# Patient Record
Sex: MALE | Race: WHITE | Employment: OTHER | URBAN - METROPOLITAN AREA
[De-identification: names, ages, dates, MRNs, and addresses within clinical notes are randomized per-mention and may not be internally consistent; named-entity substitution may affect disease eponyms.]

---

## 2017-01-13 ENCOUNTER — TELEPHONE (OUTPATIENT)
Dept: PRIMARY CARE CLINIC | Age: 67
End: 2017-01-13

## 2017-01-13 RX ORDER — BENZONATATE 200 MG/1
200 CAPSULE ORAL 3 TIMES DAILY PRN
Qty: 30 CAPSULE | Refills: 1 | Status: SHIPPED | OUTPATIENT
Start: 2017-01-13 | End: 2017-09-14

## 2017-01-14 DIAGNOSIS — M25.562 LEFT KNEE PAIN, UNSPECIFIED CHRONICITY: ICD-10-CM

## 2017-01-15 RX ORDER — IBUPROFEN 800 MG/1
TABLET ORAL
Qty: 180 TABLET | Refills: 1 | Status: SHIPPED | OUTPATIENT
Start: 2017-01-15 | End: 2017-11-07 | Stop reason: SDUPTHER

## 2017-01-17 ENCOUNTER — OFFICE VISIT (OUTPATIENT)
Dept: PRIMARY CARE CLINIC | Age: 67
End: 2017-01-17

## 2017-01-17 VITALS
TEMPERATURE: 97.9 F | RESPIRATION RATE: 18 BRPM | BODY MASS INDEX: 42.66 KG/M2 | HEART RATE: 74 BPM | DIASTOLIC BLOOD PRESSURE: 86 MMHG | SYSTOLIC BLOOD PRESSURE: 152 MMHG | OXYGEN SATURATION: 93 % | HEIGHT: 72 IN | WEIGHT: 315 LBS

## 2017-01-17 DIAGNOSIS — I10 ESSENTIAL HYPERTENSION: ICD-10-CM

## 2017-01-17 DIAGNOSIS — J20.9 ACUTE BRONCHITIS, UNSPECIFIED ORGANISM: Primary | ICD-10-CM

## 2017-01-17 PROCEDURE — 4040F PNEUMOC VAC/ADMIN/RCVD: CPT | Performed by: FAMILY MEDICINE

## 2017-01-17 PROCEDURE — G8427 DOCREV CUR MEDS BY ELIG CLIN: HCPCS | Performed by: FAMILY MEDICINE

## 2017-01-17 PROCEDURE — 4004F PT TOBACCO SCREEN RCVD TLK: CPT | Performed by: FAMILY MEDICINE

## 2017-01-17 PROCEDURE — G8484 FLU IMMUNIZE NO ADMIN: HCPCS | Performed by: FAMILY MEDICINE

## 2017-01-17 PROCEDURE — 3017F COLORECTAL CA SCREEN DOC REV: CPT | Performed by: FAMILY MEDICINE

## 2017-01-17 PROCEDURE — 96372 THER/PROPH/DIAG INJ SC/IM: CPT | Performed by: FAMILY MEDICINE

## 2017-01-17 PROCEDURE — 1123F ACP DISCUSS/DSCN MKR DOCD: CPT | Performed by: FAMILY MEDICINE

## 2017-01-17 PROCEDURE — G8419 CALC BMI OUT NRM PARAM NOF/U: HCPCS | Performed by: FAMILY MEDICINE

## 2017-01-17 PROCEDURE — 99213 OFFICE O/P EST LOW 20 MIN: CPT | Performed by: FAMILY MEDICINE

## 2017-01-17 RX ORDER — PROMETHAZINE HYDROCHLORIDE AND CODEINE PHOSPHATE 6.25; 1 MG/5ML; MG/5ML
5 SYRUP ORAL EVERY 4 HOURS PRN
Qty: 240 ML | Refills: 0 | Status: SHIPPED | OUTPATIENT
Start: 2017-01-17 | End: 2017-01-24

## 2017-01-17 RX ORDER — TRAMADOL HYDROCHLORIDE 50 MG/1
TABLET ORAL
Refills: 0 | COMMUNITY
Start: 2017-01-05 | End: 2018-05-21

## 2017-01-17 RX ORDER — AZITHROMYCIN 250 MG/1
TABLET, FILM COATED ORAL
Qty: 1 PACKET | Refills: 0 | Status: SHIPPED | OUTPATIENT
Start: 2017-01-17 | End: 2017-01-27

## 2017-01-17 RX ORDER — CEFTRIAXONE 1 G/1
1 INJECTION, POWDER, FOR SOLUTION INTRAMUSCULAR; INTRAVENOUS ONCE
Status: COMPLETED | OUTPATIENT
Start: 2017-01-17 | End: 2017-01-17

## 2017-01-17 RX ADMIN — CEFTRIAXONE 1 G: 1 INJECTION, POWDER, FOR SOLUTION INTRAMUSCULAR; INTRAVENOUS at 11:09

## 2017-01-17 ASSESSMENT — ENCOUNTER SYMPTOMS
SHORTNESS OF BREATH: 0
COUGH: 1

## 2017-02-04 ASSESSMENT — ENCOUNTER SYMPTOMS
HEMOPTYSIS: 0
APNEA: 0
EYE DISCHARGE: 0
ORTHOPNEA: 0
BLURRED VISION: 0
ABDOMINAL PAIN: 0
ABDOMINAL DISTENTION: 0
HEARTBURN: 0

## 2017-09-14 ENCOUNTER — OFFICE VISIT (OUTPATIENT)
Dept: PRIMARY CARE CLINIC | Age: 67
End: 2017-09-14

## 2017-09-14 VITALS
OXYGEN SATURATION: 96 % | WEIGHT: 315 LBS | SYSTOLIC BLOOD PRESSURE: 138 MMHG | TEMPERATURE: 97.8 F | RESPIRATION RATE: 16 BRPM | HEIGHT: 72 IN | HEART RATE: 58 BPM | BODY MASS INDEX: 42.66 KG/M2 | DIASTOLIC BLOOD PRESSURE: 80 MMHG

## 2017-09-14 DIAGNOSIS — G89.29 CHRONIC PAIN OF RIGHT KNEE: Primary | ICD-10-CM

## 2017-09-14 DIAGNOSIS — I15.9 SECONDARY HYPERTENSION: ICD-10-CM

## 2017-09-14 DIAGNOSIS — M25.561 CHRONIC PAIN OF RIGHT KNEE: Primary | ICD-10-CM

## 2017-09-14 DIAGNOSIS — Z01.818 PRE-OP EXAM: ICD-10-CM

## 2017-09-14 PROCEDURE — 99213 OFFICE O/P EST LOW 20 MIN: CPT | Performed by: INTERNAL MEDICINE

## 2017-09-14 ASSESSMENT — ENCOUNTER SYMPTOMS
VOMITING: 0
NAUSEA: 0
SHORTNESS OF BREATH: 0
VOICE CHANGE: 0
PHOTOPHOBIA: 0
TROUBLE SWALLOWING: 0
CHOKING: 0

## 2017-09-14 ASSESSMENT — PATIENT HEALTH QUESTIONNAIRE - PHQ9
1. LITTLE INTEREST OR PLEASURE IN DOING THINGS: 0
2. FEELING DOWN, DEPRESSED OR HOPELESS: 0
SUM OF ALL RESPONSES TO PHQ QUESTIONS 1-9: 0
SUM OF ALL RESPONSES TO PHQ9 QUESTIONS 1 & 2: 0

## 2017-11-07 DIAGNOSIS — I15.9 SECONDARY HYPERTENSION: ICD-10-CM

## 2017-11-07 DIAGNOSIS — M25.562 LEFT KNEE PAIN, UNSPECIFIED CHRONICITY: ICD-10-CM

## 2017-11-07 DIAGNOSIS — K21.9 GASTROESOPHAGEAL REFLUX DISEASE WITHOUT ESOPHAGITIS: ICD-10-CM

## 2017-11-07 DIAGNOSIS — I10 ESSENTIAL HYPERTENSION: ICD-10-CM

## 2017-11-07 RX ORDER — LISINOPRIL AND HYDROCHLOROTHIAZIDE 20; 12.5 MG/1; MG/1
TABLET ORAL
Qty: 90 TABLET | Refills: 2 | Status: SHIPPED | OUTPATIENT
Start: 2017-11-07 | End: 2018-05-21 | Stop reason: SDUPTHER

## 2017-11-07 RX ORDER — LISINOPRIL 40 MG/1
40 TABLET ORAL DAILY
Qty: 90 TABLET | Refills: 2 | Status: SHIPPED | OUTPATIENT
Start: 2017-11-07 | End: 2018-05-21 | Stop reason: SDUPTHER

## 2017-11-07 RX ORDER — ATENOLOL 50 MG/1
50 TABLET ORAL 2 TIMES DAILY
Qty: 180 TABLET | Refills: 2 | Status: SHIPPED | OUTPATIENT
Start: 2017-11-07 | End: 2018-05-21 | Stop reason: SDUPTHER

## 2017-11-07 RX ORDER — IBUPROFEN 800 MG/1
TABLET ORAL
Qty: 180 TABLET | Refills: 1 | Status: SHIPPED | OUTPATIENT
Start: 2017-11-07 | End: 2018-05-21 | Stop reason: SDUPTHER

## 2017-11-07 RX ORDER — OMEPRAZOLE 20 MG/1
20 CAPSULE, DELAYED RELEASE ORAL DAILY
Qty: 90 CAPSULE | Refills: 1 | Status: SHIPPED | OUTPATIENT
Start: 2017-11-07 | End: 2018-05-21 | Stop reason: SDUPTHER

## 2018-05-21 ENCOUNTER — OFFICE VISIT (OUTPATIENT)
Dept: PRIMARY CARE CLINIC | Age: 68
End: 2018-05-21
Payer: COMMERCIAL

## 2018-05-21 VITALS
DIASTOLIC BLOOD PRESSURE: 88 MMHG | OXYGEN SATURATION: 95 % | SYSTOLIC BLOOD PRESSURE: 156 MMHG | TEMPERATURE: 97.6 F | HEIGHT: 72 IN | HEART RATE: 59 BPM | RESPIRATION RATE: 16 BRPM | BODY MASS INDEX: 42.66 KG/M2 | WEIGHT: 315 LBS

## 2018-05-21 DIAGNOSIS — L30.9 DERMATITIS: ICD-10-CM

## 2018-05-21 DIAGNOSIS — Z00.00 PREVENTATIVE HEALTH CARE: ICD-10-CM

## 2018-05-21 DIAGNOSIS — Z11.4 ENCOUNTER FOR SCREENING FOR HIV: ICD-10-CM

## 2018-05-21 DIAGNOSIS — L30.8 OTHER ECZEMA: ICD-10-CM

## 2018-05-21 DIAGNOSIS — E03.9 HYPOTHYROIDISM, UNSPECIFIED TYPE: ICD-10-CM

## 2018-05-21 DIAGNOSIS — Z12.5 PROSTATE CANCER SCREENING: ICD-10-CM

## 2018-05-21 DIAGNOSIS — I10 ESSENTIAL HYPERTENSION: ICD-10-CM

## 2018-05-21 DIAGNOSIS — M25.562 LEFT KNEE PAIN, UNSPECIFIED CHRONICITY: Primary | ICD-10-CM

## 2018-05-21 DIAGNOSIS — E78.00 PURE HYPERCHOLESTEROLEMIA: ICD-10-CM

## 2018-05-21 DIAGNOSIS — K21.9 GASTROESOPHAGEAL REFLUX DISEASE WITHOUT ESOPHAGITIS: ICD-10-CM

## 2018-05-21 PROCEDURE — 99213 OFFICE O/P EST LOW 20 MIN: CPT | Performed by: INTERNAL MEDICINE

## 2018-05-21 RX ORDER — ATENOLOL 50 MG/1
50 TABLET ORAL 2 TIMES DAILY
Qty: 180 TABLET | Refills: 3 | Status: SHIPPED | OUTPATIENT
Start: 2018-05-21 | End: 2019-06-24 | Stop reason: SDUPTHER

## 2018-05-21 RX ORDER — LISINOPRIL 40 MG/1
40 TABLET ORAL DAILY
Qty: 90 TABLET | Refills: 3 | Status: SHIPPED | OUTPATIENT
Start: 2018-05-21 | End: 2019-12-30 | Stop reason: SDUPTHER

## 2018-05-21 RX ORDER — LISINOPRIL AND HYDROCHLOROTHIAZIDE 20; 12.5 MG/1; MG/1
TABLET ORAL
Qty: 90 TABLET | Refills: 3 | Status: SHIPPED | OUTPATIENT
Start: 2018-05-21 | End: 2019-07-23 | Stop reason: SDUPTHER

## 2018-05-21 RX ORDER — OMEPRAZOLE 20 MG/1
20 CAPSULE, DELAYED RELEASE ORAL DAILY
Qty: 90 CAPSULE | Refills: 3 | Status: SHIPPED | OUTPATIENT
Start: 2018-05-21 | End: 2019-04-15 | Stop reason: SDUPTHER

## 2018-05-21 RX ORDER — IBUPROFEN 800 MG/1
TABLET ORAL
Qty: 180 TABLET | Refills: 2 | Status: SHIPPED | OUTPATIENT
Start: 2018-05-21 | End: 2019-12-03 | Stop reason: SDUPTHER

## 2018-05-24 ASSESSMENT — ENCOUNTER SYMPTOMS
VOMITING: 0
SHORTNESS OF BREATH: 0
CHOKING: 0
PHOTOPHOBIA: 0
HEARTBURN: 1
TROUBLE SWALLOWING: 0
VOICE CHANGE: 0
NAUSEA: 0

## 2018-05-30 DIAGNOSIS — Z12.5 PROSTATE CANCER SCREENING: ICD-10-CM

## 2018-05-30 DIAGNOSIS — I10 ESSENTIAL HYPERTENSION: ICD-10-CM

## 2018-05-30 DIAGNOSIS — E03.9 HYPOTHYROIDISM, UNSPECIFIED TYPE: ICD-10-CM

## 2018-05-30 DIAGNOSIS — Z00.00 PREVENTATIVE HEALTH CARE: ICD-10-CM

## 2018-05-30 LAB
ALBUMIN SERPL-MCNC: 4.4 G/DL (ref 3.9–4.9)
ALP BLD-CCNC: 49 U/L (ref 35–104)
ALT SERPL-CCNC: 13 U/L (ref 0–41)
ANION GAP SERPL CALCULATED.3IONS-SCNC: 16 MEQ/L (ref 7–13)
AST SERPL-CCNC: 15 U/L (ref 0–40)
BASOPHILS ABSOLUTE: 0.1 K/UL (ref 0–0.2)
BASOPHILS RELATIVE PERCENT: 0.8 %
BILIRUB SERPL-MCNC: 0.6 MG/DL (ref 0–1.2)
BUN BLDV-MCNC: 18 MG/DL (ref 8–23)
CALCIUM SERPL-MCNC: 9.1 MG/DL (ref 8.6–10.2)
CHLORIDE BLD-SCNC: 99 MEQ/L (ref 98–107)
CHOLESTEROL, TOTAL: 208 MG/DL (ref 0–199)
CO2: 23 MEQ/L (ref 22–29)
CREAT SERPL-MCNC: 1.04 MG/DL (ref 0.7–1.2)
EOSINOPHILS ABSOLUTE: 0.4 K/UL (ref 0–0.7)
EOSINOPHILS RELATIVE PERCENT: 4.8 %
GFR AFRICAN AMERICAN: >60
GFR NON-AFRICAN AMERICAN: >60
GLOBULIN: 2.4 G/DL (ref 2.3–3.5)
GLUCOSE BLD-MCNC: 102 MG/DL (ref 74–109)
HCT VFR BLD CALC: 47.1 % (ref 42–52)
HDLC SERPL-MCNC: 36 MG/DL (ref 40–59)
HEMOGLOBIN: 15.2 G/DL (ref 14–18)
LDL CHOLESTEROL CALCULATED: 124 MG/DL (ref 0–129)
LYMPHOCYTES ABSOLUTE: 1.4 K/UL (ref 1–4.8)
LYMPHOCYTES RELATIVE PERCENT: 17.6 %
MCH RBC QN AUTO: 28.1 PG (ref 27–31.3)
MCHC RBC AUTO-ENTMCNC: 32.3 % (ref 33–37)
MCV RBC AUTO: 87 FL (ref 80–100)
MONOCYTES ABSOLUTE: 0.8 K/UL (ref 0.2–0.8)
MONOCYTES RELATIVE PERCENT: 9.3 %
NEUTROPHILS ABSOLUTE: 5.5 K/UL (ref 1.4–6.5)
NEUTROPHILS RELATIVE PERCENT: 67.5 %
PDW BLD-RTO: 13.9 % (ref 11.5–14.5)
PLATELET # BLD: 213 K/UL (ref 130–400)
POTASSIUM SERPL-SCNC: 4.1 MEQ/L (ref 3.5–5.1)
PROSTATE SPECIFIC ANTIGEN: 0.68 NG/ML (ref 0–5.4)
RBC # BLD: 5.41 M/UL (ref 4.7–6.1)
SODIUM BLD-SCNC: 138 MEQ/L (ref 132–144)
TOTAL PROTEIN: 6.8 G/DL (ref 6.4–8.1)
TRIGL SERPL-MCNC: 239 MG/DL (ref 0–200)
TSH SERPL DL<=0.05 MIU/L-ACNC: 2.42 UIU/ML (ref 0.27–4.2)
WBC # BLD: 8.1 K/UL (ref 4.8–10.8)

## 2018-06-02 LAB — HIV-1 AND HIV-2 ANTIBODIES: NEGATIVE

## 2018-06-25 ENCOUNTER — TELEPHONE (OUTPATIENT)
Dept: PRIMARY CARE CLINIC | Age: 68
End: 2018-06-25

## 2018-09-20 DIAGNOSIS — I10 ESSENTIAL HYPERTENSION: ICD-10-CM

## 2018-09-20 RX ORDER — ATENOLOL 50 MG/1
50 TABLET ORAL 2 TIMES DAILY
Qty: 180 TABLET | Refills: 3 | OUTPATIENT
Start: 2018-09-20

## 2019-04-15 DIAGNOSIS — I10 ESSENTIAL HYPERTENSION: ICD-10-CM

## 2019-04-15 DIAGNOSIS — K21.9 GASTROESOPHAGEAL REFLUX DISEASE WITHOUT ESOPHAGITIS: ICD-10-CM

## 2019-04-15 RX ORDER — OMEPRAZOLE 20 MG/1
20 CAPSULE, DELAYED RELEASE ORAL DAILY
Qty: 90 CAPSULE | Refills: 3 | Status: SHIPPED | OUTPATIENT
Start: 2019-04-15 | End: 2020-02-06 | Stop reason: SDUPTHER

## 2019-06-24 DIAGNOSIS — I10 ESSENTIAL HYPERTENSION: ICD-10-CM

## 2019-06-24 RX ORDER — ATENOLOL 50 MG/1
TABLET ORAL
Qty: 180 TABLET | Refills: 3 | Status: SHIPPED | OUTPATIENT
Start: 2019-06-24 | End: 2020-02-06 | Stop reason: SDUPTHER

## 2019-07-23 DIAGNOSIS — I10 ESSENTIAL HYPERTENSION: ICD-10-CM

## 2019-07-23 RX ORDER — LISINOPRIL AND HYDROCHLOROTHIAZIDE 20; 12.5 MG/1; MG/1
TABLET ORAL
Qty: 90 TABLET | Refills: 3 | Status: SHIPPED | OUTPATIENT
Start: 2019-07-23 | End: 2020-02-06 | Stop reason: SDUPTHER

## 2019-12-03 ENCOUNTER — TELEPHONE (OUTPATIENT)
Dept: FAMILY MEDICINE CLINIC | Age: 69
End: 2019-12-03

## 2019-12-03 DIAGNOSIS — M25.562 LEFT KNEE PAIN, UNSPECIFIED CHRONICITY: ICD-10-CM

## 2019-12-03 RX ORDER — IBUPROFEN 800 MG/1
TABLET ORAL
Qty: 180 TABLET | Refills: 1 | Status: SHIPPED | OUTPATIENT
Start: 2019-12-03 | End: 2020-02-06 | Stop reason: SDUPTHER

## 2019-12-30 RX ORDER — LISINOPRIL 40 MG/1
40 TABLET ORAL DAILY
Qty: 90 TABLET | Refills: 0 | Status: SHIPPED | OUTPATIENT
Start: 2019-12-30 | End: 2020-02-06 | Stop reason: SDUPTHER

## 2020-02-06 RX ORDER — OMEPRAZOLE 20 MG/1
20 CAPSULE, DELAYED RELEASE ORAL DAILY
Qty: 90 CAPSULE | Refills: 3 | Status: SHIPPED | OUTPATIENT
Start: 2020-02-06 | End: 2020-12-07 | Stop reason: SDUPTHER

## 2020-02-06 RX ORDER — LISINOPRIL 40 MG/1
40 TABLET ORAL DAILY
Qty: 90 TABLET | Refills: 0 | Status: SHIPPED | OUTPATIENT
Start: 2020-02-06 | End: 2020-06-25

## 2020-02-06 RX ORDER — LISINOPRIL AND HYDROCHLOROTHIAZIDE 20; 12.5 MG/1; MG/1
TABLET ORAL
Qty: 90 TABLET | Refills: 3 | Status: SHIPPED | OUTPATIENT
Start: 2020-02-06 | End: 2020-06-25 | Stop reason: SDUPTHER

## 2020-02-06 RX ORDER — IBUPROFEN 800 MG/1
TABLET ORAL
Qty: 180 TABLET | Refills: 1 | Status: SHIPPED | OUTPATIENT
Start: 2020-02-06 | End: 2020-12-07 | Stop reason: SDUPTHER

## 2020-02-06 RX ORDER — ATENOLOL 50 MG/1
TABLET ORAL
Qty: 180 TABLET | Refills: 3 | Status: SHIPPED | OUTPATIENT
Start: 2020-02-06 | End: 2020-12-07 | Stop reason: SDUPTHER

## 2020-03-30 ENCOUNTER — TELEPHONE (OUTPATIENT)
Dept: PRIMARY CARE CLINIC | Age: 70
End: 2020-03-30

## 2020-05-14 ENCOUNTER — OFFICE VISIT (OUTPATIENT)
Dept: PRIMARY CARE CLINIC | Age: 70
End: 2020-05-14
Payer: MEDICARE

## 2020-05-14 VITALS
HEART RATE: 83 BPM | OXYGEN SATURATION: 97 % | WEIGHT: 315 LBS | TEMPERATURE: 97.6 F | HEIGHT: 72 IN | RESPIRATION RATE: 14 BRPM | DIASTOLIC BLOOD PRESSURE: 110 MMHG | BODY MASS INDEX: 42.66 KG/M2 | SYSTOLIC BLOOD PRESSURE: 168 MMHG

## 2020-05-14 PROCEDURE — 99213 OFFICE O/P EST LOW 20 MIN: CPT | Performed by: INTERNAL MEDICINE

## 2020-05-14 RX ORDER — ZOLPIDEM TARTRATE 10 MG/1
10 TABLET ORAL NIGHTLY PRN
Qty: 30 TABLET | Refills: 2 | Status: SHIPPED | OUTPATIENT
Start: 2020-05-14 | End: 2020-08-12

## 2020-05-14 RX ORDER — AMLODIPINE BESYLATE 5 MG/1
5 TABLET ORAL DAILY
Qty: 90 TABLET | Refills: 3 | Status: SHIPPED | OUTPATIENT
Start: 2020-05-14 | End: 2020-12-07 | Stop reason: SDUPTHER

## 2020-05-14 SDOH — ECONOMIC STABILITY: FOOD INSECURITY: WITHIN THE PAST 12 MONTHS, THE FOOD YOU BOUGHT JUST DIDN'T LAST AND YOU DIDN'T HAVE MONEY TO GET MORE.: NEVER TRUE

## 2020-05-14 SDOH — ECONOMIC STABILITY: FOOD INSECURITY: WITHIN THE PAST 12 MONTHS, YOU WORRIED THAT YOUR FOOD WOULD RUN OUT BEFORE YOU GOT MONEY TO BUY MORE.: NEVER TRUE

## 2020-05-14 SDOH — ECONOMIC STABILITY: INCOME INSECURITY: HOW HARD IS IT FOR YOU TO PAY FOR THE VERY BASICS LIKE FOOD, HOUSING, MEDICAL CARE, AND HEATING?: NOT HARD AT ALL

## 2020-05-14 ASSESSMENT — ENCOUNTER SYMPTOMS
SHORTNESS OF BREATH: 0
VOMITING: 0
CHOKING: 0
BACK PAIN: 1
VOICE CHANGE: 0
HEARTBURN: 1
PHOTOPHOBIA: 0
NAUSEA: 0
TROUBLE SWALLOWING: 0

## 2020-05-14 ASSESSMENT — PATIENT HEALTH QUESTIONNAIRE - PHQ9
SUM OF ALL RESPONSES TO PHQ9 QUESTIONS 1 & 2: 0
1. LITTLE INTEREST OR PLEASURE IN DOING THINGS: 0
2. FEELING DOWN, DEPRESSED OR HOPELESS: 0
SUM OF ALL RESPONSES TO PHQ QUESTIONS 1-9: 0
SUM OF ALL RESPONSES TO PHQ QUESTIONS 1-9: 0

## 2020-05-14 NOTE — PROGRESS NOTES
Worry: Never true     Inability: Never true    Transportation needs     Medical: None     Non-medical: None   Tobacco Use    Smoking status: Current Some Day Smoker     Types: Cigarettes    Smokeless tobacco: Never Used    Tobacco comment: cigars, weekly   Substance and Sexual Activity    Alcohol use: Yes     Alcohol/week: 0.0 standard drinks     Comment: rare    Drug use: No    Sexual activity: Not Currently   Lifestyle    Physical activity     Days per week: None     Minutes per session: None    Stress: None   Relationships    Social connections     Talks on phone: None     Gets together: None     Attends Jew service: None     Active member of club or organization: None     Attends meetings of clubs or organizations: None     Relationship status: None    Intimate partner violence     Fear of current or ex partner: None     Emotionally abused: None     Physically abused: None     Forced sexual activity: None   Other Topics Concern    None   Social History Narrative    None     No Known Allergies    Review of Systems   Constitutional: Negative for fatigue and fever. HENT: Negative for trouble swallowing and voice change. Eyes: Negative for photophobia and visual disturbance. Respiratory: Negative for choking and shortness of breath. Cardiovascular: Negative for chest pain and palpitations. Gastrointestinal: Positive for heartburn. Negative for nausea and vomiting. Genitourinary: Negative for decreased urine volume, testicular pain and urgency. Musculoskeletal: Positive for arthralgias and back pain. Skin: Negative for rash. Neurological: Negative for tremors, syncope and headaches. Hematological: Does not bruise/bleed easily. Psychiatric/Behavioral: Positive for agitation and sleep disturbance. Negative for suicidal ideas. The patient has insomnia.             Vitals:    05/14/20 1426   BP: (!) 168/110   Pulse: 83   Resp: 14   Temp: 97.6 °F (36.4 °C)   SpO2: 97% Weight: (!) 338 lb (153.3 kg)   Height: 5' 11.75\" (1.822 m)       Physical Exam  Constitutional:       Appearance: He is well-developed. HENT:      Head: Normocephalic and atraumatic. Mouth/Throat:      Mouth: Mucous membranes are moist.   Eyes:      Conjunctiva/sclera: Conjunctivae normal.      Pupils: Pupils are equal, round, and reactive to light. Neck:      Musculoskeletal: Normal range of motion. Cardiovascular:      Rate and Rhythm: Normal rate and regular rhythm. Pulmonary:      Effort: Pulmonary effort is normal. No respiratory distress. Breath sounds: No wheezing or rales. Abdominal:      General: Bowel sounds are normal. There is no distension. Palpations: Abdomen is soft. Musculoskeletal: Normal range of motion. Skin:     General: Skin is dry. Neurological:      Mental Status: He is alert. Psychiatric:         Mood and Affect: Mood normal.     Assessment/Plan  Anastasia Navas was seen today for insomnia and hypertension. Diagnoses and all orders for this visit:    Insomnia, unspecified type  -     zolpidem (AMBIEN) 10 MG tablet; Take 1 tablet by mouth nightly as needed for Sleep for up to 90 days. Preventative health care  -     Comprehensive Metabolic Panel; Future  -     CBC With Auto Differential; Future  -     PSA Screening; Future  -     Lipid Panel; Future  -     Hemoglobin A1C; Future    Prostate cancer screening  -     PSA Screening; Future    Hyperglycemia  -     Comprehensive Metabolic Panel; Future  -     Hemoglobin A1C; Future    Hyperlipidemia, unspecified hyperlipidemia type  -     Comprehensive Metabolic Panel; Future  -     Lipid Panel; Future    Other orders  -     amLODIPine (NORVASC) 5 MG tablet; Take 1 tablet by mouth daily        Return in about 1 year (around 5/14/2021), or if symptoms worsen or fail to improve.     Maia Porter MD

## 2020-06-19 ENCOUNTER — VIRTUAL VISIT (OUTPATIENT)
Dept: PRIMARY CARE CLINIC | Age: 70
End: 2020-06-19
Payer: MEDICARE

## 2020-06-19 VITALS — BODY MASS INDEX: 41.99 KG/M2 | HEIGHT: 72 IN | WEIGHT: 310 LBS

## 2020-06-19 PROCEDURE — G0438 PPPS, INITIAL VISIT: HCPCS | Performed by: NURSE PRACTITIONER

## 2020-06-19 ASSESSMENT — LIFESTYLE VARIABLES
HOW OFTEN DURING THE LAST YEAR HAVE YOU NEEDED AN ALCOHOLIC DRINK FIRST THING IN THE MORNING TO GET YOURSELF GOING AFTER A NIGHT OF HEAVY DRINKING: 0
AUDIT TOTAL SCORE: 1
HOW OFTEN DURING THE LAST YEAR HAVE YOU HAD A FEELING OF GUILT OR REMORSE AFTER DRINKING: 0
HOW MANY STANDARD DRINKS CONTAINING ALCOHOL DO YOU HAVE ON A TYPICAL DAY: 0
HOW OFTEN DO YOU HAVE A DRINK CONTAINING ALCOHOL: 1
HOW OFTEN DURING THE LAST YEAR HAVE YOU FOUND THAT YOU WERE NOT ABLE TO STOP DRINKING ONCE YOU HAD STARTED: 0
HAVE YOU OR SOMEONE ELSE BEEN INJURED AS A RESULT OF YOUR DRINKING: 0
HOW OFTEN DO YOU HAVE SIX OR MORE DRINKS ON ONE OCCASION: 0
HAS A RELATIVE, FRIEND, DOCTOR, OR ANOTHER HEALTH PROFESSIONAL EXPRESSED CONCERN ABOUT YOUR DRINKING OR SUGGESTED YOU CUT DOWN: 0
HOW OFTEN DURING THE LAST YEAR HAVE YOU BEEN UNABLE TO REMEMBER WHAT HAPPENED THE NIGHT BEFORE BECAUSE YOU HAD BEEN DRINKING: 0
AUDIT-C TOTAL SCORE: 1
HOW OFTEN DURING THE LAST YEAR HAVE YOU FAILED TO DO WHAT WAS NORMALLY EXPECTED FROM YOU BECAUSE OF DRINKING: 0

## 2020-06-19 ASSESSMENT — PATIENT HEALTH QUESTIONNAIRE - PHQ9
SUM OF ALL RESPONSES TO PHQ QUESTIONS 1-9: 0
SUM OF ALL RESPONSES TO PHQ QUESTIONS 1-9: 0

## 2020-06-19 NOTE — PROGRESS NOTES
Medicare Annual Wellness Visit  Are Name: Donal Dean Date: 2020   MRN: [id number] Sex: Male   Age: 78 y.o. Ethnicity: Non-/Non    : 1950 Race: Izaiah Richey is here for Medicare AWV    Screenings for behavioral, psychosocial and functional/safety risks, and cognitive dysfunction are all negative except as indicated below. These results, as well as other patient data from the [address] form, are documented in Flowsheets linked to this Encounter. No Known Allergies      Prior to Visit Medications    Medication Sig Taking? Authorizing Provider   zolpidem (AMBIEN) 10 MG tablet Take 1 tablet by mouth nightly as needed for Sleep for up to 90 days.   Scarlette Leyden, MD   amLODIPine (NORVASC) 5 MG tablet Take 1 tablet by mouth daily  Scarlette Leyden, MD   lisinopril (PRINIVIL;ZESTRIL) 40 MG tablet Take 1 tablet by mouth daily  Scarlette Leyden, MD   atenolol (TENORMIN) 50 MG tablet TAKE 1 TABLET TWICE A DAY  Scarlette Leyden, MD   ibuprofen (ADVIL;MOTRIN) 800 MG tablet TAKE 1 TABLET TWICE A DAY  AS NEEDED FOR PAIN  Scarlette Leyden, MD   omeprazole (PRILOSEC) 20 MG delayed release capsule Take 1 capsule by mouth Daily  Scarlette Leyden, MD   lisinopril-hydrochlorothiazide (PRINZIDE;ZESTORETIC) 20-12.5 MG per tablet 1 pill qd po  Scarlette Leyden, MD         Past Medical History:   Diagnosis Date    GERD (gastroesophageal reflux disease)     Hyperlipidemia     Hypertension        Past Surgical History:   Procedure Laterality Date    CARPAL TUNNEL RELEASE      COLONOSCOPY      VASECTOMY           Family History   Problem Relation Age of Onset    Cancer Mother         uterine, breast, skin    Stroke Brother        CareTeam (Including outside providers/suppliers regularly involved in providing care):   Patient Care Team:  Scarlette Leyden, MD as PCP - General (Internal Medicine)  Scarlette Leyden, MD as PCP - [facility]argas Provider    Wt Readings from Last 3 Encounters:   20 (!)

## 2020-06-19 NOTE — PATIENT INSTRUCTIONS
diet is one that limits sodium , certain types of fat , and cholesterol . This type of diet is recommended for:   People with any form of cardiovascular disease (eg, coronary heart disease , peripheral vascular disease , previous heart attack , previous stroke )   People with risk factors for cardiovascular disease, such as high blood pressure , high cholesterol , or diabetes   Anyone who wants to lower their risk of developing cardiovascular disease   Sodium    Sodium is a mineral found in many foods. In general, most people consume much more sodium than they need. Diets high in sodium can increase blood pressure and lead to edema (water retention). On a heart-healthy diet, you should consume no more than 2,300 mg (milligrams) of sodium per dayabout the amount in one teaspoon of table salt. The foods highest in sodium include table salt (about 50% sodium), processed foods, convenience foods, and preserved foods. Cholesterol    Cholesterol is a fat-like, waxy substance in your blood. Our bodies make some cholesterol. It is also found in animal products, with the highest amounts in fatty meat, egg yolks, whole milk, cheese, shellfish, and organ meats. On a heart-healthy diet, you should limit your cholesterol intake to less than 200 mg per day. It is normal and important to have some cholesterol in your bloodstream. But too much cholesterol can cause plaque to build up within your arteries, which can eventually lead to a heart attack or stroke. The two types of cholesterol that are most commonly referred to are:   Low-density lipoprotein (LDL) cholesterol  Also known as bad cholesterol, this is the cholesterol that tends to build up along your arteries. Bad cholesterol levels are increased by eating fats that are saturated or hydrogenated. Optimal level of this cholesterol is less than 100. Over 130 starts to get risky for heart disease.    High-density lipoprotein (HDL) cholesterol  Also known as good cholesterol, this type of cholesterol actually carries cholesterol away from your arteries and may, therefore, help lower your risk of having a heart attack. You want this level to be high (ideally greater than 60). It is a risk to have a level less than 40. You can raise this good cholesterol by eating olive oil, canola oil, avocados, or nuts. Exercise raises this level, too. Fat    Fat is calorie dense and packs a lot of calories into a small amount of food. Even though fats should be limited due to their high calorie content, not all fats are bad. In fact, some fats are quite healthful. Fat can be broken down into four main types. The good-for-you fats are:   Monounsaturated fat  found in oils such as olive and canola, avocados, and nuts and natural nut butters; can decrease cholesterol levels, while keeping levels of HDL cholesterol high   Polyunsaturated fat  found in oils such as safflower, sunflower, soybean, corn, and sesame; can decrease total cholesterol and LDL cholesterol   Omega-3 fatty acids  particularly those found in fatty fish (such as salmon, trout, tuna, mackerel, herring, and sardines); can decrease risk of arrhythmias, decrease triglyceride levels, and slightly lower blood pressure   The fats that you want to limit are:   Saturated fat  found in animal products, many fast foods, and a few vegetables; increases total blood cholesterol, including LDL levels   Animal fats that are saturated include: butter, lard, whole-milk dairy products, meat fat, and poultry skin   Vegetable fats that are saturated include: hydrogenated shortening, palm oil, coconut oil, cocoa butter   Hydrogenated or trans fat  found in margarine and vegetable shortening, most shelf stable snack foods, and fried foods; increases LDL and decreases HDL     It is generally recommended that you limit your total fat for the day to less than 30% of your total calories.  If you follow an 1800-calorie heart healthy diet, for example, this would mean 60 grams of fat or less per day. Saturated fat and trans fat in your diet raises your blood cholesterol the most, much more than dietary cholesterol does. For this reason, on a heart-healthy diet, less than 7% of your calories should come from saturated fat and ideally 0% from trans fat. On an 1800-calorie diet, this translates into less than 14 grams of saturated fat per day, leaving 46 grams of fat to come from mono- and polyunsaturated fats.    Food Choices on a Heart Healthy Diet   Food Category   Foods Recommended   Foods to Avoid   Grains   Breads and rolls without salted tops Most dry and cooked cereals Unsalted crackers and breadsticks Low-sodium or homemade breadcrumbs or stuffing All rice and pastas   Breads, rolls, and crackers with salted tops High-fat baked goods (eg, muffins, donuts, pastries) Quick breads, self-rising flour, and biscuit mixes Regular bread crumbs Instant hot cereals Commercially prepared rice, pasta, or stuffing mixes   Vegetables   Most fresh, frozen, and low-sodium canned vegetables Low-sodium and salt-free vegetable juices Canned vegetables if unsalted or rinsed   Regular canned vegetables and juices, including sauerkraut and pickled vegetables Frozen vegetables with sauces Commercially prepared potato and vegetable mixes   Fruits   Most fresh, frozen, and canned fruits All fruit juices   Fruits processed with salt or sodium   Milk   Nonfat or low-fat (1%) milk Nonfat or low-fat yogurt Cottage cheese, low-fat ricotta, cheeses labeled as low-fat and low-sodium   Whole milk Reduced-fat (2%) milk Malted and chocolate milk Full fat yogurt Most cheeses (unless low-fat and low salt) Buttermilk (no more than 1 cup per week)   Meats and Beans   Lean cuts of fresh or frozen beef, veal, lamb, or pork (look for the word loin) Fresh or frozen poultry without the skin Fresh or frozen fish and some shellfish Egg whites and egg substitutes (Limit whole eggs to three per week) Tofu Nuts or seeds (unsalted, dry-roasted), low-sodium peanut butter Dried peas, beans, and lentils   Any smoked, cured, salted, or canned meat, fish, or poultry (including mccallum, chipped beef, cold cuts, hot dogs, sausages, sardines, and anchovies) Poultry skins Breaded and/or fried fish or meats Canned peas, beans, and lentils Salted nuts   Fats and Oils   Olive oil and canola oil Low-sodium, low-fat salad dressings and mayonnaise   Butter, margarine, coconut and palm oils, mccallum fat   Snacks, Sweets, and Condiments   Low-sodium or unsalted versions of broths, soups, soy sauce, and condiments Pepper, herbs, and spices; vinegar, lemon, or lime juice Low-fat frozen desserts (yogurt, sherbet, fruit bars) Sugar, cocoa powder, honey, syrup, jam, and preserves Low-fat, trans-fat free cookies, cakes, and pies Thompson and animal crackers, fig bars, walker snaps   High-fat desserts Broth, soups, gravies, and sauces, made from instant mixes or other high-sodium ingredients Salted snack foods Canned olives Meat tenderizers, seasoning salt, and most flavored vinegars   Beverages   Low-sodium carbonated beverages Tea and coffee in moderation Soy milk   Commercially softened water   Suggestions   Make whole grains, fruits, and vegetables the base of your diet. Choose heart-healthy fats such as canola, olive, and flaxseed oil, and foods high in heart-healthy fats, such as nuts, seeds, soybeans, tofu, and fish. Eat fish at least twice per week; the fish highest in omega-3 fatty acids and lowest in mercury include salmon, herring, mackerel, sardines, and canned chunk light tuna. If you eat fish less than twice per week or have high triglycerides, talk to your doctor about taking fish oil supplements. Read food labels.    For products low in fat and cholesterol, look for fat free, low-fat, cholesterol free, saturated fat free, and trans fat freeAlso scan the Nutrition Facts Label, which lists saturated fat, trans fat, and cholesterol amounts. For products low in sodium, look for sodium free, very low sodium, low sodium, no added salt, and unsalted   Skip the salt when cooking or at the table; if food needs more flavor, get creative and try out different herbs and spices. Garlic and onion also add substantial flavor to foods. Trim any visible fat off meat and poultry before cooking, and drain the fat off after sierra. Use cooking methods that require little or no added fat, such as grilling, boiling, baking, poaching, broiling, roasting, steaming, stir-frying, and sauting. Avoid fast food and convenience food. They tend to be high in saturated and trans fat and have a lot of added salt. Talk to a registered dietitian for individualized diet advice. Last Reviewed: March 2011 Maria C Choe MS, MPH, RD   Updated: 3/29/2011   ·     Heart-Healthy Diet   Sodium, Fat, and Cholesterol Controlled Diet       What Is a Heart Healthy Diet? A heart-healthy diet is one that limits sodium , certain types of fat , and cholesterol . This type of diet is recommended for:   People with any form of cardiovascular disease (eg, coronary heart disease , peripheral vascular disease , previous heart attack , previous stroke )   People with risk factors for cardiovascular disease, such as high blood pressure , high cholesterol , or diabetes   Anyone who wants to lower their risk of developing cardiovascular disease   Sodium    Sodium is a mineral found in many foods. In general, most people consume much more sodium than they need. Diets high in sodium can increase blood pressure and lead to edema (water retention). On a heart-healthy diet, you should consume no more than 2,300 mg (milligrams) of sodium per dayabout the amount in one teaspoon of table salt. The foods highest in sodium include table salt (about 50% sodium), processed foods, convenience foods, and preserved foods.    Cholesterol    Cholesterol is a fat-like, waxy Vegetables   Most fresh, frozen, and low-sodium canned vegetables Low-sodium and salt-free vegetable juices Canned vegetables if unsalted or rinsed   Regular canned vegetables and juices, including sauerkraut and pickled vegetables Frozen vegetables with sauces Commercially prepared potato and vegetable mixes   Fruits   Most fresh, frozen, and canned fruits All fruit juices   Fruits processed with salt or sodium   Milk   Nonfat or low-fat (1%) milk Nonfat or low-fat yogurt Cottage cheese, low-fat ricotta, cheeses labeled as low-fat and low-sodium   Whole milk Reduced-fat (2%) milk Malted and chocolate milk Full fat yogurt Most cheeses (unless low-fat and low salt) Buttermilk (no more than 1 cup per week)   Meats and Beans   Lean cuts of fresh or frozen beef, veal, lamb, or pork (look for the word loin) Fresh or frozen poultry without the skin Fresh or frozen fish and some shellfish Egg whites and egg substitutes (Limit whole eggs to three per week) Tofu Nuts or seeds (unsalted, dry-roasted), low-sodium peanut butter Dried peas, beans, and lentils   Any smoked, cured, salted, or canned meat, fish, or poultry (including mccallum, chipped beef, cold cuts, hot dogs, sausages, sardines, and anchovies) Poultry skins Breaded and/or fried fish or meats Canned peas, beans, and lentils Salted nuts   Fats and Oils   Olive oil and canola oil Low-sodium, low-fat salad dressings and mayonnaise   Butter, margarine, coconut and palm oils, mccallum fat   Snacks, Sweets, and Condiments   Low-sodium or unsalted versions of broths, soups, soy sauce, and condiments Pepper, herbs, and spices; vinegar, lemon, or lime juice Low-fat frozen desserts (yogurt, sherbet, fruit bars) Sugar, cocoa powder, honey, syrup, jam, and preserves Low-fat, trans-fat free cookies, cakes, and pies Tohmpson and animal crackers, fig bars, walker snaps   High-fat desserts Broth, soups, gravies, and sauces, made from instant mixes or other high-sodium ingredients Salted snack foods Canned olives Meat tenderizers, seasoning salt, and most flavored vinegars   Beverages   Low-sodium carbonated beverages Tea and coffee in moderation Soy milk   Commercially softened water   Suggestions   Make whole grains, fruits, and vegetables the base of your diet. Choose heart-healthy fats such as canola, olive, and flaxseed oil, and foods high in heart-healthy fats, such as nuts, seeds, soybeans, tofu, and fish. Eat fish at least twice per week; the fish highest in omega-3 fatty acids and lowest in mercury include salmon, herring, mackerel, sardines, and canned chunk light tuna. If you eat fish less than twice per week or have high triglycerides, talk to your doctor about taking fish oil supplements. Read food labels. For products low in fat and cholesterol, look for fat free, low-fat, cholesterol free, saturated fat free, and trans fat freeAlso scan the Nutrition Facts Label, which lists saturated fat, trans fat, and cholesterol amounts. For products low in sodium, look for sodium free, very low sodium, low sodium, no added salt, and unsalted   Skip the salt when cooking or at the table; if food needs more flavor, get creative and try out different herbs and spices. Garlic and onion also add substantial flavor to foods. Trim any visible fat off meat and poultry before cooking, and drain the fat off after sierra. Use cooking methods that require little or no added fat, such as grilling, boiling, baking, poaching, broiling, roasting, steaming, stir-frying, and sauting. Avoid fast food and convenience food. They tend to be high in saturated and trans fat and have a lot of added salt. Talk to a registered dietitian for individualized diet advice. Last Reviewed: March 2011 Maria C Choe MS, MPH, RD   Updated: 3/29/2011   ·     Heart-Healthy Diet   Sodium, Fat, and Cholesterol Controlled Diet       What Is a Heart Healthy Diet?    A heart-healthy diet is one that limits sodium , certain types of fat , and cholesterol . This type of diet is recommended for:   People with any form of cardiovascular disease (eg, coronary heart disease , peripheral vascular disease , previous heart attack , previous stroke )   People with risk factors for cardiovascular disease, such as high blood pressure , high cholesterol , or diabetes   Anyone who wants to lower their risk of developing cardiovascular disease   Sodium    Sodium is a mineral found in many foods. In general, most people consume much more sodium than they need. Diets high in sodium can increase blood pressure and lead to edema (water retention). On a heart-healthy diet, you should consume no more than 2,300 mg (milligrams) of sodium per dayabout the amount in one teaspoon of table salt. The foods highest in sodium include table salt (about 50% sodium), processed foods, convenience foods, and preserved foods. Cholesterol    Cholesterol is a fat-like, waxy substance in your blood. Our bodies make some cholesterol. It is also found in animal products, with the highest amounts in fatty meat, egg yolks, whole milk, cheese, shellfish, and organ meats. On a heart-healthy diet, you should limit your cholesterol intake to less than 200 mg per day. It is normal and important to have some cholesterol in your bloodstream. But too much cholesterol can cause plaque to build up within your arteries, which can eventually lead to a heart attack or stroke. The two types of cholesterol that are most commonly referred to are:   Low-density lipoprotein (LDL) cholesterol  Also known as bad cholesterol, this is the cholesterol that tends to build up along your arteries. Bad cholesterol levels are increased by eating fats that are saturated or hydrogenated. Optimal level of this cholesterol is less than 100. Over 130 starts to get risky for heart disease.    High-density lipoprotein (HDL) cholesterol  Also known as good cholesterol, this type of cholesterol actually carries cholesterol away from your arteries and may, therefore, help lower your risk of having a heart attack. You want this level to be high (ideally greater than 60). It is a risk to have a level less than 40. You can raise this good cholesterol by eating olive oil, canola oil, avocados, or nuts. Exercise raises this level, too. Fat    Fat is calorie dense and packs a lot of calories into a small amount of food. Even though fats should be limited due to their high calorie content, not all fats are bad. In fact, some fats are quite healthful. Fat can be broken down into four main types. The good-for-you fats are:   Monounsaturated fat  found in oils such as olive and canola, avocados, and nuts and natural nut butters; can decrease cholesterol levels, while keeping levels of HDL cholesterol high   Polyunsaturated fat  found in oils such as safflower, sunflower, soybean, corn, and sesame; can decrease total cholesterol and LDL cholesterol   Omega-3 fatty acids  particularly those found in fatty fish (such as salmon, trout, tuna, mackerel, herring, and sardines); can decrease risk of arrhythmias, decrease triglyceride levels, and slightly lower blood pressure   The fats that you want to limit are:   Saturated fat  found in animal products, many fast foods, and a few vegetables; increases total blood cholesterol, including LDL levels   Animal fats that are saturated include: butter, lard, whole-milk dairy products, meat fat, and poultry skin   Vegetable fats that are saturated include: hydrogenated shortening, palm oil, coconut oil, cocoa butter   Hydrogenated or trans fat  found in margarine and vegetable shortening, most shelf stable snack foods, and fried foods; increases LDL and decreases HDL     It is generally recommended that you limit your total fat for the day to less than 30% of your total calories.  If you follow an 1800-calorie heart healthy diet, for example, this would mean 60 than 1 alcohol drink a day if you are a woman. Drink no more than 2 alcohol drinks a day if you are a man. Do not smoke. Smoking can make bones thin faster. If you need help quitting, talk to your doctor about stop-smoking programs and medicines. These can increase your chances of quitting for good. When should you call for help? Watch closely for changes in your health, and be sure to contact your doctor if:  You need help with a healthy eating plan. You need help with an exercise plan    © 2006-2012 [employer]. Care instructions adapted under license by [facility]. This care instruction is for use with your licensed healthcare professional. If you have questions about a medical condition or this instruction, always ask your healthcare professional. [place] [address] any warranty or liability for your use of this information. Content Version: 9.4.70991; Last Revised: June 20, 2011              ·     Alcohol Abuse and Alcoholism   (Alcohol Dependence; Alcohol Use Disorder)       Definition   Alcohol abuse is excessive or problematic alcohol consumption. It can progress to alcoholism. Alcoholism is chronic alcohol abuse that results in a physical dependence on alcohol (withdrawal symptoms) and an inability to stop or limit drinking. Causes   Several factors can contribute to alcohol abuse and alcoholism, including:   Genes   Brain chemicals that may be different than normal   Social pressure   Emotional stress   Pain   Depression and other mental health problems   Problem drinking behaviors learned from family or friends   Risk Factors   These factors increase your chance of developing alcoholism.  Tell your doctor if you have any of these risk factors:   Sex: male   Family members who abuse alcohol (especially men whose fathers or brothers are alcoholic)   Starting to use alcohol at an early age (younger than 15)   Using illicit drugs or non-medical use of and certain medicines. But, there are steps you can take to sharpen your mind and help preserve your memory. Challenge Your Brain   Regularly challenging your mind may help keeps it in top shape. Good mental exercises include:   Crossword puzzlesUse a dictionary if you need it; you will learn more that way. Brainteasers Try some! Crafts, such as wood working and sewing   Hobbies, such as gardening and building model airplanes   SocializingVisit old friends or join groups to meet new ones. Reading   Learning a new language   Taking a class, whether it be art history or jose miguel chi   TravelingExperience the food, history, and culture of your destination   Learning to use a computer   Going to museums, the theater, or thought-provoking movies   Changing things in your daily life, such as reversing your pattern in the grocery store or brushing your teeth using your nondominant hand   Use Memory Aids   There is no need to remember every detail on your own. These memory aids can help:   Calendars and day planners   Electronic organizers to store all sorts of helpful informationThese devices can \"beep\" to remind you of appointments. A book of days to record birthdays, anniversaries, and other occasions that occur on the same date every year   Detailed \"to-do\" lists and strategically placed sticky notes   Quick \"study\" sessionsBefore a gathering, review who will be there so their names will be fresh in your mind. Establish routinesFor example, keep your keys, wallet, and umbrella in the same place all the time or take medicine with your 8:00 AM glass of juice   Live a Healthy Life   Many actions that will keep your body strong will do the same for your mind. For example:   Talk to Your Doctor About Herbs and Supplements    Malnutrition and vitamin deficiencies can impair your mental function. For example, vitamin B12 deficiency can cause a range of symptoms, including confusion.  But, what if your nutritional needs strength, slowed reflexes)   Higher incidence of chronic health problems (eg, arthritis, diabetes) that may limit mobility, agility or sensory awareness   Side effects of medicine (eg, dizziness, blurred vision)especially medicines like prescription pain medicines and drugs used to treat mental health conditions   Depending on the brittleness of your bones, the consequences of a fall can be serious and long lasting. Home Life   Research by the Association of Aging [facility]) shows that some home accidents among older adults can be prevented by making simple lifestyle changes and basic modifications and repairs to the home environment. Here are some lifestyle changes that experts recommend:   Have your hearing and vision checked regularly. Be sure to wear prescription glasses that are right for you. Speak to your doctor or pharmacist about the possible side effects of your medicines. A number of medicines can cause dizziness. If you have problems with sleep, talk to your doctor. Limit your intake of alcohol. If necessary, use a cane or walker to help maintain your balance. Wear supportive, rubber-soled shoes, even at home. If you live in a region that gets wintry weather, you may want to put special cleats on your shoes to prevent you from slipping on the snow and ice. Exercise regularly to help maintain muscle tone, agility, and balance. Always hold the banister when going up or down stairs. Also, use  bars when getting in or out of the bath or shower, or using the toilet. To avoid dizziness, get up slowly from a lying down position. Sit up first, dangling your legs for a minute or two before rising to a standing position. Overall Home Safety Check   According to the Consumer Product Safety Commision's \"Older Consumer Home Safety Checklist,\" it is important to check for potential hazards in each room. And remember, proper lighting is an essential factor in home safety.  If you cannot see clearly, the sink, range, or working areas. Look for coffee pots, kettles and toaster ovens with automatic shut-offs. Keep a mop handy in the kitchen so you can wipe up spills instantly. You should also have a small fire extinguisher. Arrange your kitchen with frequently used items on lower shelves to avoid the need to stand on a stepstool to reach them. Make sure countertops are well-lit to avoid injuries while cutting and preparing food. In the Bathroom    Use a non-slip mat or decals in the tub and shower, since wet, soapy tile or porcelain surfaces are extremely slippery. Make sure bathroom rugs are non-skid or tape them firmly to the floor. Bathtubs should have at least one, preferably two, grab bars, firmly attached to structural supports in the wall. (Do not use built-in soap holders or glass shower doors as grab bars.)    Tub seats fitted with non-slip material on the legs allow you to wash sitting down. For people with limited mobility, bathtub transfer benches allow you to slide safely into the tub. Raised toilet seats and toilet safety rails are helpful for those with knee or hip problems. In the [facility]    Make sure you use a nightlight and that the area around your bed is clear of potential obstacles. Be careful with electric blankets and never go to sleep with a heating pad, which can cause serious burns even if on a low setting. Use fire-resistant mattress covers and pillows, and NEVER smoke in bed. Keep a phone next to the bed that is programmed to dial 911 at the push of a button. If you have a chronic condition, you may want to sign on with an automatic call-in service. Typically the system includes a small pendant that connects directly to an emergency medical voice-response system. You should also make arrangements to stay in contact with someonefriend, neighbor, family memberon a regular schedule.    Fire Prevention   According to the [facility]. [employer] can make it easier for your loved ones. Making plans while you are still able may also ease your mind and make your final days less stressful and more meaningful. Follow-up care is a key part of your treatment and safety. Be sure to make and go to all appointments, and call your doctor if you are having problems. It's also a good idea to know your test results and keep a list of the medicines you take. What can you do to plan for the end of life? You can bring these issues up with your doctor. You do not need to wait until your doctor starts the conversation. You might start with \"I would not be willing to live with . Harvis Pock Harvis Pock Harvis Pock \" When you complete this sentence it helps your doctor understand your wishes. Talk openly and honestly with your doctor. This is the best way to understand the decisions you will need to make as your health changes. Know that you can always change your mind. Ask your doctor about commonly used life-support measures. These include tube feedings, breathing machines, and fluids given through a vein (IV). Understanding these treatments will help you decide whether you want them. You may choose to have these life-supporting treatments for a limited time. This allows a trial period to see whether they will help you. You may also decide that you want your doctor to take only certain measures to keep you alive. It is important to spell out these conditions so that your doctor and family understand them. Talk to your doctor about how long you are likely to live. He or she may be able to give you an idea of what usually happens with your specific illness. Think about preparing papers that state your wishes. This way there will not be any confusion about what you want. You can change your instructions at any time. Which papers should you prepare? Advance directives are legal papers that tell doctors how you want to be cared for at the end of your life. You do not need a  to write these papers. will is a legal document. Each state has its own laws about living gay. And a living will may be called something else in your state. Here are some things to know about living gay. You don't need an  to complete a living will. But legal advice can be helpful if your state's laws are unclear. It can also help if your health history is complicated or your family can't agree on what should be in your living will. You can change your living will at any time. Some people find that their wishes about end-of-life care change as their health changes. If you make big changes to your living will, complete a new form. If you move to another state, make sure that your living will is legal in the state where you now live. In most cases, doctors will respect your wishes even if you have a form from a different state. You might use a universal form that has been approved by many states. This kind of form can sometimes be filled out and stored online. Your digital copy will then be available wherever you have a connection to the internet. The doctors and nurses who need to treat you can find it right away. Your state may offer an online registry. This is another place where you can store your living will online. It's a good idea to get your living will notarized. This means using a person called a  to watch two people sign, or witness, your living will. What should you know when you create a living will? Here are some questions to ask yourself as you make your living will:  Do you know enough about life support methods that might be used? If not, talk to your doctor so you know what might be done if you can't breathe on your own, your heart stops, or you can't swallow. What things would you still want to be able to do after you receive life-support methods? Would you want to be able to walk? To speak? To eat on your own? To live without the help of machines?   Do you want certain Islam practices performed if you become very ill? If you have a choice, where do you want to be cared for? In your home? At a hospital or nursing home? If you have a choice at the end of your life, where would you prefer to die? At home? In a hospital or nursing home? Somewhere else? Would you prefer to be buried or cremated? Do you want your organs to be donated after you die? What should you do with your living will? Make sure that your family members and your health care agent have copies of your living will (also called a declaration). Give your doctor a copy of your living will. Ask him or her to keep it as part of your medical record. If you have more than one doctor, make sure that each one has a copy. Put a copy of your living will where it can be easily found. For example, some people may put a copy on their refrigerator door. If you are using a digital copy, be sure your doctor, family members, and health care agent know how to find and access it. Where can you learn more? Go to https://Third Millennium Materialspepiceweb.[employer]. org and sign in to your S[employer]Call account. Enter I222 in the All My Data box to learn more about \"Learning About Living Sedrick Keren. \"     If you do not have an account, please click on the \"Sign Up Now\" link. Current as of: December 9, 2019               Content Version: 12.5  © 9263-6771 Healthwise, Incorporated. Care instructions adapted under license by [facility] ([facility]). If you have questions about a medical condition or this instruction, always ask your healthcare professional. [place] [address] any warranty or liability for your use of this information. ·        Learning About Medical Power of   What is a medical power of ? A medical power of , also called a durable power of  for health care, is one type of the legal forms called advance directives.  It lets you name the person you want to make treatment decisions for you if you can't speak or decide for yourself. The person you choose is called your health care agent. This person is also called a health care proxy or health care surrogate. A medical power of  may be called something else in your state. How do you choose a health care agent? Choose your health care agent carefully. This person may or may not be a family member. Talk to the person before you make your final decision. Make sure he or she is comfortable with this responsibility. It's a good idea to choose someone who:  Is at least 25years old. Knows you well and understands what makes life meaningful for you. Understands your Sabianism and moral values. Will do what you want, not what he or she wants. Will be able to make difficult choices at a stressful time. Will be able to refuse or stop treatment, if that is what you would want, even if you could die. Will be firm and confident with health professionals if needed. Will ask questions to get needed information. Lives near you or agrees to travel to you if needed. Your family may help you make medical decisions while you can still be part of that process. But it's important to choose one person to be your health care agent in case you aren't able to make decisions for yourself. If you don't fill out the legal form and name a health care agent, the decisions your family can make may be limited. A health care agent may be called something else in your state. Who will make decisions for you if you don't have a health care agent? If you don't have a health care agent or a living will, you may not get the care you want. Decisions may be made by family members who disagree about your medical care. Or decisions may be made by a medical professional who doesn't know you well. In some cases, a  makes the decisions. When you name a health care agent, it is very clear who has the power to make health decisions for you.   How do you name a health care for many problems, such as obesity, diabetes, heart disease, and high blood pressure. Get at least 30 minutes of exercise on most days of the week. Walking is a good choice. You also may want to do other activities, such as running, swimming, cycling, or playing tennis or team sports. Do not smoke. Smoking can make health problems worse. If you need help quitting, talk to your doctor about stop-smoking programs and medicines. These can increase your chances of quitting for good. Protect your skin from too much sun. When you're outdoors from 10 a.m. to 4 p.m., stay in the shade or cover up with clothing and a hat with a wide brim. Wear sunglasses that block UV rays. Even when it's cloudy, put broad-spectrum sunscreen (SPF 30 or higher) on any exposed skin. See a dentist one or two times a year for checkups and to have your teeth cleaned. Wear a seat belt in the car. Follow your doctor's advice about when to have certain tests. These tests can spot problems early. For men and women  Cholesterol. Your doctor will tell you how often to have this done based on your overall health and other things that can increase your risk for heart attack and stroke. Blood pressure. Have your blood pressure checked during a routine doctor visit. Your doctor will tell you how often to check your blood pressure based on your age, your blood pressure results, and other factors. Diabetes. Ask your doctor whether you should have tests for diabetes. Vision. Experts recommend that you have yearly exams for glaucoma and other age-related eye problems. Hearing. Tell your doctor if you notice any change in your hearing. You can have tests to find out how well you hear. Colon cancer tests. Keep having colon cancer tests as your doctor recommends. You can have one of several types of tests. Heart attack and stroke risk. At least every 4 to 6 years, you should have your risk for heart attack and stroke assessed.  Your doctor uses factors such as your age, blood pressure, cholesterol, and whether you smoke or have diabetes to show what your risk for a heart attack or stroke is over the next 10 years. Osteoporosis. Talk to your doctor about whether you should have a bone density test to find out whether you have thinning bones. Ask your doctor if you need to take a calcium plus vitamin D supplement. You may be able to get enough calcium and vitamin D through your diet. For women  Pap test and pelvic exam. You may no longer need a Pap test. Talk with your doctor about whether to stop or continue to have Pap tests. Breast exam and mammogram. Ask how often you should have a mammogram, which is an X-ray of your breasts. A mammogram can spot breast cancer before it can be felt and when it is easiest to treat. Thyroid disease. Talk to your doctor about whether to have your thyroid checked as part of a regular physical exam. Women have an increased chance of a thyroid problem. For men  Prostate exam. Talk to your doctor about whether you should have a blood test (called a PSA test) for prostate cancer. Experts recommend that you discuss the benefits and risks of the test with your doctor before you decide whether to have this test. Some experts say that men ages 79 and older no longer need testing. Abdominal aortic aneurysm. Ask your doctor whether you should have a test to check for an aneurysm. You may need a test if you ever smoked or if your parent, brother, sister, or child has had an aneurysm. When should you call for help? Watch closely for changes in your health, and be sure to contact your doctor if you have any problems or symptoms that concern you. Where can you learn more? Go to https://piyush.[employer]. org and sign in to your [employer] account. Enter A281 in the [facility] box to learn more about \"Well Visit, Over 65: Care Instructions. \"     If you do not have an account, please click on the \"Sign Up Now\" link. Current as of: August 22, 2019               Content Version: 12.5  © 8155-7748 Healthwise, Incorporated. Care instructions adapted under license by [facility] ([facility]). If you have questions about a medical condition or this instruction, always ask your healthcare professional. [address] any warranty or liability for your use of this information.

## 2020-06-20 DIAGNOSIS — R73.9 HYPERGLYCEMIA: ICD-10-CM

## 2020-06-20 DIAGNOSIS — Z12.5 PROSTATE CANCER SCREENING: ICD-10-CM

## 2020-06-20 DIAGNOSIS — E78.5 HYPERLIPIDEMIA, UNSPECIFIED HYPERLIPIDEMIA TYPE: ICD-10-CM

## 2020-06-20 DIAGNOSIS — Z00.00 PREVENTATIVE HEALTH CARE: ICD-10-CM

## 2020-06-20 LAB
ALBUMIN SERPL-MCNC: 3.9 G/DL (ref 3.5–4.6)
ALP BLD-CCNC: 44 U/L (ref 35–104)
ALT SERPL-CCNC: 13 U/L (ref 0–41)
ANION GAP SERPL CALCULATED.3IONS-SCNC: 11 MEQ/L (ref 9–15)
AST SERPL-CCNC: 14 U/L (ref 0–40)
BASOPHILS ABSOLUTE: 0.1 K/UL (ref 0–0.2)
BASOPHILS RELATIVE PERCENT: 0.8 %
BILIRUB SERPL-MCNC: 0.6 MG/DL (ref 0.2–0.7)
BUN BLDV-MCNC: 17 MG/DL (ref 8–23)
CALCIUM SERPL-MCNC: 9 MG/DL (ref 8.5–9.9)
CHLORIDE BLD-SCNC: 102 MEQ/L (ref 95–107)
CHOLESTEROL, TOTAL: 182 MG/DL (ref 0–199)
CO2: 25 MEQ/L (ref 20–31)
CREAT SERPL-MCNC: 0.98 MG/DL (ref 0.7–1.2)
EOSINOPHILS ABSOLUTE: 0.5 K/UL (ref 0–0.7)
EOSINOPHILS RELATIVE PERCENT: 7.2 %
GFR AFRICAN AMERICAN: >60
GFR NON-AFRICAN AMERICAN: >60
GLOBULIN: 2.6 G/DL (ref 2.3–3.5)
GLUCOSE BLD-MCNC: 100 MG/DL (ref 70–99)
HBA1C MFR BLD: 5.6 % (ref 4.8–5.9)
HCT VFR BLD CALC: 47.2 % (ref 42–52)
HDLC SERPL-MCNC: 35 MG/DL (ref 40–59)
HEMOGLOBIN: 15.5 G/DL (ref 14–18)
LDL CHOLESTEROL CALCULATED: 107 MG/DL (ref 0–129)
LYMPHOCYTES ABSOLUTE: 1 K/UL (ref 1–4.8)
LYMPHOCYTES RELATIVE PERCENT: 13.5 %
MCH RBC QN AUTO: 28.5 PG (ref 27–31.3)
MCHC RBC AUTO-ENTMCNC: 32.8 % (ref 33–37)
MCV RBC AUTO: 87.1 FL (ref 80–100)
MONOCYTES ABSOLUTE: 0.7 K/UL (ref 0.2–0.8)
MONOCYTES RELATIVE PERCENT: 9.1 %
NEUTROPHILS ABSOLUTE: 5.1 K/UL (ref 1.4–6.5)
NEUTROPHILS RELATIVE PERCENT: 69.4 %
PDW BLD-RTO: 13.3 % (ref 11.5–14.5)
PLATELET # BLD: 185 K/UL (ref 130–400)
POTASSIUM SERPL-SCNC: 3.6 MEQ/L (ref 3.4–4.9)
PROSTATE SPECIFIC ANTIGEN: 0.59 NG/ML (ref 0–5.4)
RBC # BLD: 5.42 M/UL (ref 4.7–6.1)
SODIUM BLD-SCNC: 138 MEQ/L (ref 135–144)
TOTAL PROTEIN: 6.5 G/DL (ref 6.3–8)
TRIGL SERPL-MCNC: 200 MG/DL (ref 0–150)
WBC # BLD: 7.4 K/UL (ref 4.8–10.8)

## 2020-06-25 RX ORDER — LISINOPRIL AND HYDROCHLOROTHIAZIDE 20; 12.5 MG/1; MG/1
TABLET ORAL
Qty: 90 TABLET | Refills: 3 | Status: SHIPPED | OUTPATIENT
Start: 2020-06-25 | End: 2020-11-20 | Stop reason: SDUPTHER

## 2020-06-25 RX ORDER — LISINOPRIL 40 MG/1
TABLET ORAL
Qty: 90 TABLET | Refills: 0 | Status: SHIPPED | OUTPATIENT
Start: 2020-06-25 | End: 2020-09-17

## 2020-06-25 NOTE — TELEPHONE ENCOUNTER
Pharmacy requesting medication refill. Please approve or deny this request.    Rx requested:  Requested Prescriptions     Pending Prescriptions Disp Refills    lisinopril (PRINIVIL;ZESTRIL) 40 MG tablet [Pharmacy Med Name: LISINOPRIL TAB 40MG] 90 tablet 0     Sig: TAKE 1 TABLET DAILY         Last Office Visit:   5/14/2020      Next Visit Date:  No future appointments.

## 2020-08-30 ENCOUNTER — TELEPHONE (OUTPATIENT)
Dept: FAMILY MEDICINE CLINIC | Age: 70
End: 2020-08-30

## 2020-09-17 RX ORDER — LISINOPRIL 40 MG/1
TABLET ORAL
Qty: 90 TABLET | Refills: 0 | Status: SHIPPED | OUTPATIENT
Start: 2020-09-17 | End: 2020-11-27 | Stop reason: SDUPTHER

## 2020-10-05 ENCOUNTER — TELEPHONE (OUTPATIENT)
Dept: PRIMARY CARE CLINIC | Age: 70
End: 2020-10-05

## 2020-10-05 NOTE — TELEPHONE ENCOUNTER
Ekta Jefferson was contacted to set up a video visit with Jesse Soto MD.     Spoke with: Patient    Patient encouraged to sign up for MyChart in order to complete Virtual Visits, if MyChart status was not already active. Patient not agreeableto sign up for a Virtual Visit with PCP within the next week. Reason for declining VV with PCP in the next week:  Other - Not at this time       Jayna Villeda

## 2020-11-20 ENCOUNTER — TELEPHONE (OUTPATIENT)
Dept: PRIMARY CARE CLINIC | Age: 70
End: 2020-11-20

## 2020-11-20 RX ORDER — LISINOPRIL AND HYDROCHLOROTHIAZIDE 20; 12.5 MG/1; MG/1
TABLET ORAL
Qty: 90 TABLET | Refills: 2 | Status: SHIPPED | OUTPATIENT
Start: 2020-11-20 | End: 2020-11-27 | Stop reason: SDUPTHER

## 2020-11-20 NOTE — TELEPHONE ENCOUNTER
Rx request   Requested Prescriptions     Pending Prescriptions Disp Refills    lisinopril-hydroCHLOROthiazide (PRINZIDE;ZESTORETIC) 20-12.5 MG per tablet 90 tablet 3     Si pill qd po     LOV Visit date not found  Next Visit Date:  No future appointments.

## 2020-11-20 NOTE — TELEPHONE ENCOUNTER
Mazin needs to verify which script pt is supposed to be taking: lisinopril 40mg or lisinopril-hctz 20-12.5    Call back # [phone] ref# [id number]

## 2020-11-27 ENCOUNTER — TELEPHONE (OUTPATIENT)
Dept: PRIMARY CARE CLINIC | Age: 70
End: 2020-11-27

## 2020-11-27 RX ORDER — LISINOPRIL AND HYDROCHLOROTHIAZIDE 20; 12.5 MG/1; MG/1
TABLET ORAL
Qty: 90 TABLET | Refills: 2 | Status: SHIPPED | OUTPATIENT
Start: 2020-11-27 | End: 2020-12-07 | Stop reason: SDUPTHER

## 2020-11-27 RX ORDER — LISINOPRIL 40 MG/1
40 TABLET ORAL DAILY
Qty: 90 TABLET | Refills: 1 | Status: SHIPPED | OUTPATIENT
Start: 2020-11-27 | End: 2020-12-07 | Stop reason: SDUPTHER

## 2020-11-27 RX ORDER — LISINOPRIL AND HYDROCHLOROTHIAZIDE 20; 12.5 MG/1; MG/1
TABLET ORAL
Qty: 90 TABLET | Refills: 2 | Status: CANCELLED | OUTPATIENT
Start: 2020-11-27

## 2020-11-27 NOTE — TELEPHONE ENCOUNTER
Pt called stating he needs lisinopril 20-12.5 called in. Told him pharmacy called about this to verify dose and was told 40mg. Pt states he takes both lisinopril 40mg and 20-12.5. Is this correct?   If so he needs 20-12.5 sent into [facility]

## 2020-12-07 ENCOUNTER — OFFICE VISIT (OUTPATIENT)
Dept: PRIMARY CARE CLINIC | Age: 70
End: 2020-12-07
Payer: MEDICARE

## 2020-12-07 VITALS
HEIGHT: 72 IN | WEIGHT: 315 LBS | TEMPERATURE: 97.4 F | OXYGEN SATURATION: 96 % | HEART RATE: 59 BPM | DIASTOLIC BLOOD PRESSURE: 82 MMHG | RESPIRATION RATE: 14 BRPM | BODY MASS INDEX: 42.66 KG/M2 | SYSTOLIC BLOOD PRESSURE: 138 MMHG

## 2020-12-07 PROCEDURE — G0009 ADMIN PNEUMOCOCCAL VACCINE: HCPCS | Performed by: INTERNAL MEDICINE

## 2020-12-07 PROCEDURE — 90732 PPSV23 VACC 2 YRS+ SUBQ/IM: CPT | Performed by: INTERNAL MEDICINE

## 2020-12-07 PROCEDURE — 99213 OFFICE O/P EST LOW 20 MIN: CPT | Performed by: INTERNAL MEDICINE

## 2020-12-07 RX ORDER — IBUPROFEN 800 MG/1
TABLET ORAL
Qty: 180 TABLET | Refills: 3 | Status: SHIPPED | OUTPATIENT
Start: 2020-12-07 | End: 2021-06-15 | Stop reason: SDUPTHER

## 2020-12-07 RX ORDER — ATENOLOL 50 MG/1
TABLET ORAL
Qty: 180 TABLET | Refills: 3 | Status: SHIPPED | OUTPATIENT
Start: 2020-12-07 | End: 2021-06-15 | Stop reason: SDUPTHER

## 2020-12-07 RX ORDER — OMEPRAZOLE 20 MG/1
20 CAPSULE, DELAYED RELEASE ORAL DAILY
Qty: 90 CAPSULE | Refills: 3 | Status: SHIPPED | OUTPATIENT
Start: 2020-12-07 | End: 2021-06-15 | Stop reason: SDUPTHER

## 2020-12-07 RX ORDER — LISINOPRIL AND HYDROCHLOROTHIAZIDE 20; 12.5 MG/1; MG/1
TABLET ORAL
Qty: 90 TABLET | Refills: 3 | Status: SHIPPED | OUTPATIENT
Start: 2020-12-07 | End: 2021-06-15 | Stop reason: SDUPTHER

## 2020-12-07 RX ORDER — AMLODIPINE BESYLATE 5 MG/1
5 TABLET ORAL DAILY
Qty: 90 TABLET | Refills: 3 | Status: SHIPPED | OUTPATIENT
Start: 2020-12-07 | End: 2021-06-15 | Stop reason: SDUPTHER

## 2020-12-07 RX ORDER — LISINOPRIL 40 MG/1
40 TABLET ORAL DAILY
Qty: 90 TABLET | Refills: 3 | Status: SHIPPED | OUTPATIENT
Start: 2020-12-07 | End: 2021-06-15 | Stop reason: CLARIF

## 2020-12-07 NOTE — PROGRESS NOTES
Becky King 78 y.o. male presents today with   Chief Complaint   Patient presents with    Hypertension    Gastroesophageal Reflux       Hypertension  This is a chronic problem. The current episode started more than 1 year ago. The problem is unchanged. The problem is controlled. Associated symptoms include anxiety. Pertinent negatives include no chest pain, palpitations or shortness of breath. Gastroesophageal Reflux  He complains of heartburn. He reports no chest pain, no choking or no nausea. This is a recurrent problem. The current episode started more than 1 year ago. The problem occurs frequently. The problem has been waxing and waning. Pertinent negatives include no fatigue. Knee Pain   Incident onset: years. The incident occurred at home. There was no injury mechanism. The pain is present in the left knee. The quality of the pain is described as aching. The pain is at a severity of 4/10. The pain is moderate. The pain has been worsening since onset.        Past Medical History:   Diagnosis Date    GERD (gastroesophageal reflux disease)     Hyperlipidemia     Hypertension      Patient Active Problem List    Diagnosis Date Noted    Acute bronchitis 01/17/2017     Past Surgical History:   Procedure Laterality Date    CARPAL TUNNEL RELEASE      COLONOSCOPY      VASECTOMY       Family History   Problem Relation Age of Onset    Cancer Mother         uterine, breast, skin    Stroke Brother      Social History     Socioeconomic History    Marital status:      Spouse name: None    Number of children: None    Years of education: None    Highest education level: None   Occupational History    None   Social Needs    Financial resource strain: Not hard at all   Annita-Elizabeth insecurity     Worry: Never true     Inability: Never true   [address] needs     Medical: None     Non-medical: None   Tobacco Use    Smoking status: Current Some Day Smoker     Types: Cigarettes    Smokeless tobacco: Never Used    Tobacco comment: cigars, weekly   Substance and Sexual Activity    Alcohol use: Yes     Alcohol/week: 0.0 standard drinks     Comment: rare    Drug use: No    Sexual activity: Not Currently   Lifestyle    Physical activity     Days per week: None     Minutes per session: None    Stress: None   Relationships    Social connections     Talks on phone: None     Gets together: None     Attends Anabaptism service: None     Active member of club or organization: None     Attends meetings of clubs or organizations: None     Relationship status: None    Intimate partner violence     Fear of current or ex partner: None     Emotionally abused: None     Physically abused: None     Forced sexual activity: None   Other Topics Concern    None   Social History Narrative    None     No Known Allergies    Review of Systems   Constitutional: Negative for fatigue and fever. HENT: Negative for trouble swallowing and voice change. Eyes: Negative for photophobia and visual disturbance. Respiratory: Negative for choking and shortness of breath. Cardiovascular: Negative for chest pain and palpitations. Gastrointestinal: Positive for heartburn. Negative for nausea and vomiting. Genitourinary: Negative for decreased urine volume, testicular pain and urgency. Musculoskeletal: Positive for arthralgias and back pain. Skin: Negative for rash. Neurological: Negative for tremors and syncope. Hematological: Does not bruise/bleed easily. Psychiatric/Behavioral: Negative for suicidal ideas. Vitals:    12/07/20 1516   BP: 138/82   Pulse: 59   Resp: 14   Temp: 97.4 °F (36.3 °C)   SpO2: 96%   Weight: (!) 349 lb (158.3 kg)   Height: 6' (1.829 m)       Physical Exam  Constitutional:       Appearance: He is well-developed. HENT:      Head: Normocephalic and atraumatic. Eyes:      Conjunctiva/sclera: Conjunctivae normal.      Pupils: Pupils are equal, round, and reactive to light.    Neck: Musculoskeletal: Normal range of motion. Cardiovascular:      Rate and Rhythm: Normal rate and regular rhythm. Pulmonary:      Effort: Pulmonary effort is normal. No respiratory distress. Abdominal:      General: There is no distension. Musculoskeletal: Normal range of motion. Skin:     General: Skin is dry. Coloration: Skin is not jaundiced. Neurological:      Mental Status: He is alert. Psychiatric:         Mood and Affect: Mood normal.        Assessment/Plan  Celsa Brown was seen today for hypertension and gastroesophageal reflux. Diagnoses and all orders for this visit:    Essential hypertension  -     lisinopril (PRINIVIL;ZESTRIL) 40 MG tablet; Take 1 tablet by mouth daily  -     lisinopril-hydroCHLOROthiazide (PRINZIDE;ZESTORETIC) 20-12.5 MG per tablet; 1 pill qd po  -     amLODIPine (NORVASC) 5 MG tablet; Take 1 tablet by mouth daily  -     atenolol (TENORMIN) 50 MG tablet; TAKE 1 TABLET TWICE A DAY    Left knee pain, unspecified chronicity  -     ibuprofen (ADVIL;MOTRIN) 800 MG tablet; TAKE 1 TABLET TWICE A DAY  AS NEEDED FOR PAIN    Gastroesophageal reflux disease without esophagitis  -     omeprazole (PRILOSEC) 20 MG delayed release capsule; Take 1 capsule by mouth Daily    Other orders  -     Pneumococcal polysaccharide vaccine 23-valent greater than or equal to 3yo subcutaneous/IM        No follow-ups on file.     Vidhya Contreras MD

## 2020-12-16 ASSESSMENT — ENCOUNTER SYMPTOMS
VOICE CHANGE: 0
TROUBLE SWALLOWING: 0
HEARTBURN: 1
BACK PAIN: 1
CHOKING: 0
VOMITING: 0
PHOTOPHOBIA: 0
NAUSEA: 0
SHORTNESS OF BREATH: 0

## 2021-06-15 ENCOUNTER — OFFICE VISIT (OUTPATIENT)
Dept: PRIMARY CARE CLINIC | Age: 71
End: 2021-06-15
Payer: MEDICARE

## 2021-06-15 VITALS
HEART RATE: 62 BPM | WEIGHT: 315 LBS | OXYGEN SATURATION: 98 % | SYSTOLIC BLOOD PRESSURE: 162 MMHG | DIASTOLIC BLOOD PRESSURE: 94 MMHG | HEIGHT: 72 IN | BODY MASS INDEX: 42.66 KG/M2 | TEMPERATURE: 97.9 F

## 2021-06-15 DIAGNOSIS — R73.9 HYPERGLYCEMIA: ICD-10-CM

## 2021-06-15 DIAGNOSIS — Z12.5 PROSTATE CANCER SCREENING: ICD-10-CM

## 2021-06-15 DIAGNOSIS — Z00.00 PREVENTATIVE HEALTH CARE: ICD-10-CM

## 2021-06-15 DIAGNOSIS — K21.9 GASTROESOPHAGEAL REFLUX DISEASE WITHOUT ESOPHAGITIS: ICD-10-CM

## 2021-06-15 DIAGNOSIS — I10 ESSENTIAL HYPERTENSION: Primary | ICD-10-CM

## 2021-06-15 DIAGNOSIS — M25.562 LEFT KNEE PAIN, UNSPECIFIED CHRONICITY: ICD-10-CM

## 2021-06-15 DIAGNOSIS — E78.5 HYPERLIPIDEMIA, UNSPECIFIED HYPERLIPIDEMIA TYPE: ICD-10-CM

## 2021-06-15 PROBLEM — M17.12 UNILATERAL PRIMARY OSTEOARTHRITIS, LEFT KNEE: Status: ACTIVE | Noted: 2019-03-11

## 2021-06-15 PROCEDURE — 99213 OFFICE O/P EST LOW 20 MIN: CPT | Performed by: INTERNAL MEDICINE

## 2021-06-15 RX ORDER — LISINOPRIL 40 MG/1
40 TABLET ORAL DAILY
Qty: 90 TABLET | Refills: 3 | Status: SHIPPED | OUTPATIENT
Start: 2021-06-15 | End: 2021-07-19 | Stop reason: ALTCHOICE

## 2021-06-15 RX ORDER — AMLODIPINE BESYLATE 5 MG/1
5 TABLET ORAL DAILY
Qty: 90 TABLET | Refills: 3 | Status: SHIPPED | OUTPATIENT
Start: 2021-06-15 | End: 2022-06-27 | Stop reason: SDUPTHER

## 2021-06-15 RX ORDER — LISINOPRIL 40 MG/1
40 TABLET ORAL DAILY
COMMUNITY
End: 2022-06-27 | Stop reason: SDUPTHER

## 2021-06-15 RX ORDER — IBUPROFEN 800 MG/1
TABLET ORAL
Qty: 180 TABLET | Refills: 3 | Status: SHIPPED | OUTPATIENT
Start: 2021-06-15 | End: 2022-06-27 | Stop reason: SDUPTHER

## 2021-06-15 RX ORDER — OMEPRAZOLE 20 MG/1
20 CAPSULE, DELAYED RELEASE ORAL DAILY
Qty: 90 CAPSULE | Refills: 3 | Status: SHIPPED | OUTPATIENT
Start: 2021-06-15 | End: 2022-06-27 | Stop reason: SDUPTHER

## 2021-06-15 RX ORDER — LISINOPRIL AND HYDROCHLOROTHIAZIDE 20; 12.5 MG/1; MG/1
TABLET ORAL
Qty: 90 TABLET | Refills: 3 | Status: SHIPPED | OUTPATIENT
Start: 2021-06-15 | End: 2022-05-18

## 2021-06-15 RX ORDER — ATENOLOL 50 MG/1
TABLET ORAL
Qty: 180 TABLET | Refills: 3 | Status: SHIPPED | OUTPATIENT
Start: 2021-06-15 | End: 2022-05-18 | Stop reason: SINTOL

## 2021-06-15 SDOH — ECONOMIC STABILITY: TRANSPORTATION INSECURITY
IN THE PAST 12 MONTHS, HAS THE LACK OF TRANSPORTATION KEPT YOU FROM MEDICAL APPOINTMENTS OR FROM GETTING MEDICATIONS?: NO

## 2021-06-15 SDOH — ECONOMIC STABILITY: FOOD INSECURITY: WITHIN THE PAST 12 MONTHS, THE FOOD YOU BOUGHT JUST DIDN'T LAST AND YOU DIDN'T HAVE MONEY TO GET MORE.: NEVER TRUE

## 2021-06-15 SDOH — ECONOMIC STABILITY: TRANSPORTATION INSECURITY
IN THE PAST 12 MONTHS, HAS LACK OF TRANSPORTATION KEPT YOU FROM MEETINGS, WORK, OR FROM GETTING THINGS NEEDED FOR DAILY LIVING?: NO

## 2021-06-15 SDOH — ECONOMIC STABILITY: FOOD INSECURITY: WITHIN THE PAST 12 MONTHS, YOU WORRIED THAT YOUR FOOD WOULD RUN OUT BEFORE YOU GOT MONEY TO BUY MORE.: NEVER TRUE

## 2021-06-15 ASSESSMENT — PATIENT HEALTH QUESTIONNAIRE - PHQ9
2. FEELING DOWN, DEPRESSED OR HOPELESS: 0
SUM OF ALL RESPONSES TO PHQ9 QUESTIONS 1 & 2: 0
SUM OF ALL RESPONSES TO PHQ QUESTIONS 1-9: 0
SUM OF ALL RESPONSES TO PHQ QUESTIONS 1-9: 0
1. LITTLE INTEREST OR PLEASURE IN DOING THINGS: 0
SUM OF ALL RESPONSES TO PHQ QUESTIONS 1-9: 0

## 2021-06-15 ASSESSMENT — SOCIAL DETERMINANTS OF HEALTH (SDOH): HOW HARD IS IT FOR YOU TO PAY FOR THE VERY BASICS LIKE FOOD, HOUSING, MEDICAL CARE, AND HEATING?: NOT HARD AT ALL

## 2021-06-15 NOTE — PROGRESS NOTES
Mara Dies 73 y.o. male presents today with   Chief Complaint   Patient presents with    6 Month Follow-Up    Hypertension    Health Maintenance     Pt declines colonoscopy       Hypertension  This is a chronic problem. The current episode started more than 1 year ago. The problem is unchanged. The problem is controlled. Associated symptoms include anxiety. Pertinent negatives include no chest pain. Gastroesophageal Reflux  He complains of heartburn. He reports no chest pain. This is a recurrent problem. The current episode started more than 1 year ago. The problem has been waxing and waning. The heartburn duration is several minutes. Past Medical History:   Diagnosis Date    GERD (gastroesophageal reflux disease)     Hyperlipidemia     Hypertension      Patient Active Problem List    Diagnosis Date Noted    Pain in left knee 03/11/2019    Unilateral primary osteoarthritis, left knee 03/11/2019    Unilateral primary osteoarthritis, left knee 03/11/2019    Acute bronchitis 01/17/2017     Past Surgical History:   Procedure Laterality Date    CARPAL TUNNEL RELEASE      COLONOSCOPY      VASECTOMY       Family History   Problem Relation Age of Onset    Cancer Mother         uterine, breast, skin    Stroke Brother      Social History     Socioeconomic History    Marital status:      Spouse name: None    Number of children: None    Years of education: None    Highest education level: None   Occupational History    None   Tobacco Use    Smoking status: Never Smoker    Smokeless tobacco: Never Used    Tobacco comment: cigars, weekly   Vaping Use    Vaping Use: Never used   Substance and Sexual Activity    Alcohol use:  Yes     Alcohol/week: 0.0 standard drinks     Comment: rare    Drug use: No    Sexual activity: Not Currently   Other Topics Concern    None   Social History Narrative    None     Social Determinants of Health     Financial Resource Strain: Low Risk     Difficulty of Paying Living Expenses: Not hard at all   Food Insecurity: No Food Insecurity    Worried About Running Out of Food in the Last Year: Never true    Ran Out of Food in the Last Year: Never true   Transportation Needs: No Transportation Needs    Lack of Transportation (Medical): No    Lack of Transportation (Non-Medical): No   Physical Activity:     Days of Exercise per Week:     Minutes of Exercise per Session:    Stress:     Feeling of Stress :    Social Connections:     Frequency of Communication with Friends and Family:     Frequency of Social Gatherings with Friends and Family:     Attends Yazidi Services:     Active Member of Clubs or Organizations:     Attends Club or Organization Meetings:     Marital Status:    Intimate Partner Violence:     Fear of Current or Ex-Partner:     Emotionally Abused:     Physically Abused:     Sexually Abused:      No Known Allergies    Review of Systems   Cardiovascular: Negative for chest pain. Gastrointestinal: Positive for heartburn. Vitals:    06/15/21 1505 06/15/21 1519   BP: (!) 166/92 (!) 162/94   Pulse: 62    Temp: 97.9 °F (36.6 °C)    SpO2: 98%    Weight: (!) 347 lb (157.4 kg)    Height: 6' (1.829 m)        Physical Exam   Assessment/Plan  Mauricio Perez was seen today for 6 month follow-up, hypertension and health maintenance. Diagnoses and all orders for this visit:    Essential hypertension  -     lisinopril-hydroCHLOROthiazide (PRINZIDE;ZESTORETIC) 20-12.5 MG per tablet; 1 pill qd po  -     atenolol (TENORMIN) 50 MG tablet; TAKE 1 TABLET TWICE A DAY  -     amLODIPine (NORVASC) 5 MG tablet; Take 1 tablet by mouth daily  -     lisinopril (PRINIVIL;ZESTRIL) 40 MG tablet; Take 1 tablet by mouth daily  -     Comprehensive Metabolic Panel; Future  -     CBC With Auto Differential; Future    Gastroesophageal reflux disease without esophagitis  -     omeprazole (PRILOSEC) 20 MG delayed release capsule;  Take 1 capsule by mouth

## 2021-06-22 ENCOUNTER — HOSPITAL ENCOUNTER (OUTPATIENT)
Dept: LAB | Age: 71
Discharge: HOME | End: 2021-06-22
Payer: MEDICARE

## 2021-06-22 LAB
ALBUMIN SERPL-MCNC: 4.1 G/DL (ref 3.5–4.6)
ALP BLD-CCNC: 53 U/L (ref 35–104)
ALT SERPL-CCNC: 20 U/L (ref 0–41)
ANION GAP SERPL CALCULATED.3IONS-SCNC: 16 MEQ/L (ref 9–15)
AST SERPL-CCNC: 20 U/L (ref 0–40)
BASOPHILS ABSOLUTE: 0.1 K/UL (ref 0–0.2)
BASOPHILS RELATIVE PERCENT: 0.9 %
BILIRUB SERPL-MCNC: 0.3 MG/DL (ref 0.2–0.7)
BUN BLDV-MCNC: 21 MG/DL (ref 8–23)
CALCIUM SERPL-MCNC: 9.4 MG/DL (ref 8.5–9.9)
CHLORIDE BLD-SCNC: 103 MEQ/L (ref 95–107)
CHOLESTEROL, TOTAL: 192 MG/DL (ref 0–199)
CO2: 24 MEQ/L (ref 20–31)
CREAT SERPL-MCNC: 1.06 MG/DL (ref 0.7–1.2)
EOSINOPHILS ABSOLUTE: 0.4 K/UL (ref 0–0.7)
EOSINOPHILS RELATIVE PERCENT: 5.5 %
GFR AFRICAN AMERICAN: >60
GFR NON-AFRICAN AMERICAN: >60
GLOBULIN: 3 G/DL (ref 2.3–3.5)
GLUCOSE BLD-MCNC: 94 MG/DL (ref 70–99)
HBA1C MFR BLD: 5.6 % (ref 4.8–5.9)
HCT VFR BLD CALC: 46.9 % (ref 42–52)
HDLC SERPL-MCNC: 35 MG/DL (ref 40–59)
HEMOGLOBIN: 15.5 G/DL (ref 14–18)
LDL CHOLESTEROL CALCULATED: 107 MG/DL (ref 0–129)
LYMPHOCYTES ABSOLUTE: 1.2 K/UL (ref 1–4.8)
LYMPHOCYTES RELATIVE PERCENT: 17.1 %
MCH RBC QN AUTO: 28.5 PG (ref 27–31.3)
MCHC RBC AUTO-ENTMCNC: 33 % (ref 33–37)
MCV RBC AUTO: 86.4 FL (ref 80–100)
MONOCYTES ABSOLUTE: 0.6 K/UL (ref 0.2–0.8)
MONOCYTES RELATIVE PERCENT: 8.8 %
NEUTROPHILS ABSOLUTE: 4.8 K/UL (ref 1.4–6.5)
NEUTROPHILS RELATIVE PERCENT: 67.7 %
PDW BLD-RTO: 13.1 % (ref 11.5–14.5)
PLATELET # BLD: 209 K/UL (ref 130–400)
POTASSIUM SERPL-SCNC: 3.4 MEQ/L (ref 3.4–4.9)
PROSTATE SPECIFIC ANTIGEN: 0.62 NG/ML (ref 0–6.22)
RBC # BLD: 5.43 M/UL (ref 4.7–6.1)
SODIUM BLD-SCNC: 143 MEQ/L (ref 135–144)
TOTAL PROTEIN: 7.1 G/DL (ref 6.3–8)
TRIGL SERPL-MCNC: 248 MG/DL (ref 0–150)
WBC # BLD: 7.1 K/UL (ref 4.8–10.8)

## 2021-06-22 PROCEDURE — 80061 LIPID PANEL: CPT

## 2021-06-22 PROCEDURE — G0103 PSA SCREENING: HCPCS

## 2021-06-22 PROCEDURE — 83036 HEMOGLOBIN GLYCOSYLATED A1C: CPT

## 2021-06-22 PROCEDURE — 85025 COMPLETE CBC W/AUTO DIFF WBC: CPT

## 2021-06-22 PROCEDURE — 84153 ASSAY OF PSA TOTAL: CPT

## 2021-06-22 PROCEDURE — 80053 COMPREHEN METABOLIC PANEL: CPT

## 2021-06-28 ASSESSMENT — ENCOUNTER SYMPTOMS: HEARTBURN: 1

## 2021-07-14 ENCOUNTER — TELEPHONE (OUTPATIENT)
Dept: PRIMARY CARE CLINIC | Age: 71
End: 2021-07-14

## 2021-07-19 ENCOUNTER — OFFICE VISIT (OUTPATIENT)
Dept: PRIMARY CARE CLINIC | Age: 71
End: 2021-07-19
Payer: MEDICARE

## 2021-07-19 VITALS
WEIGHT: 315 LBS | HEIGHT: 73 IN | SYSTOLIC BLOOD PRESSURE: 152 MMHG | BODY MASS INDEX: 41.75 KG/M2 | HEART RATE: 74 BPM | DIASTOLIC BLOOD PRESSURE: 92 MMHG | OXYGEN SATURATION: 91 %

## 2021-07-19 DIAGNOSIS — M25.559 HIP PAIN: Primary | ICD-10-CM

## 2021-07-19 PROCEDURE — 99213 OFFICE O/P EST LOW 20 MIN: CPT | Performed by: INTERNAL MEDICINE

## 2021-07-19 RX ORDER — HYDROCODONE BITARTRATE AND ACETAMINOPHEN 5; 325 MG/1; MG/1
1 TABLET ORAL EVERY 6 HOURS PRN
Qty: 28 TABLET | Refills: 0 | Status: SHIPPED | OUTPATIENT
Start: 2021-07-19 | End: 2021-07-26

## 2021-07-19 ASSESSMENT — ENCOUNTER SYMPTOMS
VOMITING: 0
CHOKING: 0
SHORTNESS OF BREATH: 0
BACK PAIN: 0
TROUBLE SWALLOWING: 0
VOICE CHANGE: 0
NAUSEA: 0
PHOTOPHOBIA: 0

## 2021-07-19 NOTE — PROGRESS NOTES
Ishan Quesada 73 y.o. male presents today with   Chief Complaint   Patient presents with    Hip Pain     right hip pain, onset 3 weeks constant pain, was seen at urgent care 4 days ago. scale 1-10 pain he is at an 8        Hip Pain   The incident occurred more than 1 week ago. The incident occurred at home. There was no injury mechanism. The pain is present in the right hip. The pain is at a severity of 4/10. The pain is moderate. The pain has been fluctuating since onset. Past Medical History:   Diagnosis Date    GERD (gastroesophageal reflux disease)     Hyperlipidemia     Hypertension      Patient Active Problem List    Diagnosis Date Noted    Pain in left knee 03/11/2019    Unilateral primary osteoarthritis, left knee 03/11/2019    Unilateral primary osteoarthritis, left knee 03/11/2019    Acute bronchitis 01/17/2017     Past Surgical History:   Procedure Laterality Date    CARPAL TUNNEL RELEASE      COLONOSCOPY      VASECTOMY       Family History   Problem Relation Age of Onset    Cancer Mother         uterine, breast, skin    Stroke Brother      Social History     Socioeconomic History    Marital status:      Spouse name: None    Number of children: None    Years of education: None    Highest education level: None   Occupational History    None   Tobacco Use    Smoking status: Never Smoker    Smokeless tobacco: Never Used    Tobacco comment: cigars, weekly   Vaping Use    Vaping Use: Never used   Substance and Sexual Activity    Alcohol use:  Yes     Alcohol/week: 0.0 standard drinks     Comment: rare    Drug use: No    Sexual activity: Not Currently   Other Topics Concern    None   Social History Narrative    None     Social Determinants of Health     Financial Resource Strain: Low Risk     Difficulty of Paying Living Expenses: Not hard at all   Food Insecurity: No Food Insecurity    Worried About [address] [employer] in the Last Year: Never true    Zaynab of [employer] in the Last Year: Never true   Transportation Needs: No Transportation Needs    Lack of Transportation (Medical): No    Lack of Transportation (Non-Medical): No   Physical Activity:     Days of Exercise per Week:     Minutes of Exercise per Session:    Stress:     Feeling of Stress :    Social Connections:     Frequency of Communication with Friends and Family:     Frequency of Social Gatherings with Friends and Family:     Attends Zoroastrian Services:     Active Member of Clubs or Organizations:     Attends Club or Organization Meetings:     Marital Status:    Intimate Partner Violence:     Fear of Current or Ex-Partner:     Emotionally Abused:     Physically Abused:     Sexually Abused:      No Known Allergies    Review of Systems   Constitutional: Negative for fatigue and fever. HENT: Negative for trouble swallowing and voice change. Eyes: Negative for photophobia and visual disturbance. Respiratory: Negative for choking and shortness of breath. Cardiovascular: Negative for chest pain and palpitations. Gastrointestinal: Negative for nausea and vomiting. Genitourinary: Negative for decreased urine volume and urgency. Musculoskeletal: Positive for arthralgias. Negative for back pain. Skin: Negative for rash. Neurological: Negative for tremors and syncope. Hematological: Does not bruise/bleed easily. Psychiatric/Behavioral: Negative for suicidal ideas. Vitals:    07/19/21 1509 07/19/21 1512   BP: (!) 146/74 (!) 152/92   Pulse: 74    SpO2: 91%    Weight: (!) 351 lb 3.2 oz (159.3 kg)    Height: 6' 1\" (1.854 m)        Physical Exam  Constitutional:       Appearance: He is well-developed. HENT:      Head: Normocephalic and atraumatic. Eyes:      Conjunctiva/sclera: Conjunctivae normal.      Pupils: Pupils are equal, round, and reactive to light. Cardiovascular:      Rate and Rhythm: Normal rate and regular rhythm.    Pulmonary:      Effort: Pulmonary effort is normal. No respiratory distress. Breath sounds: No wheezing. Abdominal:      General: Bowel sounds are normal.      Palpations: Abdomen is soft. Musculoskeletal:         General: Normal range of motion. Cervical back: Normal range of motion. Skin:     General: Skin is dry. Neurological:      Mental Status: He is alert. to consider ct, mri right hip. Assessment/Plan  Randall Joseph was seen today for hip pain. Diagnoses and all orders for this visit:    Hip pain  -     XR HIP RIGHT (2-3 VIEWS); Future  -     HYDROcodone-acetaminophen (NORCO) 5-325 MG per tablet; Take 1 tablet by mouth every 6 hours as needed for Pain for up to 7 days. Intended supply: 7 days. Take lowest dose possible to manage pain  -     External Referral - Wilson Bell MD - Arthroscopic Shoulder Surgery, Sports Med      He will see dr Belén Colvin For left knee. No follow-ups on file.     Linda Thomas MD

## 2021-07-20 ENCOUNTER — TELEPHONE (OUTPATIENT)
Dept: PRIMARY CARE CLINIC | Age: 71
End: 2021-07-20

## 2021-08-23 ENCOUNTER — OFFICE VISIT (OUTPATIENT)
Dept: PRIMARY CARE CLINIC | Age: 71
End: 2021-08-23
Payer: MEDICARE

## 2021-08-23 VITALS
DIASTOLIC BLOOD PRESSURE: 80 MMHG | WEIGHT: 315 LBS | BODY MASS INDEX: 41.75 KG/M2 | HEIGHT: 73 IN | OXYGEN SATURATION: 98 % | TEMPERATURE: 98.2 F | HEART RATE: 66 BPM | SYSTOLIC BLOOD PRESSURE: 154 MMHG

## 2021-08-23 VITALS
OXYGEN SATURATION: 98 % | DIASTOLIC BLOOD PRESSURE: 80 MMHG | SYSTOLIC BLOOD PRESSURE: 152 MMHG | WEIGHT: 315 LBS | HEIGHT: 73 IN | BODY MASS INDEX: 41.75 KG/M2 | HEART RATE: 66 BPM | TEMPERATURE: 98.2 F

## 2021-08-23 DIAGNOSIS — M25.562 CHRONIC PAIN OF LEFT KNEE: Primary | ICD-10-CM

## 2021-08-23 DIAGNOSIS — G89.29 CHRONIC PAIN OF LEFT KNEE: Primary | ICD-10-CM

## 2021-08-23 DIAGNOSIS — Z01.818 PRE-OP EXAM: ICD-10-CM

## 2021-08-23 DIAGNOSIS — Z00.00 ROUTINE GENERAL MEDICAL EXAMINATION AT A HEALTH CARE FACILITY: Primary | ICD-10-CM

## 2021-08-23 PROCEDURE — G0438 PPPS, INITIAL VISIT: HCPCS | Performed by: NURSE PRACTITIONER

## 2021-08-23 PROCEDURE — 99213 OFFICE O/P EST LOW 20 MIN: CPT | Performed by: INTERNAL MEDICINE

## 2021-08-23 ASSESSMENT — LIFESTYLE VARIABLES
HAS A RELATIVE, FRIEND, DOCTOR, OR ANOTHER HEALTH PROFESSIONAL EXPRESSED CONCERN ABOUT YOUR DRINKING OR SUGGESTED YOU CUT DOWN: 0
HAVE YOU OR SOMEONE ELSE BEEN INJURED AS A RESULT OF YOUR DRINKING: 0
HOW OFTEN DURING THE LAST YEAR HAVE YOU FAILED TO DO WHAT WAS NORMALLY EXPECTED FROM YOU BECAUSE OF DRINKING: 0
AUDIT TOTAL SCORE: 2
HOW MANY STANDARD DRINKS CONTAINING ALCOHOL DO YOU HAVE ON A TYPICAL DAY: 0
HOW OFTEN DURING THE LAST YEAR HAVE YOU HAD A FEELING OF GUILT OR REMORSE AFTER DRINKING: 0
HOW OFTEN DO YOU HAVE SIX OR MORE DRINKS ON ONE OCCASION: 0
HOW OFTEN DURING THE LAST YEAR HAVE YOU NEEDED AN ALCOHOLIC DRINK FIRST THING IN THE MORNING TO GET YOURSELF GOING AFTER A NIGHT OF HEAVY DRINKING: 0
AUDIT-C TOTAL SCORE: 2
HOW OFTEN DO YOU HAVE A DRINK CONTAINING ALCOHOL: 2
HOW OFTEN DURING THE LAST YEAR HAVE YOU BEEN UNABLE TO REMEMBER WHAT HAPPENED THE NIGHT BEFORE BECAUSE YOU HAD BEEN DRINKING: 0
HOW OFTEN DURING THE LAST YEAR HAVE YOU FOUND THAT YOU WERE NOT ABLE TO STOP DRINKING ONCE YOU HAD STARTED: 0

## 2021-08-23 ASSESSMENT — PATIENT HEALTH QUESTIONNAIRE - PHQ9
1. LITTLE INTEREST OR PLEASURE IN DOING THINGS: 0
SUM OF ALL RESPONSES TO PHQ QUESTIONS 1-9: 0
SUM OF ALL RESPONSES TO PHQ9 QUESTIONS 1 & 2: 0
SUM OF ALL RESPONSES TO PHQ QUESTIONS 1-9: 0
SUM OF ALL RESPONSES TO PHQ QUESTIONS 1-9: 0
2. FEELING DOWN, DEPRESSED OR HOPELESS: 0

## 2021-08-23 ASSESSMENT — ENCOUNTER SYMPTOMS
VOICE CHANGE: 0
NAUSEA: 0
CHOKING: 0
PHOTOPHOBIA: 0
TROUBLE SWALLOWING: 0
VOMITING: 0
SHORTNESS OF BREATH: 0

## 2021-08-23 NOTE — PROGRESS NOTES
kg)     Vitals:    08/23/21 1417 08/23/21 1424   BP: (!) 160/82 (!) 154/80   Site: Left Upper Arm    Cuff Size: Large Adult    Pulse: 66    Temp: 98.2 °F (36.8 °C)    SpO2: 98%    Weight: (!) 350 lb (158.8 kg)    Height: 6' 1\" (1.854 m)      Body mass index is 46.18 kg/m². Based upon direct observation of the patient, evaluation of cognition reveals recent and remote memory intact. Eyes: pupils equal, round, and reactive to light, extraocular eye movements intact, conjunctivae normal  ENT: tympanic membrane, external ear and ear canal normal bilaterally, nose without deformity, nasal mucosa and turbinates normal without polyps  Neck: supple and non-tender without mass, no thyromegaly or thyroid nodules, no cervical lymphadenopathy  Cardiovascular: normal rate, regular rhythm, normal S1 and S2, no murmurs, rubs, clicks, or gallops, distal pulses intact, no carotid bruits  Abdomen: soft, non-tender, non-distended, normal bowel sounds, no masses or organomegaly  Genitourinary/Rectal: genitals normal without hernia or inguinal adenopathy, rectal normal without masses, prostate normal in size without masses or nodules  Extremities: no cyanosis, clubbing or edema  Musculoskeletal: normal range of motion, no joint swelling, deformity or tenderness    Patient's complete Health Risk Assessment and screening values have been reviewed and are found in Flowsheets. The following problems were reviewed today and where indicated follow up appointments were made and/or referrals ordered. Positive Risk Factor Screenings with Interventions:            General Health and ACP:  General  In general, how would you say your health is?: Good  In the past 7 days, have you experienced any of the following?  New or Increased Pain, New or Increased Fatigue, Loneliness, Social Isolation, Stress or Anger?: (!) New or Increased Pain (hip and knee, rt hip)  Do you get the social and emotional support that you need?: Yes  Do you have a Living Will?: (!) No  Advance Directives     Power of  Living Will ACP-Advance Directive ACP-Power of     Not on File Not on File Not on File Not on File      General Health Risk Interventions:  · Pain issues: home exercises provided, patient declines any further evaluation/treatment for this issue, pt here with knee and hip pain but pt getting cleared for his knee surgery  · No Living Will: Advance Care Planning addressed with patient today and Patient declines ACP discussion/assistance    Health Habits/Nutrition:  Health Habits/Nutrition  Do you exercise for at least 20 minutes 2-3 times per week?: (!) No  Have you lost any weight without trying in the past 3 months?: No  Do you eat only one meal per day?: No  Have you seen the dentist within the past year?: (!) No  Body mass index: (!) 46.17  Health Habits/Nutrition Interventions:  · Inadequate physical activity:  educational materials provided to promote increased physical activity, patient is not ready to increase his/her physical activity level at this time  · Dental exam overdue:  patient encouraged to make appointment with his/her dentist     Safety:  Safety  Do you have working smoke detectors?: Yes  Have all throw rugs been removed or fastened?: Yes  Do you have non-slip mats or surfaces in all bathtubs/showers?: (!) No  Do all of your stairways have a railing or banister?: Yes  Are your doorways, halls and stairs free of clutter?: Yes  Do you always fasten your seatbelt when you are in a car?: Yes  Safety Interventions:  · Home safety tips provided  · Patient declines any further evaluation/treatment for this issue     Personalized Preventive Plan   Current Health Maintenance Status  Immunization History   Administered Date(s) Administered    COVID-19, Moderna, PF, 100mcg/0.5mL 02/12/2021, 03/12/2021    Influenza, High-dose, Quadv, 65 yrs +, IM (Fluzone) 10/16/2020    Pneumococcal Polysaccharide ([id number]) 12/07/2020        Health Maintenance   Topic Date Due    Hepatitis C screen  Never done    DTaP/Tdap/Td vaccine (1 - Tdap) Never done    Shingles Vaccine (1 of 2) Never done    Colon cancer screen colonoscopy  01/01/2013    Annual Wellness Visit (AWV)  06/20/2021    Flu vaccine (1) 09/01/2021    Potassium monitoring  08/17/2022    Creatinine monitoring  08/17/2022    Lipid screen  06/22/2026    Pneumococcal 65+ years Vaccine  Completed    COVID-19 Vaccine  Completed    Hepatitis A vaccine  Aged Out    Hepatitis B vaccine  Aged Out    Hib vaccine  Aged Out    Meningococcal (ACWY) vaccine  Aged Out     Recommendations for [place] Due: see orders and patient instructions/AVS.  . Recommended screening schedule for the next 5-10 years is provided to the patient in written form: see Patient Bj Jamison was seen today for medicare awv.     Diagnoses and all orders for this visit:    Routine general medical examination at a health care facility

## 2021-08-23 NOTE — PROGRESS NOTES
Theo Mccray 73 y.o. male presents today with   Chief Complaint   Patient presents with   Karime Schlichter Exam     Dr Divina Dexter TKR left 8/31       Knee Pain   Incident onset: years. The incident occurred at home. There was no injury mechanism. The pain is present in the right knee. The pain is at a severity of 6/10. The pain is severe. The pain has been worsening since onset. Past Medical History:   Diagnosis Date    GERD (gastroesophageal reflux disease)     Hyperlipidemia     Hypertension      Patient Active Problem List    Diagnosis Date Noted    Pain in left knee 03/11/2019    Unilateral primary osteoarthritis, left knee 03/11/2019    Unilateral primary osteoarthritis, left knee 03/11/2019    Acute bronchitis 01/17/2017     Past Surgical History:   Procedure Laterality Date    CARPAL TUNNEL RELEASE      COLONOSCOPY      VASECTOMY       Family History   Problem Relation Age of Onset    Cancer Mother         uterine, breast, skin    Stroke Brother      Social History     Socioeconomic History    Marital status:      Spouse name: None    Number of children: None    Years of education: None    Highest education level: None   Occupational History    None   Tobacco Use    Smoking status: Never Smoker    Smokeless tobacco: Never Used    Tobacco comment: cigars, weekly   Vaping Use    Vaping Use: Never used   Substance and Sexual Activity    Alcohol use:  Yes     Alcohol/week: 0.0 standard drinks     Comment: rare    Drug use: No    Sexual activity: Not Currently   Other Topics Concern    None   Social History Narrative    None     Social Determinants of Health     Financial Resource Strain: Low Risk     Difficulty of Paying Living Expenses: Not hard at all   Food Insecurity: No Food Insecurity    Worried About Running Out of Food in the Last Year: Never true    Zaynab of [employer] in the Last Year: Never true   Transportation Needs: No Transportation Needs    Lack of Transportation (Medical): No    Lack of Transportation (Non-Medical): No   Physical Activity:     Days of Exercise per Week:     Minutes of Exercise per Session:    Stress:     Feeling of Stress :    Social Connections:     Frequency of Communication with Friends and Family:     Frequency of Social Gatherings with Friends and Family:     Attends Jainism Services:     Active Member of Clubs or Organizations:     Attends Club or Organization Meetings:     Marital Status:    Intimate Partner Violence:     Fear of Current or Ex-Partner:     Emotionally Abused:     Physically Abused:     Sexually Abused:      No Known Allergies    Review of Systems   Constitutional: Negative for fatigue and fever. HENT: Negative for trouble swallowing and voice change. Eyes: Negative for photophobia and visual disturbance. Respiratory: Negative for choking and shortness of breath. Cardiovascular: Negative for chest pain and palpitations. Gastrointestinal: Negative for nausea and vomiting. Genitourinary: Negative for decreased urine volume, testicular pain and urgency. Musculoskeletal: Positive for arthralgias. Skin: Negative for rash. Neurological: Negative for tremors and syncope. Hematological: Does not bruise/bleed easily. Psychiatric/Behavioral: Negative for suicidal ideas. Vitals:    08/23/21 1417 08/23/21 1425   BP: (!) 160/92 (!) 152/80   Site: Left Upper Arm Left Upper Arm   Cuff Size: Large Adult Large Adult   Pulse: 66    Temp: 98.2 °F (36.8 °C)    SpO2: 98%    Weight: (!) 350 lb (158.8 kg)    Height: 6' 1\" (1.854 m)        Physical Exam  Constitutional:       Appearance: He is well-developed. HENT:      Head: Normocephalic and atraumatic. Eyes:      Conjunctiva/sclera: Conjunctivae normal.      Pupils: Pupils are equal, round, and reactive to light. Cardiovascular:      Rate and Rhythm: Normal rate and regular rhythm.    Pulmonary:      Effort: Pulmonary effort is normal. No respiratory distress. Breath sounds: No wheezing or rales. Abdominal:      General: Bowel sounds are normal. There is no distension. Musculoskeletal:         General: Normal range of motion. Cervical back: Normal range of motion. Skin:     General: Skin is dry. Coloration: Skin is not jaundiced. Neurological:      Mental Status: He is alert. Cranial Nerves: No cranial nerve deficit. Psychiatric:         Mood and Affect: Mood normal.       Hold NSAIDs  Assessment/Plan  Jayshree Powers was seen today for pre-op exam.    Diagnoses and all orders for this visit:    Chronic pain of left knee    Pre-op exam    medical cleared for surgery    No follow-ups on file.     Gwen Bess MD

## 2021-08-23 NOTE — PATIENT INSTRUCTIONS
Personalized Preventive Plan for Deepti Willingham - 8/23/2021  Medicare offers a range of preventive health benefits. Some of the tests and screenings are paid in full while other may be subject to a deductible, co-insurance, and/or copay. Some of these benefits include a comprehensive review of your medical history including lifestyle, illnesses that may run in your family, and various assessments and screenings as appropriate. After reviewing your medical record and screening and assessments performed today your provider may have ordered immunizations, labs, imaging, and/or referrals for you. A list of these orders (if applicable) as well as your Preventive Care list are included within your After Visit Summary for your review. Other Preventive Recommendations:    · A preventive eye exam performed by an eye specialist is recommended every 1-2 years to screen for glaucoma; cataracts, macular degeneration, and other eye disorders. · A preventive dental visit is recommended every 6 months. · Try to get at least 150 minutes of exercise per week or 10,000 steps per day on a pedometer . · Order or download the FREE \"Exercise & Physical Activity: Your Everyday Guide\" from [employer]. Call 5-761.109.4241 or search [employer] online. · You need 7849-9555 mg of calcium and 9398-0231 IU of vitamin D per day. It is possible to meet your calcium requirement with diet alone, but a vitamin D supplement is usually necessary to meet this goal.  · When exposed to the sun, use a sunscreen that protects against both UVA and UVB radiation with an SPF of 30 or greater. Reapply every 2 to 3 hours or after sweating, drying off with a towel, or swimming. · Always wear a seat belt when traveling in a car. Always wear a helmet when riding a bicycle or motorcycle. Patient Education        Well Visit, Over 72: Care Instructions  Overview     Well visits can help you stay healthy.  Your doctor has checked your overall health and may have suggested ways to take good care of yourself. Your doctor also may have recommended tests. At home, you can help prevent illness with healthy eating, regular exercise, and other steps. Follow-up care is a key part of your treatment and safety. Be sure to make and go to all appointments, and call your doctor if you are having problems. It's also a good idea to know your test results and keep a list of the medicines you take. How can you care for yourself at home? Get screening tests that you and your doctor decide on. Screening helps find diseases before any symptoms appear. Eat healthy foods. Choose fruits, vegetables, whole grains, protein, and low-fat dairy foods. Limit fat, especially saturated fat. Reduce salt in your diet. Limit alcohol. If you are a man, have no more than 2 drinks a day or 14 drinks a week. If you are a woman, have no more than 1 drink a day or 7 drinks a week. Since alcohol affects older adults differently, you may want to limit alcohol even more. Or you may not want to drink at all. Get at least 30 minutes of exercise on most days of the week. Walking is a good choice. You also may want to do other activities, such as running, swimming, cycling, or playing tennis or team sports. Reach and stay at a healthy weight. This will lower your risk for many problems, such as obesity, diabetes, heart disease, and high blood pressure. Do not smoke. Smoking can make health problems worse. If you need help quitting, talk to your doctor about stop-smoking programs and medicines. These can increase your chances of quitting for good. Care for your mental health. It is easy to get weighed down by worry and stress. Learn strategies to manage stress, like deep breathing and mindfulness, and stay connected with your family and community. If you find you often feel sad or hopeless, talk with your doctor. Treatment can help.   Talk to your doctor about whether you have any risk factors for sexually transmitted infections (STIs). You can help prevent STIs if you wait to have sex with a new partner (or partners) until you've each been tested for STIs. It also helps if you use condoms (male or female condoms) and if you limit your sex partners to one person who only has sex with you. Vaccines are available for some STIs. If you think you may have a problem with alcohol or drug use, talk to your doctor. This includes prescription medicines (such as amphetamines and opioids) and illegal drugs (such as cocaine and methamphetamine). Your doctor can help you figure out what type of treatment is best for you. Protect your skin from too much sun. When you're outdoors from 10 a.m. to 4 p.m., stay in the shade or cover up with clothing and a hat with a wide brim. Wear sunglasses that block UV rays. Even when it's cloudy, put broad-spectrum sunscreen (SPF 30 or higher) on any exposed skin. See a dentist one or two times a year for checkups and to have your teeth cleaned. Wear a seat belt in the car. When should you call for help? Watch closely for changes in your health, and be sure to contact your doctor if you have any problems or symptoms that concern you. Where can you learn more? Go to https://[email or web address] org and sign in to your [employer] account. Enter O135 in the [facility] box to learn more about \"Well Visit, Over 65: Care Instructions. \"     If you do not have an account, please click on the \"Sign Up Now\" link. Current as of: May 27, 2020               Content Version: 12.9  © 0307-6099 [employer]. Care instructions adapted under license by [facility] ([facility]). If you have questions about a medical condition or this instruction, always ask your healthcare professional. [address] any warranty or liability for your use of this information.

## 2021-10-05 ENCOUNTER — TELEPHONE (OUTPATIENT)
Dept: PRIMARY CARE CLINIC | Age: 71
End: 2021-10-05

## 2021-10-08 ENCOUNTER — IMMUNIZATION (OUTPATIENT)
Dept: PRIMARY CARE CLINIC | Age: 71
End: 2021-10-08
Payer: MEDICARE

## 2021-10-08 DIAGNOSIS — Z23 NEED FOR INFLUENZA VACCINATION: Primary | ICD-10-CM

## 2021-10-08 PROCEDURE — G0008 ADMIN INFLUENZA VIRUS VAC: HCPCS | Performed by: INTERNAL MEDICINE

## 2021-10-08 PROCEDURE — 90694 VACC AIIV4 NO PRSRV 0.5ML IM: CPT | Performed by: INTERNAL MEDICINE

## 2022-04-04 ENCOUNTER — OFFICE VISIT (OUTPATIENT)
Dept: PRIMARY CARE CLINIC | Age: 72
End: 2022-04-04
Payer: MEDICARE

## 2022-04-04 VITALS
SYSTOLIC BLOOD PRESSURE: 122 MMHG | OXYGEN SATURATION: 99 % | DIASTOLIC BLOOD PRESSURE: 74 MMHG | BODY MASS INDEX: 42.66 KG/M2 | TEMPERATURE: 97.7 F | HEIGHT: 72 IN | WEIGHT: 315 LBS | HEART RATE: 54 BPM

## 2022-04-04 DIAGNOSIS — R00.1 BRADYCARDIA: ICD-10-CM

## 2022-04-04 DIAGNOSIS — I10 ESSENTIAL HYPERTENSION: ICD-10-CM

## 2022-04-04 DIAGNOSIS — E03.9 HYPOTHYROIDISM, UNSPECIFIED TYPE: ICD-10-CM

## 2022-04-04 DIAGNOSIS — R06.02 SOB (SHORTNESS OF BREATH): ICD-10-CM

## 2022-04-04 DIAGNOSIS — R73.9 HYPERGLYCEMIA: ICD-10-CM

## 2022-04-04 DIAGNOSIS — R00.1 BRADYCARDIA: Primary | ICD-10-CM

## 2022-04-04 LAB
ALBUMIN SERPL-MCNC: 4.1 G/DL (ref 3.5–4.6)
ALP BLD-CCNC: 52 U/L (ref 35–104)
ALT SERPL-CCNC: 22 U/L (ref 0–41)
ANION GAP SERPL CALCULATED.3IONS-SCNC: 14 MEQ/L (ref 9–15)
AST SERPL-CCNC: 16 U/L (ref 0–40)
BASOPHILS ABSOLUTE: 0.3 K/UL (ref 0–0.2)
BASOPHILS RELATIVE PERCENT: 3 %
BILIRUB SERPL-MCNC: 0.5 MG/DL (ref 0.2–0.7)
BUN BLDV-MCNC: 18 MG/DL (ref 8–23)
CALCIUM SERPL-MCNC: 9.2 MG/DL (ref 8.5–9.9)
CHLORIDE BLD-SCNC: 101 MEQ/L (ref 95–107)
CO2: 25 MEQ/L (ref 20–31)
CREAT SERPL-MCNC: 1.21 MG/DL (ref 0.7–1.2)
EOSINOPHILS ABSOLUTE: 0.5 K/UL (ref 0–0.7)
EOSINOPHILS RELATIVE PERCENT: 5 %
GFR AFRICAN AMERICAN: >60
GFR NON-AFRICAN AMERICAN: 59.2
GLOBULIN: 3.1 G/DL (ref 2.3–3.5)
GLUCOSE BLD-MCNC: 96 MG/DL (ref 70–99)
HBA1C MFR BLD: 5.7 % (ref 4.8–5.9)
HCT VFR BLD CALC: 45.4 % (ref 42–52)
HEMOGLOBIN: 14.8 G/DL (ref 14–18)
LYMPHOCYTES ABSOLUTE: 1.7 K/UL (ref 1–4.8)
LYMPHOCYTES RELATIVE PERCENT: 16 %
MCH RBC QN AUTO: 27.8 PG (ref 27–31.3)
MCHC RBC AUTO-ENTMCNC: 32.7 % (ref 33–37)
MCV RBC AUTO: 85.2 FL (ref 80–100)
MONOCYTES ABSOLUTE: 0.5 K/UL (ref 0.2–0.8)
MONOCYTES RELATIVE PERCENT: 4.7 %
NEUTROPHILS ABSOLUTE: 7.5 K/UL (ref 1.4–6.5)
NEUTROPHILS RELATIVE PERCENT: 72 %
PDW BLD-RTO: 14.6 % (ref 11.5–14.5)
PLATELET # BLD: 216 K/UL (ref 130–400)
PLATELET SLIDE REVIEW: ADEQUATE
POTASSIUM SERPL-SCNC: 4 MEQ/L (ref 3.4–4.9)
RBC # BLD: 5.33 M/UL (ref 4.7–6.1)
RBC # BLD: NORMAL 10*6/UL
SMUDGE CELLS: 5.7
SODIUM BLD-SCNC: 140 MEQ/L (ref 135–144)
TOTAL PROTEIN: 7.2 G/DL (ref 6.3–8)
TSH REFLEX: 2.5 UIU/ML (ref 0.44–3.86)
WBC # BLD: 10.4 K/UL (ref 4.8–10.8)

## 2022-04-04 PROCEDURE — 99214 OFFICE O/P EST MOD 30 MIN: CPT | Performed by: INTERNAL MEDICINE

## 2022-04-04 PROCEDURE — 93000 ELECTROCARDIOGRAM COMPLETE: CPT | Performed by: INTERNAL MEDICINE

## 2022-04-04 RX ORDER — CYCLOBENZAPRINE HCL 10 MG
TABLET ORAL
COMMUNITY
Start: 2021-09-13 | End: 2022-05-18 | Stop reason: SDUPTHER

## 2022-04-04 ASSESSMENT — ENCOUNTER SYMPTOMS
NAUSEA: 0
VOICE CHANGE: 0
BACK PAIN: 1
CHOKING: 0
ABDOMINAL PAIN: 0
SHORTNESS OF BREATH: 1
TROUBLE SWALLOWING: 0
PHOTOPHOBIA: 0
VOMITING: 0

## 2022-04-04 NOTE — PROGRESS NOTES
Blank Gurrola 81 y.o. male presents today with   Chief Complaint   Patient presents with    6 Month Follow-Up    Shortness of Breath     onset x 2 week, no chest pain. Standing and walking, the SOB doesn't last long. He will catch his breath within 30 seconds.  Dizziness     onset 2 weeks ago, when getting up walking around. Doesn't feel like he is going to pass out when he stands up. Drinking fluids,     Other     Bp was 120/70, good but has High BP in the past        Shortness of Breath  This is a recurrent (with light headed) problem. Episode onset: 2 weeks ago. The problem has been waxing and waning. Pertinent negatives include no abdominal pain, chest pain, fever, rash or vomiting. The symptoms are aggravated by any activity. Dizziness  Associated symptoms include arthralgias. Pertinent negatives include no abdominal pain, chest pain, fatigue, fever, nausea, rash or vomiting. Other  Associated symptoms include arthralgias. Pertinent negatives include no abdominal pain, chest pain, fatigue, fever, nausea, rash or vomiting.        Past Medical History:   Diagnosis Date    GERD (gastroesophageal reflux disease)     Hyperlipidemia     Hypertension      Patient Active Problem List    Diagnosis Date Noted    Pain in left knee 03/11/2019    Unilateral primary osteoarthritis, left knee 03/11/2019    Unilateral primary osteoarthritis, left knee 03/11/2019    Acute bronchitis 01/17/2017     Past Surgical History:   Procedure Laterality Date    CARPAL TUNNEL RELEASE      COLONOSCOPY      VASECTOMY       Family History   Problem Relation Age of Onset    Cancer Mother         uterine, breast, skin    Stroke Brother      Social History     Socioeconomic History    Marital status:      Spouse name: Not on file    Number of children: Not on file    Years of education: Not on file    Highest education level: Not on file   Occupational History    Not on file   Tobacco Use    Smoking status: Never Smoker    Smokeless tobacco: Never Used    Tobacco comment: cigars, weekly   Vaping Use    Vaping Use: Never used   Substance and Sexual Activity    Alcohol use: Yes     Alcohol/week: 0.0 standard drinks     Comment: rare    Drug use: No    Sexual activity: Not Currently   Other Topics Concern    Not on file   Social History Narrative    Not on file     Social Determinants of Health     Financial Resource Strain: Low Risk     Difficulty of Paying Living Expenses: Not hard at all   Food Insecurity: No Food Insecurity    Worried About Running Out of Food in the Last Year: Never true    Zaynab of Food in the Last Year: Never true   Transportation Needs: No Transportation Needs    Lack of Transportation (Medical): No    Lack of Transportation (Non-Medical): No   Physical Activity:     Days of Exercise per Week: Not on file    Minutes of Exercise per Session: Not on file   Stress:     Feeling of Stress : Not on file   Social Connections:     Frequency of Communication with Friends and Family: Not on file    Frequency of Social Gatherings with Friends and Family: Not on file    Attends Druze Services: Not on file    Active Member of [address] [place] [address] or Organizations: Not on file    Attends Club or Organization Meetings: Not on file    Marital Status: Not on file   Intimate Partner Violence:     Fear of Current or Ex-Partner: Not on file    Emotionally Abused: Not on file    Physically Abused: Not on file    Sexually Abused: Not on file   Housing Stability:     Unable to Pay for Housing in the Last Year: Not on file    Number of [place] in the Last Year: Not on file    Unstable Housing in the Last Year: Not on file     No Known Allergies    Review of Systems   Constitutional: Negative for fatigue and fever. HENT: Negative for trouble swallowing and voice change. Eyes: Negative for photophobia and visual disturbance. Respiratory: Positive for shortness of breath.  Negative for choking. Cardiovascular: Negative for chest pain and palpitations. Gastrointestinal: Negative for abdominal pain, nausea and vomiting. Genitourinary: Negative for decreased urine volume and urgency. Musculoskeletal: Positive for arthralgias and back pain. Skin: Negative for rash. Neurological: Positive for dizziness and light-headedness. Negative for tremors and syncope. Hematological: Does not bruise/bleed easily. Psychiatric/Behavioral: Positive for agitation. Negative for suicidal ideas. Vitals:    04/04/22 1315   BP: 122/74   Site: Right Upper Arm   Position: Sitting   Cuff Size: Large Adult   Pulse: 54   Temp: 97.7 °F (36.5 °C)   SpO2: 99%   Weight: (!) 352 lb (159.7 kg)   Height: 6' (1.829 m)       Physical Exam  Constitutional:       Appearance: He is well-developed. HENT:      Head: Normocephalic and atraumatic. Eyes:      Conjunctiva/sclera: Conjunctivae normal.   Cardiovascular:      Rate and Rhythm: Normal rate and regular rhythm. Pulmonary:      Effort: Pulmonary effort is normal. No respiratory distress. Abdominal:      General: Bowel sounds are normal. There is no distension. Musculoskeletal:         General: Normal range of motion. Cervical back: Normal range of motion. Skin:     General: Skin is dry. Coloration: Skin is not jaundiced. Neurological:      Mental Status: He is alert. Cranial Nerves: No cranial nerve deficit. Psychiatric:         Mood and Affect: Mood normal.       Assessment/Plan  Jessica Celis was seen today for 6 month follow-up, shortness of breath, dizziness and other. Diagnoses and all orders for this visit:    Bradycardia  -     Comprehensive Metabolic Panel; Future  -     CBC with Auto Differential; Future  -     TSH with Reflex; Future   to decrease from 100mg a day to 50 mg  Essential hypertension  -     Comprehensive Metabolic Panel;  Future  -     CBC with Auto Differential; Future    Hyperglycemia  -     Comprehensive Metabolic Panel; Future  -     Hemoglobin A1C; Future    Hypothyroidism, unspecified type  -     TSH with Reflex; Future    Body mass index (BMI) 45.0-49.9, adult (HCC)    2 lb gain      Return in about 3 months (around 7/4/2022), or if symptoms worsen or fail to improve.     Jesse Soto MD

## 2022-04-18 ENCOUNTER — TELEPHONE (OUTPATIENT)
Dept: PRIMARY CARE CLINIC | Age: 72
End: 2022-04-18

## 2022-04-18 NOTE — TELEPHONE ENCOUNTER
----- Message from Lashay Espionsa sent at 4/18/2022 11:44 AM EDT -----  Subject: Message to Provider    QUESTIONS  Information for Provider? Pt blood pressure was low 122/80 when he saw Dr. Manuel Young III on 4/4/2022), Dr told pt to cut RX atenolol (TENORMIN) 50 MG   tablet) in half (taking 1 pill instead of 2 pill), Had a EKG on same day. As of Saturday blood pressure is 162/112 thru [address]  ---------------------------------------------------------------------------  --------------  [address]  What is the best way for the office to contact you? OK to leave message on   voicemail  Preferred Call Back Phone Number? [phone]  ---------------------------------------------------------------------------  --------------  SCRIPT ANSWERS  Relationship to Patient?  Self

## 2022-05-05 LAB
LEFT VENTRICULAR EJECTION FRACTION MODE: NORMAL
LV EF: 65 %

## 2022-05-18 ENCOUNTER — OFFICE VISIT (OUTPATIENT)
Dept: PRIMARY CARE CLINIC | Age: 72
End: 2022-05-18
Payer: MEDICARE

## 2022-05-18 VITALS
RESPIRATION RATE: 18 BRPM | DIASTOLIC BLOOD PRESSURE: 72 MMHG | HEIGHT: 72 IN | SYSTOLIC BLOOD PRESSURE: 136 MMHG | WEIGHT: 315 LBS | HEART RATE: 71 BPM | BODY MASS INDEX: 42.66 KG/M2 | TEMPERATURE: 96.5 F | OXYGEN SATURATION: 95 %

## 2022-05-18 DIAGNOSIS — M25.559 HIP PAIN: ICD-10-CM

## 2022-05-18 DIAGNOSIS — R00.1 BRADYCARDIA: Primary | ICD-10-CM

## 2022-05-18 DIAGNOSIS — Z09 HOSPITAL DISCHARGE FOLLOW-UP: ICD-10-CM

## 2022-05-18 PROCEDURE — 1111F DSCHRG MED/CURRENT MED MERGE: CPT | Performed by: INTERNAL MEDICINE

## 2022-05-18 PROCEDURE — 99215 OFFICE O/P EST HI 40 MIN: CPT | Performed by: INTERNAL MEDICINE

## 2022-05-18 RX ORDER — CYCLOBENZAPRINE HCL 10 MG
10 TABLET ORAL 2 TIMES DAILY PRN
Qty: 20 TABLET | Refills: 0 | Status: SHIPPED | OUTPATIENT
Start: 2022-05-18

## 2022-05-18 RX ORDER — HYDROCHLOROTHIAZIDE 25 MG/1
TABLET ORAL
COMMUNITY
Start: 2022-05-05 | End: 2022-06-27 | Stop reason: SDUPTHER

## 2022-05-18 NOTE — PROGRESS NOTES
Post-Discharge Transitional Care Management Progress Note  Sung Fall   YOB: 1950    Date of Office Visit:  5/18/2022  Date of Hospital Admission:   Date of Hospital Discharge:   Care management risk score Rising risk (score 2-5) and Complex Care (Scores >=6): 1   Non face to face  following discharge, date last encounter closed (first attempt may have been earlier): *No documented post hospital discharge outreach found in the last 14 days *No documented post hospital discharge outreach found in the last 14 days  Call initiated 2 business days of discharge: *No response recorded in the last 14 days    ASSESSMENT/PLAN:   Bradycardia  Comments:  resolved. Will follow with cardio. Atenolol [facility] discharge follow-up  -     SC DISCHARGE MEDS RECONCILED W/ CURRENT OUTPATIENT MED LIST  Hip pain  Comments:  wants flexeril refill   Orders:  -     cyclobenzaprine (FLEXERIL) 10 MG tablet; Take 1 tablet by mouth 2 times daily as needed for Muscle spasms, Disp-20 tablet, R-0Normal      Medical Decision Making: moderate complexity  No follow-ups on file. On this date 5/18/2022 I have spent 20 minutes reviewing previous notes, test results and face to face with the patient discussing the diagnosis and importance of compliance with the treatment plan as well as documenting on the day of the visit. Subjective:   HPI:  Follow up of Hospital problems/diagnosis(es):     Pt presents for follow up 20 [facility] admission regarding symptomatic bradycardia. He had been on atenolol. But with gradual lightheadedness and HR dropping to 30s and 40s, work up showed NSR with 1st degree AV block with occasional PVC at 67beats per minute. 2D echo;he left ventricle is normal in size. There is moderate left ventricular hypertrophy. Left ventricular systolic function is normal. EF = 65 ± 5%. Aortic, pulmonic, tricuspid valve not well visualized. No significant valve functional abnormalities.     Since atenolol Dc HR normalized, symptoms resolved. Vitals 5/18/2022 4/4/2022 8/23/2021 8/23/2021 8/23/2021 8/23/2021   Pulse 71 54   66 66     Planned for sleep study by cardio. Inpatient course: Discharge summary reviewed- see chart. Interval history/Current status: stable   Patient Active Problem List   Diagnosis    Acute bronchitis    Pain in left knee    Unilateral primary osteoarthritis, left knee    Unilateral primary osteoarthritis, left knee       Medications listed as ordered at the time of discharge from hospital     Medication List          Accurate as of May 18, 2022  4:11 PM. If you have any questions, ask your nurse or doctor.             CHANGE how you take these medications    cyclobenzaprine 10 MG tablet  Commonly known as: FLEXERIL  Take 1 tablet by mouth 2 times daily as needed for Muscle spasms  What changed:   · how much to take  · when to take this  · reasons to take this  Changed by: Deon Schrader MD        CONTINUE taking these medications    amLODIPine 5 MG tablet  Commonly known as: NORVASC  Take 1 tablet by mouth daily     hydroCHLOROthiazide 25 MG tablet  Commonly known as: HYDRODIURIL     ibuprofen 800 MG tablet  Commonly known as: ADVIL;MOTRIN  TAKE 1 TABLET TWICE A DAY  AS NEEDED FOR PAIN     lisinopril 40 MG tablet  Commonly known as: PRINIVIL;ZESTRIL     omeprazole 20 MG delayed release capsule  Commonly known as: PriLOSEC  Take 1 capsule by mouth Daily        STOP taking these medications    atenolol 50 MG tablet  Commonly known as: TENORMIN  Stopped by: Deon Schrader MD     lisinopril-hydroCHLOROthiazide 20-12.5 MG per tablet  Commonly known as: PRINZIDE;ZESTORETIC  Stopped by: Deon Scharder MD           Where to Get Your Medications      These medications were sent to [address] [place] [address], [address] [place] [address]    Phone: [phone]   · cyclobenzaprine 10 MG tablet      Medications marked \"taking\" at this time  Outpatient Medications Marked as Taking for the 5/18/22 encounter (Office Visit) with Jhoana León MD   Medication Sig Dispense Refill    hydroCHLOROthiazide (HYDRODIURIL) 25 MG tablet Take 1 tablet by mouth once daily.  cyclobenzaprine (FLEXERIL) 10 MG tablet Take 1 tablet by mouth 2 times daily as needed for Muscle spasms 20 tablet 0    lisinopril (PRINIVIL;ZESTRIL) 40 MG tablet Take 40 mg by mouth daily      omeprazole (PRILOSEC) 20 MG delayed release capsule Take 1 capsule by mouth Daily 90 capsule 3    ibuprofen (ADVIL;MOTRIN) 800 MG tablet TAKE 1 TABLET TWICE A DAY  AS NEEDED FOR PAIN 180 tablet 3    amLODIPine (NORVASC) 5 MG tablet Take 1 tablet by mouth daily 90 tablet 3   Medications patient taking as of now reconciled against medications ordered at time of hospital discharge: Yes    A comprehensive review of systems was negative except for what was noted in the HPI.     Objective:    /72   Pulse 71   Temp 96.5 °F (35.8 °C)   Resp 18   Ht 6' (1.829 m)   Wt (!) 351 lb 12.8 oz (159.6 kg)   SpO2 95%   BMI 47.71 kg/m²   General Appearance: alert and oriented to person, place and time, well developed and well- nourished, in no acute distress  Skin: warm and dry, no rash or erythema  Head: normocephalic and atraumatic  Eyes: pupils equal, round, and reactive to light, extraocular eye movements intact, conjunctivae normal  ENT: tympanic membrane, external ear and ear canal normal bilaterally, nose without deformity, nasal mucosa and turbinates normal without polyps  Neck: supple and non-tender without mass, no thyromegaly or thyroid nodules, no cervical lymphadenopathy  Pulmonary/Chest: clear to auscultation bilaterally- no wheezes, rales or rhonchi, normal air movement, no respiratory distress  Cardiovascular: normal rate, regular rhythm, normal S1 and S2, no murmurs, rubs, clicks, or gallops, distal pulses intact, no carotid bruits  Abdomen: soft, non-tender, non-distended, normal bowel sounds, no masses or organomegaly  Extremities: no cyanosis, clubbing or edema  Musculoskeletal: normal range of motion, no joint swelling, deformity or tenderness  Neurologic: reflexes normal and symmetric, no cranial nerve deficit, gait, coordination and speech normal    An electronic signature was used to authenticate this note.   --Kassidy Martin MD

## 2022-05-19 ENCOUNTER — TELEPHONE (OUTPATIENT)
Dept: GASTROENTEROLOGY | Age: 72
End: 2022-05-19

## 2022-06-13 ENCOUNTER — COMMUNITY OUTREACH (OUTPATIENT)
Dept: PRIMARY CARE CLINIC | Age: 72
End: 2022-06-13

## 2022-06-13 NOTE — PROGRESS NOTES
Patient's HM shows they are overdue for Colonoscopy. [facility] and  files searched without success. Pt declines colonoscopy at this time.

## 2022-06-27 ENCOUNTER — OFFICE VISIT (OUTPATIENT)
Dept: PRIMARY CARE CLINIC | Age: 72
End: 2022-06-27
Payer: MEDICARE

## 2022-06-27 VITALS
HEART RATE: 79 BPM | HEIGHT: 72 IN | OXYGEN SATURATION: 97 % | DIASTOLIC BLOOD PRESSURE: 88 MMHG | SYSTOLIC BLOOD PRESSURE: 130 MMHG | WEIGHT: 315 LBS | TEMPERATURE: 97.3 F | BODY MASS INDEX: 42.66 KG/M2

## 2022-06-27 DIAGNOSIS — G47.33 OBSTRUCTIVE SLEEP APNEA SYNDROME: Primary | ICD-10-CM

## 2022-06-27 DIAGNOSIS — I10 ESSENTIAL HYPERTENSION: ICD-10-CM

## 2022-06-27 DIAGNOSIS — K21.9 GASTROESOPHAGEAL REFLUX DISEASE WITHOUT ESOPHAGITIS: ICD-10-CM

## 2022-06-27 DIAGNOSIS — N18.30 STAGE 3 CHRONIC KIDNEY DISEASE, UNSPECIFIED WHETHER STAGE 3A OR 3B CKD (HCC): ICD-10-CM

## 2022-06-27 DIAGNOSIS — M25.562 LEFT KNEE PAIN, UNSPECIFIED CHRONICITY: ICD-10-CM

## 2022-06-27 PROCEDURE — 1123F ACP DISCUSS/DSCN MKR DOCD: CPT | Performed by: INTERNAL MEDICINE

## 2022-06-27 PROCEDURE — 99214 OFFICE O/P EST MOD 30 MIN: CPT | Performed by: INTERNAL MEDICINE

## 2022-06-27 RX ORDER — AMLODIPINE BESYLATE 5 MG/1
5 TABLET ORAL DAILY
Qty: 90 TABLET | Refills: 3 | Status: SHIPPED | OUTPATIENT
Start: 2022-06-27

## 2022-06-27 RX ORDER — HYDROCHLOROTHIAZIDE 25 MG/1
TABLET ORAL
Qty: 90 TABLET | Refills: 3 | Status: SHIPPED | OUTPATIENT
Start: 2022-06-27 | End: 2022-08-11

## 2022-06-27 RX ORDER — LISINOPRIL 40 MG/1
40 TABLET ORAL DAILY
Qty: 90 TABLET | Refills: 3 | Status: SHIPPED | OUTPATIENT
Start: 2022-06-27 | End: 2022-08-11

## 2022-06-27 RX ORDER — OMEPRAZOLE 20 MG/1
20 CAPSULE, DELAYED RELEASE ORAL DAILY
Qty: 90 CAPSULE | Refills: 3 | Status: SHIPPED | OUTPATIENT
Start: 2022-06-27

## 2022-06-27 RX ORDER — IBUPROFEN 800 MG/1
TABLET ORAL
Qty: 180 TABLET | Refills: 2 | Status: SHIPPED | OUTPATIENT
Start: 2022-06-27 | End: 2022-08-11

## 2022-06-27 SDOH — ECONOMIC STABILITY: FOOD INSECURITY: WITHIN THE PAST 12 MONTHS, THE FOOD YOU BOUGHT JUST DIDN'T LAST AND YOU DIDN'T HAVE MONEY TO GET MORE.: NEVER TRUE

## 2022-06-27 SDOH — ECONOMIC STABILITY: FOOD INSECURITY: WITHIN THE PAST 12 MONTHS, YOU WORRIED THAT YOUR FOOD WOULD RUN OUT BEFORE YOU GOT MONEY TO BUY MORE.: NEVER TRUE

## 2022-06-27 ASSESSMENT — PATIENT HEALTH QUESTIONNAIRE - PHQ9
1. LITTLE INTEREST OR PLEASURE IN DOING THINGS: 0
SUM OF ALL RESPONSES TO PHQ QUESTIONS 1-9: 0
SUM OF ALL RESPONSES TO PHQ9 QUESTIONS 1 & 2: 0
SUM OF ALL RESPONSES TO PHQ QUESTIONS 1-9: 0
2. FEELING DOWN, DEPRESSED OR HOPELESS: 0

## 2022-06-27 ASSESSMENT — ENCOUNTER SYMPTOMS
BACK PAIN: 1
NAUSEA: 0
HEARTBURN: 1
SHORTNESS OF BREATH: 0
VOICE CHANGE: 0
TROUBLE SWALLOWING: 0
PHOTOPHOBIA: 0
CHOKING: 0
VOMITING: 0

## 2022-06-27 ASSESSMENT — SOCIAL DETERMINANTS OF HEALTH (SDOH): HOW HARD IS IT FOR YOU TO PAY FOR THE VERY BASICS LIKE FOOD, HOUSING, MEDICAL CARE, AND HEATING?: NOT HARD AT ALL

## 2022-06-27 NOTE — PROGRESS NOTES
Cezar Amanda 81 y.o. male presents today with   Chief Complaint   Patient presents with    3 Month Follow-Up    Other     dicuss sleep study test results 6/21/22 at , refuse Colonscopy     Medication Refill       Other  Chronicity: sleep apnea. The problem occurs daily. Associated symptoms include arthralgias and myalgias. Pertinent negatives include no chest pain, fatigue, fever, nausea, rash or vomiting. Hypertension  This is a chronic problem. The current episode started more than 1 year ago. The problem is controlled. Associated symptoms include anxiety. Pertinent negatives include no chest pain, palpitations or shortness of breath. Knee Pain   Incident onset: years. The incident occurred at home. The pain is present in the left knee. The pain is at a severity of 5/10. The pain is moderate. The symptoms are aggravated by weight bearing and movement. He has tried NSAIDs for the symptoms. The treatment provided mild relief. Gastroesophageal Reflux  He complains of heartburn. He reports no chest pain, no choking or no nausea. This is a recurrent problem. The current episode started more than 1 year ago. The problem occurs frequently. The problem has been waxing and waning. Pertinent negatives include no fatigue. not able to see dr Marilee Wagner, seen at [facility].  Transfer to "[employer]  Sleep study a [facility]      Past Medical History:   Diagnosis Date    Degenerative arthritis of knee, bilateral     GERD (gastroesophageal reflux disease)     Hip arthritis     bilateral    Hyperlipidemia     Hypertension     Osteoarthritis of right thumb      Patient Active Problem List    Diagnosis Date Noted    Chronic renal disease, stage III ([facility] [address] [381729] 06/27/2022    Pain in left knee 03/11/2019    Unilateral primary osteoarthritis, left knee 03/11/2019    Unilateral primary osteoarthritis, left knee 03/11/2019    Acute bronchitis 01/17/2017     Past Surgical History:   Procedure Laterality Date    CARPAL TUNNEL RELEASE      COLONOSCOPY      KNEE ARTHROPLASTY Left     VASECTOMY       Family History   Problem Relation Age of Onset    Cancer Mother         uterine, breast, skin    Stroke Brother      Social History     Socioeconomic History    Marital status:      Spouse name: Not on file    Number of children: Not on file    Years of education: Not on file    Highest education level: Not on file   Occupational History    Not on file   Tobacco Use    Smoking status: Never Smoker    Smokeless tobacco: Never Used    Tobacco comment: cigars, weekly   Vaping Use    Vaping Use: Never used   Substance and Sexual Activity    Alcohol use: Yes     Alcohol/week: 0.0 standard drinks     Comment: rare    Drug use: No    Sexual activity: Not Currently   Other Topics Concern    Not on file   Social History Narrative    Not on file     Social Determinants of Health     Financial Resource Strain: Low Risk     Difficulty of Paying Living Expenses: Not hard at all   Food Insecurity: No Food Insecurity    Worried About Running Out of Food in the Last Year: Never true    [address] in the Last Year: Never true   Transportation Needs:     Lack of Transportation (Medical): Not on file    Lack of Transportation (Non-Medical):  Not on file   Physical Activity:     Days of Exercise per Week: Not on file    Minutes of Exercise per Session: Not on file   Stress:     Feeling of Stress : Not on file   Social Connections:     Frequency of Communication with Friends and Family: Not on file    Frequency of Social Gatherings with Friends and Family: Not on file    Attends Episcopal Services: Not on file    Active Member of Clubs or Organizations: Not on file    Attends Club or Organization Meetings: Not on file    Marital Status: Not on file   Intimate Partner Violence:     Fear of Current or Ex-Partner: Not on file    Emotionally Abused: Not on file    Physically Abused: Not on file    Sexually Abused: Not on file   Housing Stability:     Unable to Pay for Housing in the Last Year: Not on file    Number of Places Lived in the Last Year: Not on file    Unstable Housing in the Last Year: Not on file     No Known Allergies    Review of Systems   Constitutional: Negative for fatigue and fever. HENT: Negative for trouble swallowing and voice change. Eyes: Negative for photophobia and visual disturbance. Respiratory: Negative for choking and shortness of breath. Cardiovascular: Negative for chest pain and palpitations. Gastrointestinal: Positive for heartburn. Negative for nausea and vomiting. Genitourinary: Negative for decreased urine volume, testicular pain and urgency. Musculoskeletal: Positive for arthralgias, back pain and myalgias. Skin: Negative for rash. Neurological: Negative for tremors and syncope. Hematological: Does not bruise/bleed easily. Psychiatric/Behavioral: Negative for suicidal ideas. Vitals:    06/27/22 1307   BP: 130/88   Site: Left Upper Arm   Position: Sitting   Cuff Size: Large Adult   Pulse: 79   Temp: 97.3 °F (36.3 °C)   SpO2: 97%   Weight: (!) 347 lb (157.4 kg)   Height: 6' (1.829 m)       Physical Exam  Constitutional:       Appearance: He is well-developed. HENT:      Head: Normocephalic and atraumatic. Eyes:      Conjunctiva/sclera: Conjunctivae normal.      Pupils: Pupils are equal, round, and reactive to light. Cardiovascular:      Rate and Rhythm: Normal rate and regular rhythm. Pulmonary:      Effort: Pulmonary effort is normal. No respiratory distress. Abdominal:      General: There is no distension. Musculoskeletal:         General: Normal range of motion. Cervical back: Normal range of motion. Skin:     General: Skin is dry. Coloration: Skin is not jaundiced. Neurological:      Mental Status: He is alert. Cranial Nerves: No cranial nerve deficit.    Psychiatric:         Mood and Affect: Mood normal. Assessment/Plan  Rosalynd Gowers was seen today for 3 month follow-up, other and medication refill. Diagnoses and all orders for this visit:    Obstructive sleep apnea syndrome  -     Sleep Study with PAP Titration; Future    Essential hypertension  -     amLODIPine (NORVASC) 5 MG tablet; Take 1 tablet by mouth daily  -     hydroCHLOROthiazide (HYDRODIURIL) 25 MG tablet; Take 1 tablet by mouth once daily. -     lisinopril (PRINIVIL;ZESTRIL) 40 MG tablet; Take 1 tablet by mouth daily    Left knee pain, unspecified chronicity  -     ibuprofen (ADVIL;MOTRIN) 800 MG tablet; TAKE 1 TABLET TWICE A DAY  AS NEEDED FOR PAIN    Gastroesophageal reflux disease without esophagitis  -     omeprazole (PRILOSEC) 20 MG delayed release capsule; Take 1 capsule by mouth Daily    Stage 3 chronic kidney disease, unspecified whether stage 3a or 3b CKD ([address]     Also with normal value. Return in about 6 months (around 12/27/2022), or if symptoms worsen or fail to improve.     Bronson Yin MD

## 2022-08-08 ENCOUNTER — OFFICE VISIT (OUTPATIENT)
Dept: PRIMARY CARE CLINIC | Age: 72
End: 2022-08-08
Payer: MEDICARE

## 2022-08-08 ENCOUNTER — HOSPITAL ENCOUNTER (INPATIENT)
Age: 72
LOS: 3 days | Discharge: HOME | DRG: 287 | End: 2022-08-11
Attending: INTERNAL MEDICINE | Admitting: INTERNAL MEDICINE
Payer: MEDICARE

## 2022-08-08 ENCOUNTER — APPOINTMENT (OUTPATIENT)
Dept: GENERAL RADIOLOGY | Age: 72
DRG: 287 | End: 2022-08-08
Payer: MEDICARE

## 2022-08-08 ENCOUNTER — NURSE TRIAGE (OUTPATIENT)
Dept: OTHER | Facility: CLINIC | Age: 72
End: 2022-08-08

## 2022-08-08 VITALS
WEIGHT: 315 LBS | HEART RATE: 43 BPM | RESPIRATION RATE: 18 BRPM | HEIGHT: 72 IN | OXYGEN SATURATION: 94 % | DIASTOLIC BLOOD PRESSURE: 68 MMHG | TEMPERATURE: 97.7 F | BODY MASS INDEX: 42.66 KG/M2 | SYSTOLIC BLOOD PRESSURE: 134 MMHG

## 2022-08-08 DIAGNOSIS — R00.1 BRADYCARDIA: Primary | ICD-10-CM

## 2022-08-08 DIAGNOSIS — I49.8 VENTRICULAR BIGEMINY: Primary | ICD-10-CM

## 2022-08-08 DIAGNOSIS — I47.29 NSVT (NONSUSTAINED VENTRICULAR TACHYCARDIA): ICD-10-CM

## 2022-08-08 DIAGNOSIS — G47.33 OBSTRUCTIVE SLEEP APNEA SYNDROME: ICD-10-CM

## 2022-08-08 DIAGNOSIS — E87.6 HYPOKALEMIA: ICD-10-CM

## 2022-08-08 DIAGNOSIS — Z00.00 HEALTH CARE MAINTENANCE: ICD-10-CM

## 2022-08-08 DIAGNOSIS — I10 ESSENTIAL HYPERTENSION: ICD-10-CM

## 2022-08-08 LAB
ADENOVIRUS BY PCR: NOT DETECTED
ALBUMIN SERPL-MCNC: 3.8 G/DL (ref 3.5–4.6)
ALP BLD-CCNC: 44 U/L (ref 35–104)
ALT SERPL-CCNC: 22 U/L (ref 0–41)
ANION GAP SERPL CALCULATED.3IONS-SCNC: 12 MEQ/L (ref 9–15)
APTT: 29.3 SEC (ref 24.4–36.8)
AST SERPL-CCNC: 19 U/L (ref 0–40)
BASOPHILS ABSOLUTE: 0.1 K/UL (ref 0–0.2)
BASOPHILS RELATIVE PERCENT: 0.6 %
BILIRUB SERPL-MCNC: 0.3 MG/DL (ref 0.2–0.7)
BORDETELLA PARAPERTUSSIS BY PCR: NOT DETECTED
BORDETELLA PERTUSSIS BY PCR: NOT DETECTED
BUN BLDV-MCNC: 31 MG/DL (ref 8–23)
CALCIUM SERPL-MCNC: 9 MG/DL (ref 8.5–9.9)
CHLAMYDOPHILIA PNEUMONIAE BY PCR: NOT DETECTED
CHLORIDE BLD-SCNC: 102 MEQ/L (ref 95–107)
CO2: 26 MEQ/L (ref 20–31)
CORONAVIRUS 229E BY PCR: NOT DETECTED
CORONAVIRUS HKU1 BY PCR: NOT DETECTED
CORONAVIRUS NL63 BY PCR: NOT DETECTED
CORONAVIRUS OC43 BY PCR: NOT DETECTED
CREAT SERPL-MCNC: 1.45 MG/DL (ref 0.7–1.2)
D DIMER: 0.44 MG/L FEU (ref 0–0.5)
EOSINOPHILS ABSOLUTE: 0.5 K/UL (ref 0–0.7)
EOSINOPHILS RELATIVE PERCENT: 5.5 %
GFR AFRICAN AMERICAN: 58.1
GFR NON-AFRICAN AMERICAN: 48
GLOBULIN: 3.1 G/DL (ref 2.3–3.5)
GLUCOSE BLD-MCNC: 106 MG/DL (ref 70–99)
HCT VFR BLD CALC: 40.1 % (ref 42–52)
HEMOGLOBIN: 13.5 G/DL (ref 14–18)
HUMAN METAPNEUMOVIRUS BY PCR: NOT DETECTED
HUMAN RHINOVIRUS/ENTEROVIRUS BY PCR: NOT DETECTED
INFLUENZA A BY PCR: NOT DETECTED
INFLUENZA B BY PCR: NOT DETECTED
INR BLD: 1.1
LYMPHOCYTES ABSOLUTE: 1.1 K/UL (ref 1–4.8)
LYMPHOCYTES RELATIVE PERCENT: 12.7 %
MAGNESIUM: 1.9 MG/DL (ref 1.7–2.4)
MCH RBC QN AUTO: 29.2 PG (ref 27–31.3)
MCHC RBC AUTO-ENTMCNC: 33.5 % (ref 33–37)
MCV RBC AUTO: 87.2 FL (ref 80–100)
MONOCYTES ABSOLUTE: 0.8 K/UL (ref 0.2–0.8)
MONOCYTES RELATIVE PERCENT: 9.3 %
MYCOPLASMA PNEUMONIAE BY PCR: NOT DETECTED
NEUTROPHILS ABSOLUTE: 6.4 K/UL (ref 1.4–6.5)
NEUTROPHILS RELATIVE PERCENT: 71.9 %
PARAINFLUENZA VIRUS 1 BY PCR: NOT DETECTED
PARAINFLUENZA VIRUS 2 BY PCR: NOT DETECTED
PARAINFLUENZA VIRUS 3 BY PCR: NOT DETECTED
PARAINFLUENZA VIRUS 4 BY PCR: NOT DETECTED
PDW BLD-RTO: 13.5 % (ref 11.5–14.5)
PLATELET # BLD: 207 K/UL (ref 130–400)
POTASSIUM SERPL-SCNC: 3.2 MEQ/L (ref 3.4–4.9)
PRO-BNP: 1590 PG/ML
PROTHROMBIN TIME: 14 SEC (ref 12.3–14.9)
RBC # BLD: 4.6 M/UL (ref 4.7–6.1)
RESPIRATORY SYNCYTIAL VIRUS BY PCR: NOT DETECTED
SARS-COV-2, PCR: NOT DETECTED
SODIUM BLD-SCNC: 140 MEQ/L (ref 135–144)
TOTAL PROTEIN: 6.9 G/DL (ref 6.3–8)
TROPONIN: <0.01 NG/ML (ref 0–0.01)
TROPONIN: <0.01 NG/ML (ref 0–0.01)
TSH REFLEX: 2.88 UIU/ML (ref 0.44–3.86)
WBC # BLD: 9 K/UL (ref 4.8–10.8)

## 2022-08-08 PROCEDURE — 6370000000 HC RX 637 (ALT 250 FOR IP): Performed by: PERSONAL EMERGENCY RESPONSE ATTENDANT

## 2022-08-08 PROCEDURE — 85730 THROMBOPLASTIN TIME PARTIAL: CPT

## 2022-08-08 PROCEDURE — 93000 ELECTROCARDIOGRAM COMPLETE: CPT | Performed by: INTERNAL MEDICINE

## 2022-08-08 PROCEDURE — 85610 PROTHROMBIN TIME: CPT

## 2022-08-08 PROCEDURE — 83880 ASSAY OF NATRIURETIC PEPTIDE: CPT

## 2022-08-08 PROCEDURE — 2060000000 HC ICU INTERMEDIATE R&B

## 2022-08-08 PROCEDURE — 2580000003 HC RX 258: Performed by: PERSONAL EMERGENCY RESPONSE ATTENDANT

## 2022-08-08 PROCEDURE — 84484 ASSAY OF TROPONIN QUANT: CPT

## 2022-08-08 PROCEDURE — 6360000002 HC RX W HCPCS: Performed by: PERSONAL EMERGENCY RESPONSE ATTENDANT

## 2022-08-08 PROCEDURE — 93005 ELECTROCARDIOGRAM TRACING: CPT | Performed by: EMERGENCY MEDICINE

## 2022-08-08 PROCEDURE — 96374 THER/PROPH/DIAG INJ IV PUSH: CPT

## 2022-08-08 PROCEDURE — 99214 OFFICE O/P EST MOD 30 MIN: CPT | Performed by: INTERNAL MEDICINE

## 2022-08-08 PROCEDURE — 36415 COLL VENOUS BLD VENIPUNCTURE: CPT

## 2022-08-08 PROCEDURE — 80053 COMPREHEN METABOLIC PANEL: CPT

## 2022-08-08 PROCEDURE — 99285 EMERGENCY DEPT VISIT HI MDM: CPT

## 2022-08-08 PROCEDURE — 85025 COMPLETE CBC W/AUTO DIFF WBC: CPT

## 2022-08-08 PROCEDURE — 0202U NFCT DS 22 TRGT SARS-COV-2: CPT

## 2022-08-08 PROCEDURE — 71045 X-RAY EXAM CHEST 1 VIEW: CPT

## 2022-08-08 PROCEDURE — 84443 ASSAY THYROID STIM HORMONE: CPT

## 2022-08-08 PROCEDURE — 85379 FIBRIN DEGRADATION QUANT: CPT

## 2022-08-08 PROCEDURE — 1123F ACP DISCUSS/DSCN MKR DOCD: CPT | Performed by: INTERNAL MEDICINE

## 2022-08-08 PROCEDURE — 83735 ASSAY OF MAGNESIUM: CPT

## 2022-08-08 RX ORDER — ASPIRIN 81 MG/1
81 TABLET, CHEWABLE ORAL DAILY
Status: DISCONTINUED | OUTPATIENT
Start: 2022-08-09 | End: 2022-08-11 | Stop reason: HOSPADM

## 2022-08-08 RX ORDER — 0.9 % SODIUM CHLORIDE 0.9 %
1000 INTRAVENOUS SOLUTION INTRAVENOUS ONCE
Status: COMPLETED | OUTPATIENT
Start: 2022-08-08 | End: 2022-08-08

## 2022-08-08 RX ORDER — SODIUM CHLORIDE 0.9 % (FLUSH) 0.9 %
5-40 SYRINGE (ML) INJECTION PRN
Status: DISCONTINUED | OUTPATIENT
Start: 2022-08-08 | End: 2022-08-11 | Stop reason: HOSPADM

## 2022-08-08 RX ORDER — TRAMADOL HYDROCHLORIDE 50 MG/1
TABLET ORAL
COMMUNITY
Start: 2022-08-03

## 2022-08-08 RX ORDER — HYDRALAZINE HYDROCHLORIDE 20 MG/ML
5 INJECTION INTRAMUSCULAR; INTRAVENOUS ONCE
Status: COMPLETED | OUTPATIENT
Start: 2022-08-08 | End: 2022-08-08

## 2022-08-08 RX ORDER — SODIUM CHLORIDE 9 MG/ML
INJECTION, SOLUTION INTRAVENOUS PRN
Status: DISCONTINUED | OUTPATIENT
Start: 2022-08-08 | End: 2022-08-11 | Stop reason: HOSPADM

## 2022-08-08 RX ORDER — ACETAMINOPHEN 650 MG/1
650 SUPPOSITORY RECTAL EVERY 6 HOURS PRN
Status: DISCONTINUED | OUTPATIENT
Start: 2022-08-08 | End: 2022-08-11 | Stop reason: HOSPADM

## 2022-08-08 RX ORDER — SODIUM CHLORIDE 0.9 % (FLUSH) 0.9 %
5-40 SYRINGE (ML) INJECTION EVERY 12 HOURS SCHEDULED
Status: DISCONTINUED | OUTPATIENT
Start: 2022-08-08 | End: 2022-08-11 | Stop reason: HOSPADM

## 2022-08-08 RX ORDER — ENOXAPARIN SODIUM 100 MG/ML
40 INJECTION SUBCUTANEOUS 2 TIMES DAILY
Status: DISCONTINUED | OUTPATIENT
Start: 2022-08-08 | End: 2022-08-11 | Stop reason: HOSPADM

## 2022-08-08 RX ORDER — ACETAMINOPHEN 325 MG/1
650 TABLET ORAL EVERY 6 HOURS PRN
Status: DISCONTINUED | OUTPATIENT
Start: 2022-08-08 | End: 2022-08-11 | Stop reason: HOSPADM

## 2022-08-08 RX ORDER — ONDANSETRON 2 MG/ML
4 INJECTION INTRAMUSCULAR; INTRAVENOUS EVERY 6 HOURS PRN
Status: DISCONTINUED | OUTPATIENT
Start: 2022-08-08 | End: 2022-08-09

## 2022-08-08 RX ORDER — POTASSIUM CHLORIDE 20 MEQ/1
40 TABLET, EXTENDED RELEASE ORAL ONCE
Status: COMPLETED | OUTPATIENT
Start: 2022-08-08 | End: 2022-08-08

## 2022-08-08 RX ORDER — MAGNESIUM SULFATE IN WATER 40 MG/ML
2000 INJECTION, SOLUTION INTRAVENOUS ONCE
Status: COMPLETED | OUTPATIENT
Start: 2022-08-08 | End: 2022-08-08

## 2022-08-08 RX ORDER — ONDANSETRON 4 MG/1
4 TABLET, ORALLY DISINTEGRATING ORAL EVERY 8 HOURS PRN
Status: DISCONTINUED | OUTPATIENT
Start: 2022-08-08 | End: 2022-08-09

## 2022-08-08 RX ORDER — POLYETHYLENE GLYCOL 3350 17 G/17G
17 POWDER, FOR SOLUTION ORAL DAILY PRN
Status: DISCONTINUED | OUTPATIENT
Start: 2022-08-08 | End: 2022-08-11 | Stop reason: HOSPADM

## 2022-08-08 RX ADMIN — HYDRALAZINE HYDROCHLORIDE 5 MG: 20 INJECTION, SOLUTION INTRAMUSCULAR; INTRAVENOUS at 20:07

## 2022-08-08 RX ADMIN — SODIUM CHLORIDE 1000 ML: 9 INJECTION, SOLUTION INTRAVENOUS at 19:35

## 2022-08-08 RX ADMIN — ENOXAPARIN SODIUM 40 MG: 100 INJECTION SUBCUTANEOUS at 22:55

## 2022-08-08 RX ADMIN — POTASSIUM CHLORIDE 40 MEQ: 1500 TABLET, EXTENDED RELEASE ORAL at 19:33

## 2022-08-08 RX ADMIN — MAGNESIUM SULFATE HEPTAHYDRATE 2000 MG: 40 INJECTION, SOLUTION INTRAVENOUS at 19:39

## 2022-08-08 ASSESSMENT — ENCOUNTER SYMPTOMS
SHORTNESS OF BREATH: 1
VOMITING: 0
DIARRHEA: 0
SORE THROAT: 0
NAUSEA: 0
VOMITING: 0
SHORTNESS OF BREATH: 1
COLOR CHANGE: 0
NAUSEA: 0
COUGH: 0
COUGH: 1
ABDOMINAL PAIN: 0
RHINORRHEA: 0
WHEEZING: 0
BLOOD IN STOOL: 0
ABDOMINAL PAIN: 0
DIARRHEA: 0

## 2022-08-08 ASSESSMENT — PAIN - FUNCTIONAL ASSESSMENT: PAIN_FUNCTIONAL_ASSESSMENT: NONE - DENIES PAIN

## 2022-08-08 NOTE — ED PROVIDER NOTES
[address] [facility] ED  eMERGENCY dEPARTMENT eNCOUnter      Pt Name: Christi Travis  MRN: [id number]  [place] 1950  Date of evaluation: 8/8/2022  Provider: JERONIMO Resendez      HISTORY OF PRESENT ILLNESS    Christi Travis is a 81 y.o. male with PMHx of GERD, hyperlipidemia, hypertension presents to the emergency department with bradycardia. For 3 days patient has been having minimal lightheadedness and shortness of breath with exertion. He states he was checked for COVID 3 days ago which was negative due to cough, ultimately symptoms are improving. He was also started on Flexeril (appears has been on this since DX of bradycardia in May) and tramadol 3 days ago . In May patient was taken off atenolol and decreased lisinopril/hydrochlorothiazide dose due to bradycardia and lightheadedness. He has never seen a cardiologist, an electrophysiologist.  He denies fevers, chest pain, palpitations, nausea, vomiting, diarrhea, leg swelling. No blood thinners. HPI    Nursing Notes were reviewed. REVIEW OF SYSTEMS       Review of Systems   Constitutional:  Negative for appetite change, chills and fever. HENT:  Negative for congestion, rhinorrhea and sore throat. Respiratory:  Positive for cough and shortness of breath. Cardiovascular:  Negative for chest pain. Gastrointestinal:  Negative for abdominal pain, blood in stool, diarrhea, nausea and vomiting. Genitourinary:  Negative for difficulty urinating. Musculoskeletal:  Negative for neck stiffness. Skin:  Negative for color change and rash. Neurological:  Positive for light-headedness. Negative for dizziness, syncope, weakness, numbness and headaches. All other systems reviewed and are negative.           PAST MEDICAL HISTORY     Past Medical History:   Diagnosis Date    Degenerative arthritis of knee, bilateral     GERD (gastroesophageal reflux disease)     Hip arthritis     bilateral    Hyperlipidemia     Hypertension Osteoarthritis of right thumb          SURGICAL HISTORY       Past Surgical History:   Procedure Laterality Date    CARPAL TUNNEL RELEASE      COLONOSCOPY      KNEE ARTHROPLASTY Left     VASECTOMY           CURRENT MEDICATIONS       Previous Medications    AMLODIPINE (NORVASC) 5 MG TABLET    Take 1 tablet by mouth daily    CYCLOBENZAPRINE (FLEXERIL) 10 MG TABLET    Take 1 tablet by mouth 2 times daily as needed for Muscle spasms    HYDROCHLOROTHIAZIDE (HYDRODIURIL) 25 MG TABLET    Take 1 tablet by mouth once daily. IBUPROFEN (ADVIL;MOTRIN) 800 MG TABLET    TAKE 1 TABLET TWICE A DAY  AS NEEDED FOR PAIN    LISINOPRIL (PRINIVIL;ZESTRIL) 40 MG TABLET    Take 1 tablet by mouth daily    OMEPRAZOLE (PRILOSEC) 20 MG DELAYED RELEASE CAPSULE    Take 1 capsule by mouth Daily    TRAMADOL (ULTRAM) 50 MG TABLET    TAKE 1 TABLET BY MOUTH EVERY 6 HOURS AS NEEDED FOR PAIN       ALLERGIES     Patient has no known allergies. FAMILY HISTORY       Family History   Problem Relation Age of Onset    Cancer Mother         uterine, breast, skin    Stroke Brother           SOCIAL HISTORY       Social History     Socioeconomic History    Marital status:    Tobacco Use    Smoking status: Never    Smokeless tobacco: Never    Tobacco comments:     cigars, weekly   Vaping Use    Vaping Use: Never used   Substance and Sexual Activity    Alcohol use:  Yes     Alcohol/week: 0.0 standard drinks     Comment: rare    Drug use: No    Sexual activity: Not Currently     Social Determinants of Health     Financial Resource Strain: Low Risk     Difficulty of Paying Living Expenses: Not hard at all   Food Insecurity: No Food Insecurity    Worried About [address] [employer] [employer] in the Last Year: Never true    Ran Out of Food in the Last Year: Never true         PHYSICAL EXAM         ED Triage Vitals   BP Temp Temp src Heart Rate Resp SpO2 Height Weight   08/08/22 1804 08/08/22 1801 -- 08/08/22 1801 08/08/22 1801 08/08/22 1801 -- 08/08/22 1801   (!) 159/72 97 °F (36.1 °C)  (!) 47 18 96 %  (!) 352 lb (159.7 kg)       Physical Exam  Constitutional:       Appearance: He is well-developed. HENT:      Head: Normocephalic and atraumatic. Eyes:      Conjunctiva/sclera: Conjunctivae normal.      Pupils: Pupils are equal, round, and reactive to light. Neck:      Trachea: No tracheal deviation. Cardiovascular:      Heart sounds: Normal heart sounds. Pulmonary:      Effort: Pulmonary effort is normal. No respiratory distress. Breath sounds: Normal breath sounds. No stridor. Abdominal:      General: Bowel sounds are normal. There is no distension. Palpations: Abdomen is soft. There is no mass. Tenderness: There is no abdominal tenderness. There is no guarding or rebound. Musculoskeletal:         General: Normal range of motion. Cervical back: Normal range of motion and neck supple. Skin:     General: Skin is warm and dry. Capillary Refill: Capillary refill takes less than 2 seconds. Findings: No rash. Neurological:      Mental Status: He is alert and oriented to person, place, and time. Deep Tendon Reflexes: Reflexes are normal and symmetric. Psychiatric:         Behavior: Behavior normal.         Thought Content: Thought content normal.         Judgment: Judgment normal.       DIAGNOSTIC RESULTS     EKG:All EKG's are interpreted by the Emergency Department Physician who either signs or Co-signs this chart in the absence of a cardiologist.    Velvetia Mike with runs of PVC from 2-5 with other frequent PVCs, rate 102, no obvious ST segment changes    RADIOLOGY:   Non-plain film images such as CT, Ultrasound and MRI are read by theradiologist. Plain radiographic images are visualized and preliminarily interpreted by the emergency physician with the below findings:    Interpretation per theRadiologist below, if available at the time of this note:    XR CHEST PORTABLE   Final Result   NO ACUTE CARDIOPULMONARY DISEASE. LABS:  Labs Reviewed   COMPREHENSIVE METABOLIC PANEL - Abnormal; Notable for the following components:       Result Value    Potassium 3.2 (*)     Glucose 106 (*)     BUN 31 (*)     Creatinine 1.45 (*)     GFR Non- 48.0 (*)     GFR  58.1 (*)     All other components within normal limits   CBC WITH AUTO DIFFERENTIAL - Abnormal; Notable for the following components:    RBC 4.60 (*)     Hemoglobin 13.5 (*)     Hematocrit 40.1 (*)     All other components within normal limits   RESPIRATORY PANEL, MOLECULAR, WITH COVID-19   MAGNESIUM   TROPONIN   BRAIN NATRIURETIC PEPTIDE   TSH WITH REFLEX   PROTIME-INR   APTT   D-DIMER, QUANTITATIVE       All other labs were within normal range or not returned as of this dictation. EMERGENCY DEPARTMENT COURSE and DIFFERENTIAL DIAGNOSIS/MDM:   Vitals:    Vitals:    08/08/22 1900 08/08/22 1915 08/08/22 1930 08/08/22 1953   BP:   (!) 161/130 (!) 191/92   Pulse: 92 85 88 (!) 102   Resp: 20 24 22 22   Temp:       SpO2: 96% 93%     Weight:             MDM      Cardiac Echo May [facility] through [facility] showing LVEF 65 with no significant valve abnormalities. Potassium 3.2, BUN 31, creatinine 1.45 (baseline 1.2), magnesium 1.9, BNP 1590. Chest x-ray shows no acute process. Patient given 1 L IV fluids, magnesium 2 g, potassium 40meq. While in the ED HR has ranged from 47-100s. He has been hypertensive, states he took all his blood pressure medications this morning, given hydralazine IV. He will be admitted at this time. CRITICAL CARE TIME   Total Critical Caretime was 0 minutes, excluding separately reportable procedures. There was a high probability of clinically significant/life threatening deterioration in the patient's condition which required my urgent intervention. Procedures    FINAL IMPRESSION      1. Ventricular bigeminy    2. Hypokalemia    3.  Essential hypertension          DISPOSITION/PLAN   DISPOSITION Decision To Admit 08/08/2022 07:30:21 PM      PATIENT REFERRED TO:  No follow-up provider specified. DISCHARGE MEDICATIONS:  New Prescriptions    No medications on file          (Please notethat portions of this note were completed with a voice recognition program.  Efforts were made to edit the dictations but occasionally words are mis-transcribed. )    JERONIMO Cano (electronically signed)  Emergency Physician Assistant        JERONIMO Cerda  08/08/22 [address] [place] [address]  08/08/22 [address] [place] [address]  08/08/22 [facility]

## 2022-08-08 NOTE — TELEPHONE ENCOUNTER
Received call from [address] at [facility] with Red Flag Complaint. Subjective: Caller states \"racing heart\"     Current Symptoms: Three days ago was having covid symptoms-test was negative, covid center check hard and was told that he has a racing heart possible due to dehydration, pulse ox 40s-70s, O2 at 96. Today HR 50 and 48. Current HR is 56-63, oxgen 95% per pulse ox. About 1.5 months ago had similar issue-PCP saw and changed bp medication around and resolved. Onset: 3 days ago; sudden    Associated Symptoms: NA    Pain Severity: Denies    Temperature: Denies    What has been tried: N/A    LMP: NA Pregnant: NA    Recommended disposition: See in Office Today    Care advice provided, patient verbalizes understanding; denies any other questions or concerns; instructed to call back for any new or worsening symptoms. Irma Kaufman at [facility] calling patient to schedule     Attention Provider: Thank you for allowing me to participate in the care of your patient. The patient was connected to triage in response to information provided to the ECC/PSC. Please do not respond through this encounter as the response is not directed to a shared pool.       Reason for Disposition   Age > 60 years (Exception: brief heartbeat symptoms that went away and now feels well)    Protocols used: Heart Rate and Heartbeat Questions-ADULT-[place]

## 2022-08-08 NOTE — ED TRIAGE NOTES
Patient sent here by PCP for bradycardia, he states that his HR was in the 40's and his EKG showed a 1st degree heart block. Denies SOB or Chest pain. Resp even and unlabored. Skin warm, dry and intact. He is alert and oriented x4.

## 2022-08-08 NOTE — PROGRESS NOTES
Subjective:      Patient ID: Elaina Dexter is a 81 y.o. male    Palpitations and irregular HR x 3 days    HPI  Pt presents with 3 days of irregular and skipped heart beats. Assoc exertional SOB and lightheadedness. NO CP, no headache. No alcohol but drinks coffee daily. Seen in Urgent care last week with resp symptoms(claims COVID negative). Hx DAVID but yet to complete titrations study. Vitals 8/8/2022 6/27/2022 5/18/2022 4/4/2022 8/23/2021 8/23/2021   Pulse 43 79 71 54       Vitals 8/23/2021   Pulse 66     Admitted for bradycardia in 5/2022, resolved after atenolol DC. Cleared by cardio [facility]. Last TSH normal. NO hx anemia. 5/2022 echo: left ventricle is normal in size. There is moderate left ventricular hypertrophy. Left ventricular systolic function is normal. EF = 65 ± 5% (visual   est.) Definity contrast used for endocardial border detection. Right heart chambers not well visualized. The left atrial cavity is mildly dilated. Aortic, pulmonic, tricuspid valve not well visualized. No significant valve functional abnormalities. Past Medical History:   Diagnosis Date    Degenerative arthritis of knee, bilateral     GERD (gastroesophageal reflux disease)     Hip arthritis     bilateral    Hyperlipidemia     Hypertension     Osteoarthritis of right thumb      Past Surgical History:   Procedure Laterality Date    CARPAL TUNNEL RELEASE      COLONOSCOPY      KNEE ARTHROPLASTY Left     VASECTOMY       Social History     Socioeconomic History    Marital status:      Spouse name: Not on file    Number of children: Not on file    Years of education: Not on file    Highest education level: Not on file   Occupational History    Not on file   Tobacco Use    Smoking status: Never    Smokeless tobacco: Never    Tobacco comments:     cigars, weekly   Vaping Use    Vaping Use: Never used   Substance and Sexual Activity    Alcohol use: Yes     Alcohol/week: 0.0 standard drinks     Comment: rare    Drug use:  No Sexual activity: Not Currently   Other Topics Concern    Not on file   Social History Narrative    Not on file     Social Determinants of Health     Financial Resource Strain: Low Risk     Difficulty of Paying Living Expenses: Not hard at all   Food Insecurity: No Food Insecurity    Worried About Running Out of Food in the Last Year: Never true    Ran Out of Food in the Last Year: Never true   Transportation Needs: Not on file   Physical Activity: Not on file   Stress: Not on file   Social Connections: Not on file   Intimate Partner Violence: Not on file   Housing Stability: Not on file     Family History   Problem Relation Age of Onset    Cancer Mother         uterine, breast, skin    Stroke Brother      Allergies:  Patient has no known allergies. Patient Active Problem List   Diagnosis    Acute bronchitis    Pain in left knee    Unilateral primary osteoarthritis, left knee    Unilateral primary osteoarthritis, left knee    Chronic renal disease, stage III ([facility]) [091517]     Current Outpatient Medications on File Prior to Visit   Medication Sig Dispense Refill    traMADol (ULTRAM) 50 MG tablet TAKE 1 TABLET BY MOUTH EVERY 6 HOURS AS NEEDED FOR PAIN      amLODIPine (NORVASC) 5 MG tablet Take 1 tablet by mouth daily 90 tablet 3    ibuprofen (ADVIL;MOTRIN) 800 MG tablet TAKE 1 TABLET TWICE A DAY  AS NEEDED FOR PAIN 180 tablet 2    hydroCHLOROthiazide (HYDRODIURIL) 25 MG tablet Take 1 tablet by mouth once daily. 90 tablet 3    lisinopril (PRINIVIL;ZESTRIL) 40 MG tablet Take 1 tablet by mouth daily 90 tablet 3    omeprazole (PRILOSEC) 20 MG delayed release capsule Take 1 capsule by mouth Daily 90 capsule 3    cyclobenzaprine (FLEXERIL) 10 MG tablet Take 1 tablet by mouth 2 times daily as needed for Muscle spasms 20 tablet 0     No current facility-administered medications on file prior to visit. Review of Systems   Constitutional:  Negative for chills, diaphoresis, fatigue and fever.    HENT:  Negative for congestion, ear discharge and ear pain. Respiratory:  Positive for shortness of breath. Negative for cough and wheezing. Cardiovascular:  Positive for palpitations. Negative for chest pain. Gastrointestinal:  Negative for abdominal pain, diarrhea, nausea and vomiting. Endocrine: Negative for cold intolerance and heat intolerance. Genitourinary:  Negative for dysuria and frequency. Neurological:  Negative for dizziness and light-headedness. Objective:   /68   Pulse (!) 43   Temp 97.7 °F (36.5 °C)   Resp 18   Ht 6' (1.829 m)   Wt (!) 335 lb 12.8 oz (152.3 kg)   SpO2 94%   BMI 45.54 kg/m²     Physical Exam  Constitutional:       General: He is not in acute distress. Appearance: Normal appearance. He is not ill-appearing, toxic-appearing or diaphoretic. Cardiovascular:      Rate and Rhythm: Regular rhythm. Bradycardia present. Pulses: Normal pulses. Heart sounds: Normal heart sounds, S1 normal and S2 normal. No murmur heard. No friction rub. No gallop. Pulmonary:      Effort: Pulmonary effort is normal. No respiratory distress. Breath sounds: Normal breath sounds. No wheezing or rales. Chest:      Chest wall: No tenderness. Abdominal:      General: Bowel sounds are normal.   Neurological:      General: No focal deficit present. Mental Status: He is alert. Cranial Nerves: No cranial nerve deficit. Assessment:       Diagnosis Orders   1. Bradycardia  TSH with Reflex    EKG 12 Lead    Shannon Wilks DO, Interventional Cardiology, [place]      2. Obstructive sleep apnea syndrome  Sleep Study with PAP Titration      3.  Health care maintenance  Hepatitis C Antibody          Plan:      Orders Placed This Encounter   Procedures    Hepatitis C Antibody     Standing Status:   Future     Standing Expiration Date:   8/8/2023    TSH with Reflex     Standing Status:   Future     Standing Expiration Date:   8/8/2023    [facility], Awa Mason DO, Interventional Cardiology, [place]     Referral Priority:   Routine     Referral Type:   Eval and Treat     Referral Reason:   Specialty Services Required     Requested Specialty:   Interventional Cardiology     Number of Visits Requested:   1    EKG 12 Lead     Order Specific Question:   Reason for Exam?     Answer:   Irregular heart rate    Sleep Study with PAP Titration     Standing Status:   Future     Standing Expiration Date:   2/8/2024     Order Specific Question:   Sleep Study Titration Type     Answer:   CPAP     Order Specific Question:   Location For Sleep Study     Answer:   [place]     Order Specific Question:   Select Sleep Lab Location     Answer:   [facility]   EKG showed SR with first degree AV block and PVCs. Advised to go to [facility]. Declines EMS ride. [facility] charge nurse apprised. Since DAVID is no treated, it could be a trigger for irregular HR. No follow-ups on file.

## 2022-08-09 LAB
HCT VFR BLD CALC: 39.4 % (ref 42–52)
HEMOGLOBIN: 13.4 G/DL (ref 14–18)
MCH RBC QN AUTO: 29.8 PG (ref 27–31.3)
MCHC RBC AUTO-ENTMCNC: 34 % (ref 33–37)
MCV RBC AUTO: 87.7 FL (ref 80–100)
PDW BLD-RTO: 13.9 % (ref 11.5–14.5)
PLATELET # BLD: 206 K/UL (ref 130–400)
RBC # BLD: 4.49 M/UL (ref 4.7–6.1)
TROPONIN: <0.01 NG/ML (ref 0–0.01)
WBC # BLD: 7.9 K/UL (ref 4.8–10.8)

## 2022-08-09 PROCEDURE — 84484 ASSAY OF TROPONIN QUANT: CPT

## 2022-08-09 PROCEDURE — 6370000000 HC RX 637 (ALT 250 FOR IP): Performed by: INTERNAL MEDICINE

## 2022-08-09 PROCEDURE — 6370000000 HC RX 637 (ALT 250 FOR IP): Performed by: PERSONAL EMERGENCY RESPONSE ATTENDANT

## 2022-08-09 PROCEDURE — 99223 1ST HOSP IP/OBS HIGH 75: CPT | Performed by: INTERNAL MEDICINE

## 2022-08-09 PROCEDURE — APPSS30 APP SPLIT SHARED TIME 16-30 MINUTES: Performed by: NURSE PRACTITIONER

## 2022-08-09 PROCEDURE — 85027 COMPLETE CBC AUTOMATED: CPT

## 2022-08-09 PROCEDURE — 2060000000 HC ICU INTERMEDIATE R&B

## 2022-08-09 PROCEDURE — 36415 COLL VENOUS BLD VENIPUNCTURE: CPT

## 2022-08-09 PROCEDURE — 2580000003 HC RX 258: Performed by: PERSONAL EMERGENCY RESPONSE ATTENDANT

## 2022-08-09 PROCEDURE — 6360000002 HC RX W HCPCS: Performed by: PERSONAL EMERGENCY RESPONSE ATTENDANT

## 2022-08-09 RX ORDER — TRAMADOL HYDROCHLORIDE 50 MG/1
50 TABLET ORAL EVERY 6 HOURS PRN
Status: DISCONTINUED | OUTPATIENT
Start: 2022-08-09 | End: 2022-08-11 | Stop reason: HOSPADM

## 2022-08-09 RX ORDER — PANTOPRAZOLE SODIUM 40 MG/1
40 TABLET, DELAYED RELEASE ORAL DAILY
Status: COMPLETED | OUTPATIENT
Start: 2022-08-09 | End: 2022-08-09

## 2022-08-09 RX ORDER — CYCLOBENZAPRINE HCL 10 MG
10 TABLET ORAL 2 TIMES DAILY PRN
Status: DISCONTINUED | OUTPATIENT
Start: 2022-08-09 | End: 2022-08-11 | Stop reason: HOSPADM

## 2022-08-09 RX ORDER — PANTOPRAZOLE SODIUM 40 MG/1
40 TABLET, DELAYED RELEASE ORAL
Status: DISCONTINUED | OUTPATIENT
Start: 2022-08-10 | End: 2022-08-11 | Stop reason: HOSPADM

## 2022-08-09 RX ORDER — AMLODIPINE BESYLATE 5 MG/1
5 TABLET ORAL DAILY
Status: DISCONTINUED | OUTPATIENT
Start: 2022-08-09 | End: 2022-08-11 | Stop reason: HOSPADM

## 2022-08-09 RX ADMIN — ENOXAPARIN SODIUM 40 MG: 100 INJECTION SUBCUTANEOUS at 21:49

## 2022-08-09 RX ADMIN — CYCLOBENZAPRINE 10 MG: 10 TABLET, FILM COATED ORAL at 21:49

## 2022-08-09 RX ADMIN — Medication 10 ML: at 21:49

## 2022-08-09 RX ADMIN — PANTOPRAZOLE SODIUM 40 MG: 40 TABLET, DELAYED RELEASE ORAL at 11:43

## 2022-08-09 RX ADMIN — TRAMADOL HYDROCHLORIDE 50 MG: 50 TABLET ORAL at 21:49

## 2022-08-09 RX ADMIN — ENOXAPARIN SODIUM 40 MG: 100 INJECTION SUBCUTANEOUS at 08:32

## 2022-08-09 RX ADMIN — AMLODIPINE BESYLATE 5 MG: 5 TABLET ORAL at 11:13

## 2022-08-09 RX ADMIN — ASPIRIN 81 MG 81 MG: 81 TABLET ORAL at 08:32

## 2022-08-09 NOTE — PROGRESS NOTES
Taking over pt care, pt A&Ox4 no complaints, no SOB. HR in the 40's other vitals are stable.  Will continue to monitor

## 2022-08-09 NOTE — H&P
DEPARTMENT OF CARDIOLOGY  HISTORY AND PHYSICAL EXAM    PATIENT NAME:  Mike Henriquez    MRN:  [id number]  SERVICE DATE:  8/9/2022   SERVICE TIME:  2:26 PM    Primary Care Physician: Lorrin Kayser, MD     SUBJECTIVE  CHIEF COMPLAINT:  bradycardia    HPI:  Mike Henriquez is a 81 y.o., , male who  has a past medical history of Degenerative arthritis of knee, bilateral, GERD (gastroesophageal reflux disease), Hip arthritis, Hyperlipidemia, Hypertension, and Osteoarthritis of right thumb. that is hospitalized for bradycardia. He presented to ED yesterday with c/o  cough, minimal lightheadedness and shortness of breath with exertion x 3 days. States he thought he had COVID but tested negative. EKG in ED showed:  Sinus tachycardia with 1st degree AV block with frequent and consecutive premature ventricular complexes. Ventricualr bigeminy was noted on telemetry. HR was 47-100s in ED. Potassium 3.2, BUN 31, creatinine 1.45 (baseline 1.2), magnesium 1.9, BNP 1590. Chest x-ray shows no acute process. In ED patient was given 1 L IV fluids, magnesium 2 g, potassium 40meq. He was noted to be hypertensive, treated with IV Hydralazine. Troponin has been negative x 3. On exam today, pt denies CP, SOB, palpitations, dizziness. BP stable. HR in the 90s. Pt reports similar episodes of bradycardia in May. he was taken off atenolol and decreased Lisinopril/hydrochlorothiazide dose due to bradycardia and lightheadedness. at that time, he was supposed to follow up with Dr. Vincent Perrin in office but was unable to get an appointment time as soon as he wanted so instead he went to [facility]. States they discussed an outpatient event monitor but his symptoms resolved after medication adjustment. He had echocardiogram on 5/5/2022 which showed: The left ventricle is normal in size. There is moderate left ventricular   hypertrophy.  Left ventricular systolic function is normal. EF = 65 ± 5% (visual   est.) Definity contrast used for endocardial border detection.   - Right heart chambers not well visualized. - The left atrial cavity is mildly dilated. - Aortic, pulmonic, tricuspid valve not well visualized. No significant valve   functional abnormalities. - Normal IVC size. HPI     PAST MEDICAL HISTORY:    Past Medical History:   Diagnosis Date    Degenerative arthritis of knee, bilateral     GERD (gastroesophageal reflux disease)     Hip arthritis     bilateral    Hyperlipidemia     Hypertension     Osteoarthritis of right thumb      PAST SURGICAL HISTORY:    Past Surgical History:   Procedure Laterality Date    CARPAL TUNNEL RELEASE      COLONOSCOPY      KNEE ARTHROPLASTY Left     VASECTOMY       FAMILY HISTORY:    Family History   Problem Relation Age of Onset    Cancer Mother         uterine, breast, skin    Stroke Brother      SOCIAL HISTORY:    Social History     Socioeconomic History    Marital status:      Spouse name: Not on file    Number of children: Not on file    Years of education: Not on file    Highest education level: Not on file   Occupational History    Not on file   Tobacco Use    Smoking status: Never    Smokeless tobacco: Never    Tobacco comments:     cigars, weekly   Vaping Use    Vaping Use: Never used   Substance and Sexual Activity    Alcohol use:  Yes     Alcohol/week: 0.0 standard drinks     Comment: rare    Drug use: No    Sexual activity: Not Currently   Other Topics Concern    Not on file   Social History Narrative    Not on file     Social Determinants of Health     Financial Resource Strain: Low Risk     Difficulty of Paying Living Expenses: Not hard at all   Food Insecurity: No Food Insecurity    Worried About Running Out of Food in the Last Year: Never true    Ran Out of Food in the Last Year: Never true   Transportation Needs: Not on file   Physical Activity: Not on file   Stress: Not on file   Social Connections: Not on file   Intimate Partner Violence: Not on file   Housing Stability: Not on file     MEDICATIONS:   Prior to Admission medications    Medication Sig Start Date End Date Taking? Authorizing Provider   traMADol (ULTRAM) 50 MG tablet TAKE 1 TABLET BY MOUTH EVERY 6 HOURS AS NEEDED FOR PAIN 8/3/22   Historical Provider, MD   amLODIPine (NORVASC) 5 MG tablet Take 1 tablet by mouth daily 6/27/22   Sunday Zheng MD   ibuprofen (ADVIL;MOTRIN) 800 MG tablet TAKE 1 TABLET TWICE A DAY  AS NEEDED FOR PAIN 6/27/22   Sunday Zheng MD   hydroCHLOROthiazide (HYDRODIURIL) 25 MG tablet Take 1 tablet by mouth once daily. 6/27/22   Sunday Zheng MD   lisinopril (PRINIVIL;ZESTRIL) 40 MG tablet Take 1 tablet by mouth daily 6/27/22   Sunday Zheng MD   omeprazole (PRILOSEC) 20 MG delayed release capsule Take 1 capsule by mouth Daily 6/27/22   Sunday Zheng MD   cyclobenzaprine (FLEXERIL) 10 MG tablet Take 1 tablet by mouth 2 times daily as needed for Muscle spasms 5/18/22   Rod Ashford MD       ALLERGIES: Patient has no known allergies. REVIEW OF SYSTEM:   Review of Systems     OBJECTIVE  PHYSICAL EXAM:   Physical Exam     /74   Pulse 74   Temp 98.4 °F (36.9 °C) (Oral)   Resp 16   Wt (!) 352 lb (159.7 kg)   SpO2 100%   BMI 47.74 kg/m²     DATA:     Diagnostic tests reviewed for today's visit:    Most recent labs and imaging results reviewed.      LABS:    Recent Results (from the past 24 hour(s))   EKG 12 Lead    Collection Time: 08/08/22  6:17 PM   Result Value Ref Range    Ventricular Rate 102 BPM    Atrial Rate 102 BPM    P-R Interval 220 ms    QRS Duration 118 ms    Q-T Interval 400 ms    QTc Calculation (Bazett) 521 ms    P Axis 54 degrees    R Axis -34 degrees    T Axis 101 degrees   Comprehensive Metabolic Panel    Collection Time: 08/08/22  6:45 PM   Result Value Ref Range    Sodium 140 135 - 144 mEq/L    Potassium 3.2 (L) 3.4 - 4.9 mEq/L    Chloride 102 95 - 107 mEq/L    CO2 26 20 - 31 mEq/L    Anion Gap 12 9 - 15 mEq/L    Glucose 106 (H) 70 - 99 mg/dL    BUN 31 (H) 8 - 23 mg/dL Creatinine 1.45 (H) 0.70 - 1.20 mg/dL    GFR Non-African American 48.0 (L) >60    GFR  58.1 (L) >60    Calcium 9.0 8.5 - 9.9 mg/dL    Total Protein 6.9 6.3 - 8.0 g/dL    Albumin 3.8 3.5 - 4.6 g/dL    Total Bilirubin 0.3 0.2 - 0.7 mg/dL    Alkaline Phosphatase 44 35 - 104 U/L    ALT 22 0 - 41 U/L    AST 19 0 - 40 U/L    Globulin 3.1 2.3 - 3.5 g/dL   CBC with Auto Differential    Collection Time: 08/08/22  6:45 PM   Result Value Ref Range    WBC 9.0 4.8 - 10.8 K/uL    RBC 4.60 (L) 4.70 - 6.10 M/uL    Hemoglobin 13.5 (L) 14.0 - 18.0 g/dL    Hematocrit 40.1 (L) 42.0 - 52.0 %    MCV 87.2 80.0 - 100.0 fL    MCH 29.2 27.0 - 31.3 pg    MCHC 33.5 33.0 - 37.0 %    RDW 13.5 11.5 - 14.5 %    Platelets 545 006 - 512 K/uL    Neutrophils % 71.9 %    Lymphocytes % 12.7 %    Monocytes % 9.3 %    Eosinophils % 5.5 %    Basophils % 0.6 %    Neutrophils Absolute 6.4 1.4 - 6.5 K/uL    Lymphocytes Absolute 1.1 1.0 - 4.8 K/uL    Monocytes Absolute 0.8 0.2 - 0.8 K/uL    Eosinophils Absolute 0.5 0.0 - 0.7 K/uL    Basophils Absolute 0.1 0.0 - 0.2 K/uL   Magnesium    Collection Time: 08/08/22  6:45 PM   Result Value Ref Range    Magnesium 1.9 1.7 - 2.4 mg/dL   Troponin    Collection Time: 08/08/22  6:45 PM   Result Value Ref Range    Troponin <0.010 0.000 - 0.010 ng/mL   Brain Natriuretic Peptide    Collection Time: 08/08/22  6:45 PM   Result Value Ref Range    Pro-BNP 1,590 pg/mL   TSH with Reflex    Collection Time: 08/08/22  6:45 PM   Result Value Ref Range    TSH 2.880 0.440 - 3.860 uIU/mL   Protime-INR    Collection Time: 08/08/22  6:45 PM   Result Value Ref Range    Protime 14.0 12.3 - 14.9 sec    INR 1.1    APTT    Collection Time: 08/08/22  6:45 PM   Result Value Ref Range    aPTT 29.3 24.4 - 36.8 sec   D-Dimer, Quantitative    Collection Time: 08/08/22  6:45 PM   Result Value Ref Range    D-Dimer, Quant 0.44 0.00 - 0.50 mg/L FEU   Respiratory Panel, Molecular, with COVID-19 (Restricted: peds pts or suitable admitted adults)    Collection Time: 08/08/22  6:54 PM    Specimen: Nasopharyngeal   Result Value Ref Range    Adenovirus by PCR Not Detected Not Detected    Bordetella parapertussis by PCR Not Detected Not Detected    Bordetella pertussis by PCR Not Detected Not Detected    Chlamydophilia pneumoniae by PCR Not Detected Not Detected    Coronavirus 229E by PCR Not Detected Not Detected    Coronavirus HKU1 by PCR Not Detected Not Detected    Coronavirus NL63 by PCR Not Detected Not Detected    Coronavirus OC43 by PCR Not Detected Not Detected    SARS-CoV-2, PCR Not Detected Not Detected    Human Metapneumovirus by PCR Not Detected Not Detected    Human Rhinovirus/Enterovirus by PCR Not Detected Not Detected    Influenza A by PCR Not Detected Not Detected    Influenza B by PCR Not Detected Not Detected    Mycoplasma pneumoniae by PCR Not Detected Not Detected    Parainfluenza Virus 1 by PCR Not Detected Not Detected    Parainfluenza Virus 2 by PCR Not Detected Not Detected    Parainfluenza Virus 3 by PCR Not Detected Not Detected    Parainfluenza Virus 4 by PCR Not Detected Not Detected    Respiratory Syncytial Virus by PCR Not Detected Not Detected   Troponin    Collection Time: 08/08/22 11:16 PM   Result Value Ref Range    Troponin <0.010 0.000 - 0.010 ng/mL   Troponin    Collection Time: 08/09/22  1:42 AM   Result Value Ref Range    Troponin <0.010 0.000 - 0.010 ng/mL   CBC    Collection Time: 08/09/22  5:33 AM   Result Value Ref Range    WBC 7.9 4.8 - 10.8 K/uL    RBC 4.49 (L) 4.70 - 6.10 M/uL    Hemoglobin 13.4 (L) 14.0 - 18.0 g/dL    Hematocrit 39.4 (L) 42.0 - 52.0 %    MCV 87.7 80.0 - 100.0 fL    MCH 29.8 27.0 - 31.3 pg    MCHC 34.0 33.0 - 37.0 %    RDW 13.9 11.5 - 14.5 %    Platelets 513 271 - 548 K/uL       IMAGING:  XR CHEST PORTABLE    Result Date: 8/8/2022  EXAMINATION: XR CHEST PORTABLE CLINICAL HISTORY: LIGHTHEADED COMPARISONS: APRIL 4, 2022 FINDINGS: Osseous structures are intact.  Cardiopericardial silhouette is

## 2022-08-09 NOTE — CARE COORDINATION
[facility] Case Management Initial Discharge Assessment    Met with Patient to discuss discharge plan. PCP: Jareth Berman MD                                Date of Last Visit: [address] Patient: No        [facility] Notified: no    If no PCP, list provided? N/A    Discharge Planning    Living Arrangements: independently at home    Who do you live with? SPOUSE    Who helps you with your care:  self    If lives at home:     Do you have any barriers navigating in your home? no    Patient can perform ADL? Yes    Current Services (outpatient and in home) :  None    Dialysis: No    Is transportation available to get to your appointments? Yes    DME Equipment:  no    Respiratory equipment: None    Respiratory provider:  no     Pharmacy:  yes - [facility]    Consult with Medication Assistance Program?  No      Patient agreeable to [facility]? Declined    Patient agreeable to SNF/Rehab? N/A    Other discharge needs identified? N/A      Initial Discharge Plan? (Note: please see concurrent daily documentation for any updates after initial note). DENIES NEEDS.      Readmission Risk              Risk of Unplanned Readmission:  [id number]         Electronically signed by Mulugeta Bell RN on 8/9/2022 at 11:16 AM

## 2022-08-10 LAB
EKG ATRIAL RATE: 102 BPM
EKG P AXIS: 54 DEGREES
EKG P-R INTERVAL: 220 MS
EKG Q-T INTERVAL: 400 MS
EKG QRS DURATION: 118 MS
EKG QTC CALCULATION (BAZETT): 521 MS
EKG R AXIS: -34 DEGREES
EKG T AXIS: 101 DEGREES
EKG VENTRICULAR RATE: 102 BPM
POTASSIUM SERPL-SCNC: 3.7 MEQ/L (ref 3.4–4.9)

## 2022-08-10 PROCEDURE — 6360000002 HC RX W HCPCS: Performed by: PERSONAL EMERGENCY RESPONSE ATTENDANT

## 2022-08-10 PROCEDURE — 36415 COLL VENOUS BLD VENIPUNCTURE: CPT

## 2022-08-10 PROCEDURE — 6370000000 HC RX 637 (ALT 250 FOR IP): Performed by: INTERNAL MEDICINE

## 2022-08-10 PROCEDURE — 84132 ASSAY OF SERUM POTASSIUM: CPT

## 2022-08-10 PROCEDURE — 2060000000 HC ICU INTERMEDIATE R&B

## 2022-08-10 PROCEDURE — 6370000000 HC RX 637 (ALT 250 FOR IP): Performed by: PERSONAL EMERGENCY RESPONSE ATTENDANT

## 2022-08-10 PROCEDURE — 99233 SBSQ HOSP IP/OBS HIGH 50: CPT | Performed by: INTERNAL MEDICINE

## 2022-08-10 PROCEDURE — 2580000003 HC RX 258: Performed by: PERSONAL EMERGENCY RESPONSE ATTENDANT

## 2022-08-10 PROCEDURE — 93010 ELECTROCARDIOGRAM REPORT: CPT | Performed by: INTERNAL MEDICINE

## 2022-08-10 RX ADMIN — Medication 10 ML: at 08:31

## 2022-08-10 RX ADMIN — ENOXAPARIN SODIUM 40 MG: 100 INJECTION SUBCUTANEOUS at 08:30

## 2022-08-10 RX ADMIN — PANTOPRAZOLE SODIUM 40 MG: 40 TABLET, DELAYED RELEASE ORAL at 06:40

## 2022-08-10 RX ADMIN — ASPIRIN 81 MG 81 MG: 81 TABLET ORAL at 08:30

## 2022-08-10 RX ADMIN — Medication 10 ML: at 22:11

## 2022-08-10 RX ADMIN — AMLODIPINE BESYLATE 5 MG: 5 TABLET ORAL at 08:30

## 2022-08-10 RX ADMIN — ENOXAPARIN SODIUM 40 MG: 100 INJECTION SUBCUTANEOUS at 22:09

## 2022-08-10 RX ADMIN — TRAMADOL HYDROCHLORIDE 50 MG: 50 TABLET ORAL at 22:10

## 2022-08-10 RX ADMIN — CYCLOBENZAPRINE 10 MG: 10 TABLET, FILM COATED ORAL at 22:10

## 2022-08-10 ASSESSMENT — PAIN DESCRIPTION - ORIENTATION: ORIENTATION: RIGHT

## 2022-08-10 ASSESSMENT — PAIN DESCRIPTION - LOCATION: LOCATION: HIP

## 2022-08-10 ASSESSMENT — PAIN SCALES - GENERAL: PAINLEVEL_OUTOF10: 4

## 2022-08-10 NOTE — FLOWSHEET NOTE
2020 - PM assessment done. Pt has no complaints rina. Pt understands plan of care. Pt requested his meds to be given around 2200. Will continue to monitor. 2310 - pt sleeping comfortably. 0000 -  pt still sleeping. Pt had unevenful night.        .Electronically signed by Krysta Smart RN on 8/10/2022 at 7:45 AM

## 2022-08-10 NOTE — PROGRESS NOTES
Primary Care Physician: Saturnino Arellano MD      SUBJECTIVE  CHIEF COMPLAINT:  bradycardia     HPI:  Mala Leggett is a 81 y.o., , male who  has a past medical history of Degenerative arthritis of knee, bilateral, GERD (gastroesophageal reflux disease), Hip arthritis, Hyperlipidemia, Hypertension, and Osteoarthritis of right thumb. that is hospitalized for bradycardia. He presented to ED yesterday with c/o  cough, minimal lightheadedness and shortness of breath with exertion x 3 days. States he thought he had COVID but tested negative. EKG in ED showed:  Sinus tachycardia with 1st degree AV block with frequent and consecutive premature ventricular complexes. Ventricualr bigeminy was noted on telemetry. HR was 47-100s in ED. Potassium 3.2, BUN 31, creatinine 1.45 (baseline 1.2), magnesium 1.9, BNP 1590. Chest x-ray shows no acute process. In ED patient was given 1 L IV fluids, magnesium 2 g, potassium 40meq. He was noted to be hypertensive, treated with IV Hydralazine. Troponin has been negative x 3. On exam today, pt denies CP, SOB, palpitations, dizziness. BP stable. HR in the 90s. Pt reports similar episodes of bradycardia in May. he was taken off atenolol and decreased Lisinopril/hydrochlorothiazide dose due to bradycardia and lightheadedness. at that time, he was supposed to follow up with Dr. Staci Sheth in office but was unable to get an appointment time as soon as he wanted so instead he went to [facility]. States they discussed an outpatient event monitor but his symptoms resolved after medication adjustment. He had echocardiogram on 5/5/2022 which showed: The left ventricle is normal in size. There is moderate left ventricular   hypertrophy. Left ventricular systolic function is normal. EF = 65 ± 5% (visual   est.) Definity contrast used for endocardial border detection.   - Right heart chambers not well visualized. - The left atrial cavity is mildly dilated.    - Aortic, pulmonic, tricuspid valve not well visualized. No significant valve   functional abnormalities. - Normal IVC size. 8/10/2022: Resting comfortable. Continues to have short runs of nonsustained VT. Frequent PVCs. HPI      PAST MEDICAL HISTORY:    Past Medical History        Past Medical History:   Diagnosis Date    Degenerative arthritis of knee, bilateral      GERD (gastroesophageal reflux disease)      Hip arthritis       bilateral    Hyperlipidemia      Hypertension      Osteoarthritis of right thumb           PAST SURGICAL HISTORY:    Past Surgical History         Past Surgical History:   Procedure Laterality Date    CARPAL TUNNEL RELEASE        COLONOSCOPY        KNEE ARTHROPLASTY Left      VASECTOMY             FAMILY HISTORY:    Family History         Family History   Problem Relation Age of Onset    Cancer Mother           uterine, breast, skin    Stroke Brother           SOCIAL HISTORY:    Social History               Socioeconomic History    Marital status:        Spouse name: Not on file    Number of children: Not on file    Years of education: Not on file    Highest education level: Not on file   Occupational History    Not on file   Tobacco Use    Smoking status: Never    Smokeless tobacco: Never    Tobacco comments:       cigars, weekly   Vaping Use    Vaping Use: Never used   Substance and Sexual Activity    Alcohol use:  Yes       Alcohol/week: 0.0 standard drinks       Comment: rare    Drug use: No    Sexual activity: Not Currently   Other Topics Concern    Not on file   Social History Narrative    Not on file      Social Determinants of Health          Financial Resource Strain: Low Risk    Difficulty of Paying Living Expenses: Not hard at all   Food Insecurity: No Food Insecurity    Worried About Running Out of Food in the Last Year: Never true    Ran Out of Food in the Last Year: Never true   Transportation Needs: Not on file   Physical Activity: Not on file   Stress: Not on file   Social Connections: Not on file   Intimate Partner Violence: Not on file   Housing Stability: Not on file         MEDICATIONS:   Home Medications           Prior to Admission medications   Medication Sig Start Date End Date Taking? Authorizing Provider   traMADol (ULTRAM) 50 MG tablet TAKE 1 TABLET BY MOUTH EVERY 6 HOURS AS NEEDED FOR PAIN 8/3/22     Historical Provider, MD   amLODIPine (NORVASC) 5 MG tablet Take 1 tablet by mouth daily 6/27/22     Deanna Ring MD   ibuprofen (ADVIL;MOTRIN) 800 MG tablet TAKE 1 TABLET TWICE A DAY  AS NEEDED FOR PAIN 6/27/22     Deanna Ring MD   hydroCHLOROthiazide (HYDRODIURIL) 25 MG tablet Take 1 tablet by mouth once daily. 6/27/22     Deanna Ring MD   lisinopril (PRINIVIL;ZESTRIL) 40 MG tablet Take 1 tablet by mouth daily 6/27/22     Deanna Ring MD   omeprazole (PRILOSEC) 20 MG delayed release capsule Take 1 capsule by mouth Daily 6/27/22     Deanna Ring MD   cyclobenzaprine (FLEXERIL) 10 MG tablet Take 1 tablet by mouth 2 times daily as needed for Muscle spasms 5/18/22     Lennie Andrade MD            ALLERGIES: Patient has no known allergies. REVIEW OF SYSTEM:   Review of Systems      OBJECTIVE  PHYSICAL EXAM:   Physical Exam      /74   Pulse 74   Temp 98.4 °F (36.9 °C) (Oral)   Resp 16   Wt (!) 352 lb (159.7 kg)   SpO2 100%   BMI 47.74 kg/m²      DATA:     Diagnostic tests reviewed for today's visit:    Most recent labs and imaging results reviewed.      LABS:    Recent Results         Recent Results (from the past 24 hour(s))   EKG 12 Lead     Collection Time: 08/08/22  6:17 PM   Result Value Ref Range     Ventricular Rate 102 BPM     Atrial Rate 102 BPM     P-R Interval 220 ms     QRS Duration 118 ms     Q-T Interval 400 ms     QTc Calculation (Bazett) 521 ms     P Axis 54 degrees     R Axis -34 degrees     T Axis 101 degrees   Comprehensive Metabolic Panel     Collection Time: 08/08/22  6:45 PM   Result Value Ref Range     Sodium 140 135 - 144 mEq/L Potassium 3.2 (L) 3.4 - 4.9 mEq/L     Chloride 102 95 - 107 mEq/L     CO2 26 20 - 31 mEq/L     Anion Gap 12 9 - 15 mEq/L     Glucose 106 (H) 70 - 99 mg/dL     BUN 31 (H) 8 - 23 mg/dL     Creatinine 1.45 (H) 0.70 - 1.20 mg/dL     GFR Non- 48.0 (L) >60     GFR  58.1 (L) >60     Calcium 9.0 8.5 - 9.9 mg/dL     Total Protein 6.9 6.3 - 8.0 g/dL     Albumin 3.8 3.5 - 4.6 g/dL     Total Bilirubin 0.3 0.2 - 0.7 mg/dL     Alkaline Phosphatase 44 35 - 104 U/L     ALT 22 0 - 41 U/L     AST 19 0 - 40 U/L     Globulin 3.1 2.3 - 3.5 g/dL   CBC with Auto Differential     Collection Time: 08/08/22  6:45 PM   Result Value Ref Range     WBC 9.0 4.8 - 10.8 K/uL     RBC 4.60 (L) 4.70 - 6.10 M/uL     Hemoglobin 13.5 (L) 14.0 - 18.0 g/dL     Hematocrit 40.1 (L) 42.0 - 52.0 %     MCV 87.2 80.0 - 100.0 fL     MCH 29.2 27.0 - 31.3 pg     MCHC 33.5 33.0 - 37.0 %     RDW 13.5 11.5 - 14.5 %     Platelets 302 060 - 246 K/uL     Neutrophils % 71.9 %     Lymphocytes % 12.7 %     Monocytes % 9.3 %     Eosinophils % 5.5 %     Basophils % 0.6 %     Neutrophils Absolute 6.4 1.4 - 6.5 K/uL     Lymphocytes Absolute 1.1 1.0 - 4.8 K/uL     Monocytes Absolute 0.8 0.2 - 0.8 K/uL     Eosinophils Absolute 0.5 0.0 - 0.7 K/uL     Basophils Absolute 0.1 0.0 - 0.2 K/uL   Magnesium     Collection Time: 08/08/22  6:45 PM   Result Value Ref Range     Magnesium 1.9 1.7 - 2.4 mg/dL   Troponin     Collection Time: 08/08/22  6:45 PM   Result Value Ref Range     Troponin <0.010 0.000 - 0.010 ng/mL   Brain Natriuretic Peptide     Collection Time: 08/08/22  6:45 PM   Result Value Ref Range     Pro-BNP 1,590 pg/mL   TSH with Reflex     Collection Time: 08/08/22  6:45 PM   Result Value Ref Range     TSH 2.880 0.440 - 3.860 uIU/mL   Protime-INR     Collection Time: 08/08/22  6:45 PM   Result Value Ref Range     Protime 14.0 12.3 - 14.9 sec     INR 1.1     APTT     Collection Time: 08/08/22  6:45 PM   Result Value Ref Range     aPTT 29.3 24.4 - 36.8 sec   D-Dimer, Quantitative     Collection Time: 08/08/22  6:45 PM   Result Value Ref Range     D-Dimer, Quant 0.44 0.00 - 0.50 mg/L FEU   Respiratory Panel, Molecular, with COVID-19 (Restricted: peds pts or suitable admitted adults)     Collection Time: 08/08/22  6:54 PM     Specimen: Nasopharyngeal   Result Value Ref Range     Adenovirus by PCR Not Detected Not Detected     Bordetella parapertussis by PCR Not Detected Not Detected     Bordetella pertussis by PCR Not Detected Not Detected     Chlamydophilia pneumoniae by PCR Not Detected Not Detected     Coronavirus 229E by PCR Not Detected Not Detected     Coronavirus HKU1 by PCR Not Detected Not Detected     Coronavirus NL63 by PCR Not Detected Not Detected     Coronavirus OC43 by PCR Not Detected Not Detected     SARS-CoV-2, PCR Not Detected Not Detected     Human Metapneumovirus by PCR Not Detected Not Detected     Human Rhinovirus/Enterovirus by PCR Not Detected Not Detected     Influenza A by PCR Not Detected Not Detected     Influenza B by PCR Not Detected Not Detected     Mycoplasma pneumoniae by PCR Not Detected Not Detected     Parainfluenza Virus 1 by PCR Not Detected Not Detected     Parainfluenza Virus 2 by PCR Not Detected Not Detected     Parainfluenza Virus 3 by PCR Not Detected Not Detected     Parainfluenza Virus 4 by PCR Not Detected Not Detected     Respiratory Syncytial Virus by PCR Not Detected Not Detected   Troponin     Collection Time: 08/08/22 11:16 PM   Result Value Ref Range     Troponin <0.010 0.000 - 0.010 ng/mL   Troponin     Collection Time: 08/09/22  1:42 AM   Result Value Ref Range     Troponin <0.010 0.000 - 0.010 ng/mL   CBC     Collection Time: 08/09/22  5:33 AM   Result Value Ref Range     WBC 7.9 4.8 - 10.8 K/uL     RBC 4.49 (L) 4.70 - 6.10 M/uL     Hemoglobin 13.4 (L) 14.0 - 18.0 g/dL     Hematocrit 39.4 (L) 42.0 - 52.0 %     MCV 87.7 80.0 - 100.0 fL     MCH 29.8 27.0 - 31.3 pg     MCHC 34.0 33.0 - 37.0 %     RDW 13.9 11.5 - 14.5 %     Platelets 221 556 - 926 K/uL            IMAGING:  XR CHEST PORTABLE     Result Date: 8/8/2022  EXAMINATION: XR CHEST PORTABLE CLINICAL HISTORY: LIGHTHEADED COMPARISONS: APRIL 4, 2022 FINDINGS: Osseous structures are intact. Cardiopericardial silhouette is normal. Pulmonary vasculature is normal. Lungs clear. NO ACUTE CARDIOPULMONARY DISEASE. Impression     Lightheadedness/dizziness questionable secondary to symptomatic bradycardia versus other     Bradycardia     Shortness of breath rule out underlying cardiac ischemia versus other     Abnormal EKG concern for underlying ischemia     Nonsustained VT     Frequent PVCs/bigeminy. Normal LV function by recent echo     Hypertension    Morbid obesity. Plan     Continue to monitor on telemetry  Plan for cardiac catheterization given nonsustained VT, shortness of breath, abnormal EKG, multiple risk factors for CAD.   Consider PPM if continues to have symptoms and ischemic workup is negative  Max med rx  Monitor on tele  Gi/DVT proph  Keep potassium obtain 4-5 and magnesium greater than 2     Electronically signed by Mitchel Adamson DO on 8/10/2022 at 5:52 PM

## 2022-08-11 ENCOUNTER — APPOINTMENT (OUTPATIENT)
Dept: CARDIAC CATH/INVASIVE PROCEDURES | Age: 72
DRG: 287 | End: 2022-08-11
Payer: MEDICARE

## 2022-08-11 VITALS
RESPIRATION RATE: 17 BRPM | OXYGEN SATURATION: 95 % | DIASTOLIC BLOOD PRESSURE: 131 MMHG | SYSTOLIC BLOOD PRESSURE: 150 MMHG | BODY MASS INDEX: 42.66 KG/M2 | HEART RATE: 77 BPM | WEIGHT: 315 LBS | HEIGHT: 72 IN | TEMPERATURE: 97.1 F

## 2022-08-11 LAB
ANION GAP SERPL CALCULATED.3IONS-SCNC: 12 MEQ/L (ref 9–15)
BUN BLDV-MCNC: 21 MG/DL (ref 8–23)
CALCIUM SERPL-MCNC: 8.9 MG/DL (ref 8.5–9.9)
CHLORIDE BLD-SCNC: 103 MEQ/L (ref 95–107)
CO2: 23 MEQ/L (ref 20–31)
CREAT SERPL-MCNC: 1.06 MG/DL (ref 0.7–1.2)
GFR AFRICAN AMERICAN: >60
GFR NON-AFRICAN AMERICAN: >60
GLUCOSE BLD-MCNC: 93 MG/DL (ref 70–99)
HCT VFR BLD CALC: 44.1 % (ref 42–52)
HEMOGLOBIN: 14.6 G/DL (ref 14–18)
MCH RBC QN AUTO: 29.4 PG (ref 27–31.3)
MCHC RBC AUTO-ENTMCNC: 33.1 % (ref 33–37)
MCV RBC AUTO: 88.7 FL (ref 80–100)
PDW BLD-RTO: 13.8 % (ref 11.5–14.5)
PLATELET # BLD: 207 K/UL (ref 130–400)
POTASSIUM SERPL-SCNC: 3.7 MEQ/L (ref 3.4–4.9)
RBC # BLD: 4.97 M/UL (ref 4.7–6.1)
SODIUM BLD-SCNC: 138 MEQ/L (ref 135–144)
WBC # BLD: 8 K/UL (ref 4.8–10.8)

## 2022-08-11 PROCEDURE — 93458 L HRT ARTERY/VENTRICLE ANGIO: CPT | Performed by: INTERNAL MEDICINE

## 2022-08-11 PROCEDURE — B2111ZZ FLUOROSCOPY OF MULTIPLE CORONARY ARTERIES USING LOW OSMOLAR CONTRAST: ICD-10-PCS | Performed by: INTERNAL MEDICINE

## 2022-08-11 PROCEDURE — 36415 COLL VENOUS BLD VENIPUNCTURE: CPT

## 2022-08-11 PROCEDURE — 2500000003 HC RX 250 WO HCPCS

## 2022-08-11 PROCEDURE — 85027 COMPLETE CBC AUTOMATED: CPT

## 2022-08-11 PROCEDURE — 6360000002 HC RX W HCPCS

## 2022-08-11 PROCEDURE — 93458 L HRT ARTERY/VENTRICLE ANGIO: CPT

## 2022-08-11 PROCEDURE — 99239 HOSP IP/OBS DSCHRG MGMT >30: CPT | Performed by: INTERNAL MEDICINE

## 2022-08-11 PROCEDURE — 80048 BASIC METABOLIC PNL TOTAL CA: CPT

## 2022-08-11 PROCEDURE — 6370000000 HC RX 637 (ALT 250 FOR IP): Performed by: PERSONAL EMERGENCY RESPONSE ATTENDANT

## 2022-08-11 PROCEDURE — 4A023N7 MEASUREMENT OF CARDIAC SAMPLING AND PRESSURE, LEFT HEART, PERCUTANEOUS APPROACH: ICD-10-PCS | Performed by: INTERNAL MEDICINE

## 2022-08-11 PROCEDURE — C1894 INTRO/SHEATH, NON-LASER: HCPCS

## 2022-08-11 PROCEDURE — 6360000004 HC RX CONTRAST MEDICATION: Performed by: INTERNAL MEDICINE

## 2022-08-11 PROCEDURE — 99152 MOD SED SAME PHYS/QHP 5/>YRS: CPT | Performed by: INTERNAL MEDICINE

## 2022-08-11 PROCEDURE — 2709999900 HC NON-CHARGEABLE SUPPLY

## 2022-08-11 PROCEDURE — 6370000000 HC RX 637 (ALT 250 FOR IP): Performed by: INTERNAL MEDICINE

## 2022-08-11 PROCEDURE — C1769 GUIDE WIRE: HCPCS

## 2022-08-11 RX ORDER — PREDNISONE 50 MG/1
50 TABLET ORAL ONCE
Status: DISCONTINUED | OUTPATIENT
Start: 2022-08-11 | End: 2022-08-11 | Stop reason: HOSPADM

## 2022-08-11 RX ORDER — SODIUM CHLORIDE 0.9 % (FLUSH) 0.9 %
5-40 SYRINGE (ML) INJECTION PRN
Status: DISCONTINUED | OUTPATIENT
Start: 2022-08-11 | End: 2022-08-11 | Stop reason: HOSPADM

## 2022-08-11 RX ORDER — CARVEDILOL 3.12 MG/1
3.12 TABLET ORAL 2 TIMES DAILY
Qty: 60 TABLET | Refills: 6 | Status: SHIPPED | OUTPATIENT
Start: 2022-08-11 | End: 2022-09-29

## 2022-08-11 RX ORDER — DIPHENHYDRAMINE HCL 25 MG
50 TABLET ORAL ONCE
Status: DISCONTINUED | OUTPATIENT
Start: 2022-08-11 | End: 2022-08-11 | Stop reason: HOSPADM

## 2022-08-11 RX ORDER — ASPIRIN 81 MG/1
81 TABLET ORAL ONCE
Status: DISCONTINUED | OUTPATIENT
Start: 2022-08-11 | End: 2022-08-11 | Stop reason: HOSPADM

## 2022-08-11 RX ORDER — SODIUM CHLORIDE 9 MG/ML
INJECTION, SOLUTION INTRAVENOUS CONTINUOUS
Status: DISCONTINUED | OUTPATIENT
Start: 2022-08-11 | End: 2022-08-11 | Stop reason: HOSPADM

## 2022-08-11 RX ORDER — SODIUM CHLORIDE 0.9 % (FLUSH) 0.9 %
5-40 SYRINGE (ML) INJECTION EVERY 12 HOURS SCHEDULED
Status: DISCONTINUED | OUTPATIENT
Start: 2022-08-11 | End: 2022-08-11 | Stop reason: HOSPADM

## 2022-08-11 RX ORDER — SODIUM CHLORIDE 9 MG/ML
INJECTION, SOLUTION INTRAVENOUS PRN
Status: DISCONTINUED | OUTPATIENT
Start: 2022-08-11 | End: 2022-08-11 | Stop reason: HOSPADM

## 2022-08-11 RX ORDER — NITROGLYCERIN 0.4 MG/1
0.4 TABLET SUBLINGUAL EVERY 5 MIN PRN
Status: DISCONTINUED | OUTPATIENT
Start: 2022-08-11 | End: 2022-08-11 | Stop reason: HOSPADM

## 2022-08-11 RX ORDER — ONDANSETRON 2 MG/ML
4 INJECTION INTRAMUSCULAR; INTRAVENOUS EVERY 6 HOURS PRN
Status: DISCONTINUED | OUTPATIENT
Start: 2022-08-11 | End: 2022-08-11 | Stop reason: HOSPADM

## 2022-08-11 RX ADMIN — ASPIRIN 81 MG 81 MG: 81 TABLET ORAL at 07:04

## 2022-08-11 RX ADMIN — IOPAMIDOL 35 ML: 612 INJECTION, SOLUTION INTRAVENOUS at 09:00

## 2022-08-11 RX ADMIN — PANTOPRAZOLE SODIUM 40 MG: 40 TABLET, DELAYED RELEASE ORAL at 06:46

## 2022-08-11 RX ADMIN — AMLODIPINE BESYLATE 5 MG: 5 TABLET ORAL at 10:35

## 2022-08-11 NOTE — FLOWSHEET NOTE
Discharge instructions given to the patient. All questions answered. Right radial cath site soft, no hematoma.

## 2022-08-11 NOTE — BRIEF OP NOTE
Section of Cardiology  Adult Brief Cardiac Cath Procedure Note        Procedure(s):  LHC, b/l coronary angio    Pre-operative Diagnosis: Nonsustained VT, abnormal EKG, shortness of breath    H&P Status: Completed and reviewed.      Post-operative Diagnosis:      EF of 55%  Left main normal  LAD ectatic with mild luminal regularities  Circumflex large cardiac caliber, luminal irregularities  RCA large caliber mild disease    Findings:  See full report    Complications:  none    Primary Proceduralist:   Dr.Wes Puri DO      Full procedure note to follow

## 2022-08-11 NOTE — PROGRESS NOTES
Patient arrived to cath holding area post procedure, VSS, patient is alert and oriented and denies pain or shortness of breath.   Right radial site is stable with no bleeding or hematoma noted quikClot pressure dressing is in place

## 2022-08-11 NOTE — DISCHARGE SUMMARY
Discharge Summary    Date: 8/11/2022  Patient Name: Janet Mayo    YOB: 1950     Age: 81 y.o. Admit Date: 8/8/2022  Discharge Date:  Discharge Condition: Good    Admission Diagnosis  Hypokalemia [E87.6]; Essential hypertension [I10]; Ventricular bigeminy [I49.8]      Discharge Diagnosis  Principal Problem:    Ventricular bigeminy  Resolved Problems:    * No resolved hospital problems. [facility] AND [facility] Hospital Stay  Narrative of Hospital Course:  Patient presented with lightheadedness dizziness, shortness of breath. He was noted to be bradycardic in the emergency department apparently with multiple PVCs. On telemetry he was noted to have nonsustained VT runs that were short and asymptomatic. Patient was noted to be in acute kidney injury as well as hyperkalemia on admission. Lisinopril and HCTZ were discontinued. He underwent cardiac catheterization showing mild CAD and normal LV function. He was continued on Norvasc 5 mg daily, Coreg 3.125 mg was added due to frequent PVCs nonsustained VT. It was thought his bradycardia was due to multiple PVCs/bigeminy. He will be placed on the 21-day event monitor and follow-up for further recommendations/treatment. Follow-up with PCP. Total discharge time spent greater than 30 minutes    Consultants:  None    Surgeries/procedures Performed:      Treatments:    Cardiac Medications    Beta-Blocker, Other and Calcium Channel Blocker    Discharge Plan/Disposition:  Home    Hospital/Incidental Findings Requiring Follow Up:    Patient Instructions:    Diet: Cardiac Diet    Activity:Activity as Tolerated  For number of days (if applicable): Other Instructions:    Provider Follow-Up:   No follow-ups on file.      Significant Diagnostic Studies:    Recent Labs:  Admission on 08/08/2022  Ventricular Rate                              Date: 08/08/2022  Value: 102         Ref range: BPM                Status: Final  Atrial Rate                                   Date: 08/08/2022  Value: 102         Ref range: BPM                Status: Final  P-R Interval                                  Date: 08/08/2022  Value: 220         Ref range: ms                 Status: Final  QRS Duration                                  Date: 08/08/2022  Value: 118         Ref range: ms                 Status: Final  Q-T Interval                                  Date: 08/08/2022  Value: 400         Ref range: ms                 Status: Final  QTc Calculation (Bazett)                      Date: 08/08/2022  Value: 521         Ref range: ms                 Status: Final  P Axis                                        Date: 08/08/2022  Value: 54          Ref range: degrees            Status: Final  R Axis                                        Date: 08/08/2022  Value: -34         Ref range: degrees            Status: Final  T Axis                                        Date: 08/08/2022  Value: 101         Ref range: degrees            Status: Final  Sodium                                        Date: 08/08/2022  Value: 140         Ref range: 135 - 144 mEq/L    Status: Final  Potassium                                     Date: 08/08/2022  Value: 3.2 (A)     Ref range: 3.4 - 4.9 mEq/L    Status: Final  Chloride                                      Date: 08/08/2022  Value: 102         Ref range: 95 - 107 mEq/L     Status: Final  CO2                                           Date: 08/08/2022  Value: 26          Ref range: 20 - 31 mEq/L      Status: Final  Anion Gap                                     Date: 08/08/2022  Value: 12          Ref range: 9 - 15 mEq/L       Status: Final  Glucose                                       Date: 08/08/2022  Value: 106 (A)     Ref range: 70 - 99 mg/dL      Status: Final  BUN                                           Date: 08/08/2022  Value: 31 (A)      Ref range: 8 - 23 mg/dL       Status: Final  Creatinine                                    Date: 08/08/2022  Value: 1.45 (A) Ref range: 0.70 - 1.20 mg/dL  Status: Final  GFR Non-                      Date: 08/08/2022  Value: 48.0 (A)    Ref range: >60                Status: Final                Comment: >60 mL/min/1.73m2 EGFR, calc. for ages 25 and older using the  MDRD formula (not corrected for weight), is valid for stable  renal function. GFR                           Date: 08/08/2022  Value: 58.1 (A)    Ref range: >60                Status: Final                Comment: >60 mL/min/1.73m2 EGFR, calc. for ages 25 and older using the  MDRD formula (not corrected for weight), is valid for stable  renal function.     Calcium                                       Date: 08/08/2022  Value: 9.0         Ref range: 8.5 - 9.9 mg/dL    Status: Final  Total Protein                                 Date: 08/08/2022  Value: 6.9         Ref range: 6.3 - 8.0 g/dL     Status: Final  Albumin                                       Date: 08/08/2022  Value: 3.8         Ref range: 3.5 - 4.6 g/dL     Status: Final  Total Bilirubin                               Date: 08/08/2022  Value: 0.3         Ref range: 0.2 - 0.7 mg/dL    Status: Final  Alkaline Phosphatase                          Date: 08/08/2022  Value: 44          Ref range: 35 - 104 U/L       Status: Final  ALT                                           Date: 08/08/2022  Value: 22          Ref range: 0 - 41 U/L         Status: Final  AST                                           Date: 08/08/2022  Value: 19          Ref range: 0 - 40 U/L         Status: Final  Globulin                                      Date: 08/08/2022  Value: 3.1         Ref range: 2.3 - 3.5 g/dL     Status: Final  WBC                                           Date: 08/08/2022  Value: 9.0         Ref range: 4.8 - 10.8 K/uL    Status: Final  RBC                                           Date: 08/08/2022  Value: 4.60 (A)    Ref range: 4.70 - 6.10 M/uL   Status: Final  Hemoglobin 08/08/2022  Value: 0.5         Ref range: 0.0 - 0.7 K/uL     Status: Final  Basophils Absolute                            Date: 08/08/2022  Value: 0.1         Ref range: 0.0 - 0.2 K/uL     Status: Final  Magnesium                                     Date: 08/08/2022  Value: 1.9         Ref range: 1.7 - 2.4 mg/dL    Status: Final  Troponin                                      Date: 08/08/2022  Value: <0.010      Ref range: 0.000 - 0.010 ng*  Status: Final                Comment: Methodology by Troponin T.  Pro-BNP                                       Date: 08/08/2022  Value: 1,590       Ref range: pg/mL              Status: Final                Comment: NT-pro BNP ACUTE Interpretive Guidelines:        Age        Cutoff for Heart Failure     Less than 50 yrs   450 pg/mL     50-75 yrs           900 pg/mL     Greater than 75 yrs  1800 pg/mL    NT-pro BNP NON-ACUTE Interpretive Guidelines:      Age                 Reference Range    Less than 74 yrs   0-125 pg/mL    Greater than 74 yrs   0-450 pg/mL    Other possible causes of an elevated NT-proBNP include:  cardiac ischemia, acute coronary syndrome, COPD, pneumonia,  atrial fibrillation, pulmonary emboli, pulmonary hypertension,  pericarditis    Reference:  Nirav Browning, et al. NT-proBNP testing for diagnosis and  short-term prognosis in acute destabilized HF: an international  pooled analysis of 1256 patients.   Heart Journal.  [id number]      TSH                                           Date: 08/08/2022  Value: 2.880       Ref range: 0.440 - 3.860 uI*  Status: Final  Protime                                       Date: 08/08/2022  Value: 14.0        Ref range: 12.3 - 14.9 sec    Status: Final  INR                                           Date: 08/08/2022  Value: 1.1           Status: Final  aPTT                                          Date: 08/08/2022  Value: 29.3        Ref range: 24.4 - 36.8 sec    Status: Final                Comment: Effective 08/08/2022  Value: Not Detected                     Ref range: Not Detected       Status: Final  Influenza B by PCR                            Date: 08/08/2022  Value: Not Detected                     Ref range: Not Detected       Status: Final  Mycoplasma pneumoniae by PCR                  Date: 08/08/2022  Value: Not Detected                     Ref range: Not Detected       Status: Final  Parainfluenza Virus 1 by PCR                  Date: 08/08/2022  Value: Not Detected                     Ref range: Not Detected       Status: Final  Parainfluenza Virus 2 by PCR                  Date: 08/08/2022  Value: Not Detected                     Ref range: Not Detected       Status: Final  Parainfluenza Virus 3 by PCR                  Date: 08/08/2022  Value: Not Detected                     Ref range: Not Detected       Status: Final  Parainfluenza Virus 4 by PCR                  Date: 08/08/2022  Value: Not Detected                     Ref range: Not Detected       Status: Final  Respiratory Syncytial Virus by PCR            Date: 08/08/2022  Value: Not Detected                     Ref range: Not Detected       Status: Final  WBC                                           Date: 08/09/2022  Value: 7.9         Ref range: 4.8 - 10.8 K/uL    Status: Final  RBC                                           Date: 08/09/2022  Value: 4.49 (A)    Ref range: 4.70 - 6.10 M/uL   Status: Final  Hemoglobin                                    Date: 08/09/2022  Value: 13.4 (A)    Ref range: 14.0 - 18.0 g/dL   Status: Final  Hematocrit                                    Date: 08/09/2022  Value: 39.4 (A)    Ref range: 42.0 - 52.0 %      Status: Final  MCV                                           Date: 08/09/2022  Value: 87.7        Ref range: 80.0 - 100.0 fL    Status: Final  MCH                                           Date: 08/09/2022  Value: 29.8        Ref range: 27.0 - 31.3 pg     Status: Final  MCHC Date: 08/09/2022  Value: 34.0        Ref range: 33.0 - 37.0 %      Status: Final  RDW                                           Date: 08/09/2022  Value: 13.9        Ref range: 11.5 - 14.5 %      Status: Final  Platelets                                     Date: 08/09/2022  Value: 206         Ref range: 130 - 400 K/uL     Status: Final  Troponin                                      Date: 08/08/2022  Value: <0.010      Ref range: 0.000 - 0.010 ng*  Status: Final                Comment: Methodology by Troponin T. Troponin                                      Date: 08/09/2022  Value: <0.010      Ref range: 0.000 - 0.010 ng*  Status: Final                Comment: Methodology by Troponin T.   Potassium                                     Date: 08/10/2022  Value: 3.7         Ref range: 3.4 - 4.9 mEq/L    Status: Final  WBC                                           Date: 08/11/2022  Value: 8.0         Ref range: 4.8 - 10.8 K/uL    Status: Final  RBC                                           Date: 08/11/2022  Value: 4.97        Ref range: 4.70 - 6.10 M/uL   Status: Final  Hemoglobin                                    Date: 08/11/2022  Value: 14.6        Ref range: 14.0 - 18.0 g/dL   Status: Final  Hematocrit                                    Date: 08/11/2022  Value: 44.1        Ref range: 42.0 - 52.0 %      Status: Final  MCV                                           Date: 08/11/2022  Value: 88.7        Ref range: 80.0 - 100.0 fL    Status: Final  MCH                                           Date: 08/11/2022  Value: 29.4        Ref range: 27.0 - 31.3 pg     Status: Final  MCHC                                          Date: 08/11/2022  Value: 33.1        Ref range: 33.0 - 37.0 %      Status: Final  RDW                                           Date: 08/11/2022  Value: 13.8        Ref range: 11.5 - 14.5 %      Status: Final  Platelets                                     Date: 08/11/2022  Value: 207         Ref range: 130 - 400 K/uL     Status: Final  Sodium                                        Date: 08/11/2022  Value: 138         Ref range: 135 - 144 mEq/L    Status: Final  Potassium                                     Date: 08/11/2022  Value: 3.7         Ref range: 3.4 - 4.9 mEq/L    Status: Final  Chloride                                      Date: 08/11/2022  Value: 103         Ref range: 95 - 107 mEq/L     Status: Final  CO2                                           Date: 08/11/2022  Value: 23          Ref range: 20 - 31 mEq/L      Status: Final  Anion Gap                                     Date: 08/11/2022  Value: 12          Ref range: 9 - 15 mEq/L       Status: Final  Glucose                                       Date: 08/11/2022  Value: 93          Ref range: 70 - 99 mg/dL      Status: Final  BUN                                           Date: 08/11/2022  Value: 21          Ref range: 8 - 23 mg/dL       Status: Final  Creatinine                                    Date: 08/11/2022  Value: 1.06        Ref range: 0.70 - 1.20 mg/dL  Status: Final  GFR Non-                      Date: 08/11/2022  Value: >60.0       Ref range: >60                Status: Final                Comment: >60 mL/min/1.73m2 EGFR, calc. for ages 25 and older using the  MDRD formula (not corrected for weight), is valid for stable  renal function. GFR                           Date: 08/11/2022  Value: >60.0       Ref range: >60                Status: Final                Comment: >60 mL/min/1.73m2 EGFR, calc. for ages 25 and older using the  MDRD formula (not corrected for weight), is valid for stable  renal function.     Calcium                                       Date: 08/11/2022  Value: 8.9         Ref range: 8.5 - 9.9 mg/dL    Status: Final  ------------    Radiology last 7 days:  XR CHEST PORTABLE    Result Date: 8/8/2022  NO ACUTE CARDIOPULMONARY DISEASE.        [unfilled]    Discharge Medications    Current Discharge Medication List    START taking these medications    carvedilol (COREG) 3.125 MG tablet  Take 1 tablet by mouth in the morning and 1 tablet before bedtime. Qty: 60 tablet Refills: 6          Current Discharge Medication List        Current Discharge Medication List    CONTINUE these medications which have NOT CHANGED    traMADol (ULTRAM) 50 MG tablet  TAKE 1 TABLET BY MOUTH EVERY 6 HOURS AS NEEDED FOR PAIN    amLODIPine (NORVASC) 5 MG tablet  Take 1 tablet by mouth daily  Qty: 90 tablet Refills: 3  Associated Diagnoses:Essential hypertension    omeprazole (PRILOSEC) 20 MG delayed release capsule  Take 1 capsule by mouth Daily  Qty: 90 capsule Refills: 3  Associated Diagnoses:Gastroesophageal reflux disease without esophagitis    cyclobenzaprine (FLEXERIL) 10 MG tablet  Take 1 tablet by mouth 2 times daily as needed for Muscle spasms  Qty: 20 tablet Refills: 0  Associated Diagnoses:Hip pain          Current Discharge Medication List    STOP taking these medications    ibuprofen (ADVIL;MOTRIN) 800 MG tablet  Comments:  Reason for Stopping:    hydroCHLOROthiazide (HYDRODIURIL) 25 MG tablet  Comments:  Reason for Stopping:    lisinopril (PRINIVIL;ZESTRIL) 40 MG tablet  Comments:  Reason for Stopping:          Time Spent on Discharge:  minutes were spent in patient examination, evaluation, counseling as well as medication reconciliation, prescriptions for required medications, discharge plan, and follow up.     Electronically signed by Dominic Crow DO on 8/11/22 at 8:02 AM EDT

## 2022-08-11 NOTE — DISCHARGE INSTR - DIET
Good nutrition is important when healing from an illness, injury, or surgery. Follow any nutrition recommendations given to you during your hospital stay. If you were given an oral nutrition supplement while in the hospital, continue to take this supplement at home. You can take it with meals, in-between meals, and/or before bedtime. These supplements can be purchased at most local grocery stores, pharmacies, and chain [employer]-stores. If you have any questions about your diet or nutrition, call the hospital and ask for the dietitian.   LOW SODIUM DIET

## 2022-08-11 NOTE — FLOWSHEET NOTE
2000 - PM assessment. No complains rina. Pt stated no changes from yesterday. Pt requested to have night time meds at 2200. Pt aware of procedure tmr and Npo after midnight. 2210 - pt given night meds per MAR. PRN ultram and flexeril given. Will continue to monitor.

## 2022-08-11 NOTE — FLOWSHEET NOTE
Patient arrived to [address] [place] [address] from cath lab via wheelchair. Pt is alert and oriented x 4. Right radial cath site is soft, dressing clean and dry. Palpable right radial pulse. Dr. Laureano Khan in to examine the patient.

## 2022-08-11 NOTE — PROGRESS NOTES
Right radial band removed quik-clot with tegaderm dsg dry and intact. Site looks good no bleeding no hematoma.

## 2022-08-11 NOTE — PROGRESS NOTES
Pt sitting up in chair no distress noted pt alert x4 speaking clear full sentences no needs at this time

## 2022-08-12 ENCOUNTER — CARE COORDINATION (OUTPATIENT)
Dept: CASE MANAGEMENT | Age: 72
End: 2022-08-12

## 2022-08-12 DIAGNOSIS — I49.8 VENTRICULAR BIGEMINY: Primary | ICD-10-CM

## 2022-08-12 PROCEDURE — 1111F DSCHRG MED/CURRENT MED MERGE: CPT | Performed by: INTERNAL MEDICINE

## 2022-08-12 NOTE — CARE COORDINATION
Yrn 45 Transitions Initial Follow Up Call    Call within 2 business days of discharge: Yes    Patient: Kathrin Barber Patient : 1950   MRN: [id number]  Reason for Admission: ventricular bigeminy   Discharge Date: 22 RARS: Readmission Risk Score: 7.5      Last Discharge [facility]       Date Complaint Diagnosis Description Type Department Provider    22 Bradycardia Ventricular bigeminy . .. ED to Hosp-Admission (Discharged) (ADMITTED) [address] [place] [address]        First attempt to reach the patient for initial Care Transition call post hospital discharge. Message left with CTN's contact information requesting return phone call.       Follow Up  Future Appointments   Date Time Provider Estelita Ferreira   2022  1:00 PM Chelsea Huynh MD [facility] [place]   2022  9:15 AM Lucio Childress DO [address], [place]

## 2022-08-12 NOTE — CARE COORDINATION
Yrn 45 Transitions Initial Follow Up Call    Call within 2 business days of discharge: Yes    Patient: Gianna Eid Patient : 1950   MRN: [id number]  Reason for Admission: ventricular bigeminy   Discharge Date: 22 RARS: Readmission Risk Score: 7.5      Last Discharge [facility]       Date Complaint Diagnosis Description Type Department Provider    22 Bradycardia Ventricular bigeminy . .. ED to Hosp-Admission (Discharged) (ADMITTED) [id number] Wayne Pérez DO             Spoke with: [address]: [facility]    Non-face-to-face services provided:  Obtained and reviewed discharge summary and/or continuity of care documents    Care Transitions 24 Hour Call    Do you have a copy of your discharge instructions?: Yes  Do you have all of your prescriptions and are they filled?: Yes  Have you been contacted by a [facility] Pharmacist?: No  Have you scheduled your follow up appointment?: Yes  Do you feel like you have everything you need to keep you well at home?: Yes  Care Transitions Interventions  No Identified Needs     Received a return call from Health system SERVICES for initial low readmission risk score care transition call post hospital discharge. He reports that he is feeling \"really good\" today. He denies any dizziness or SOB today. He is getting around his house without any concerns today. He has a bandaid covering his right radial cath site today and is not using that arm for heavy lifting, pushing, or pulling today. He reports mild bruising but now drainage or hard bumps. He spoke with cardiology today and will have the event monitor placed on 22. Med review completed, 1111F entered. Health system SERVICES denies any needs, questions, or concerns at this time.      Follow Up  Future Appointments   Date Time Provider Estelita Ferreira   2022  1:00 PM Rod Ashford MD [facility] EMERGENCY [facility] AT [place]   2022  9:15 AM DO JAELYN Ashley CARDIO [facility] AT [place]   2022  9:00 AM MLOZ EKG RM 1 MLOZ EKG 10 [facility]       Gerri Hogue RN

## 2022-08-16 ENCOUNTER — OFFICE VISIT (OUTPATIENT)
Dept: PRIMARY CARE CLINIC | Age: 72
End: 2022-08-16
Payer: MEDICARE

## 2022-08-16 VITALS
TEMPERATURE: 97.3 F | DIASTOLIC BLOOD PRESSURE: 86 MMHG | SYSTOLIC BLOOD PRESSURE: 150 MMHG | WEIGHT: 315 LBS | BODY MASS INDEX: 42.66 KG/M2 | RESPIRATION RATE: 18 BRPM | HEIGHT: 72 IN | OXYGEN SATURATION: 97 % | HEART RATE: 65 BPM

## 2022-08-16 DIAGNOSIS — Z09 HOSPITAL DISCHARGE FOLLOW-UP: ICD-10-CM

## 2022-08-16 DIAGNOSIS — I47.29 NSVT (NONSUSTAINED VENTRICULAR TACHYCARDIA): Primary | ICD-10-CM

## 2022-08-16 PROCEDURE — 99495 TRANSJ CARE MGMT MOD F2F 14D: CPT | Performed by: INTERNAL MEDICINE

## 2022-08-16 PROCEDURE — 1111F DSCHRG MED/CURRENT MED MERGE: CPT | Performed by: INTERNAL MEDICINE

## 2022-08-16 NOTE — PROGRESS NOTES
Post-Discharge Transitional Care  Follow Up  Rah Jaffe   YOB: 1950  Date of Office Visit:  8/16/2022  Date of Hospital Admission: 8/8/22  Date of Hospital Discharge: 8/11/22  Risk of hospital readmission (high >=14%. Medium >=10%) :Readmission Risk Score: 7.5  Care management risk score Rising risk (score 2-5) and Complex Care (Scores >=6): No Risk Score On File   Non face to face  following discharge, date last encounter closed (first attempt may have been earlier): 08/12/2022  Call initiated 2 business days of discharge: Yes    ASSESSMENT/PLAN:   NSVT (nonsustained ventricular tachycardia) [facility])  Hospital discharge follow-up  -     [facility] DISCHARGE MEDS RECONCILED W/ CURRENT OUTPATIENT MED LIST    Medical Decision Making: moderate complexity  Return in about 2 months (around 10/16/2022) for follow up, with PCP. On this date 8/16/2022 I have spent 20 minutes reviewing previous notes, test results and face to face with the patient discussing the diagnosis and importance of compliance with the treatment plan as well as documenting on the day of the visit. Subjective:   HPI:  Follow up of Hospital problems/diagnosis(es):     Pt presents for follow up regarding ED [facility] admission for SOB, lightheadedness. EKG showed sinus bradycardia----> ER NSVT and CAROL with hypokalemia. Cardio performed LHC showing mild CAD and normal LV function. Amlodipine and Coreg for frequent PVCs. Zestoretic DCed by cardio. Placed on 21-day event monitor. Hypokalemia and CAROL normalized now. Inpatient course: Discharge summary reviewed- see chart.     Interval history/Current status: stable     Patient Active Problem List   Diagnosis    Acute bronchitis    Pain in left knee    Unilateral primary osteoarthritis, left knee    Unilateral primary osteoarthritis, left knee    Chronic renal disease, stage III (HCC) [018167]    Ventricular bigeminy    NSVT (nonsustained ventricular tachycardia) ([facility])   Medications listed time, well developed and well- nourished, in no acute distress  Skin: warm and dry, no rash or erythema  Head: normocephalic and atraumatic  Eyes: pupils equal, round, and reactive to light, extraocular eye movements intact, conjunctivae normal  ENT: tympanic membrane, external ear and ear canal normal bilaterally, nose without deformity, nasal mucosa and turbinates normal without polyps  Neck: supple and non-tender without mass, no thyromegaly or thyroid nodules, no cervical lymphadenopathy  Pulmonary/Chest: clear to auscultation bilaterally- no wheezes, rales or rhonchi, normal air movement, no respiratory distress  Cardiovascular: normal rate, regular rhythm, normal S1 and S2, no murmurs, rubs, clicks, or gallops, distal pulses intact, no carotid bruits  Abdomen: soft, non-tender, non-distended, normal bowel sounds, no masses or organomegaly  Extremities: no cyanosis, clubbing or edema  Musculoskeletal: normal range of motion, no joint swelling, deformity or tenderness  Neurologic: reflexes normal and symmetric, no cranial nerve deficit, gait, coordination and speech normal    An electronic signature was used to authenticate this note.   --Bethany Grayson MD

## 2022-08-19 ENCOUNTER — CARE COORDINATION (OUTPATIENT)
Dept: CASE MANAGEMENT | Age: 72
End: 2022-08-19

## 2022-08-19 NOTE — CARE COORDINATION
Yrn 45 Transitions Follow Up Call    2022    Patient: Opal Cook  Patient : 1950   MRN: [id number]  Reason for Admission: ventricular bigeminy   Discharge Date: 22 RARS: Readmission Risk Score: 7.5    Attempted to reach the patient for subsequent Care Transition call. Message left with CTN's contact information requesting return phone call.      Follow Up  Future Appointments   Date Time Provider Estelita Ferreira   2022  9:15 AM DO JAELYN Velásquez CARDIO [facility] EMERGENCY [facility] AT [place]   2022  9:00 AM MLOZ EKG  [facility]   10/17/2022  1:45 PM Chuck Showers, [address]       Norbert Rosas, Critical access hospital0 [facility]

## 2022-08-23 ENCOUNTER — OFFICE VISIT (OUTPATIENT)
Dept: CARDIOLOGY CLINIC | Age: 72
End: 2022-08-23
Payer: MEDICARE

## 2022-08-23 ENCOUNTER — TELEPHONE (OUTPATIENT)
Dept: CARDIOLOGY CLINIC | Age: 72
End: 2022-08-23

## 2022-08-23 VITALS
DIASTOLIC BLOOD PRESSURE: 86 MMHG | BODY MASS INDEX: 47.77 KG/M2 | OXYGEN SATURATION: 95 % | SYSTOLIC BLOOD PRESSURE: 138 MMHG | HEART RATE: 87 BPM | WEIGHT: 315 LBS | RESPIRATION RATE: 16 BRPM

## 2022-08-23 DIAGNOSIS — N18.31 STAGE 3A CHRONIC KIDNEY DISEASE (HCC): ICD-10-CM

## 2022-08-23 DIAGNOSIS — I49.8 VENTRICULAR BIGEMINY: Primary | ICD-10-CM

## 2022-08-23 DIAGNOSIS — I25.10 CORONARY ARTERY DISEASE INVOLVING NATIVE CORONARY ARTERY OF NATIVE HEART WITHOUT ANGINA PECTORIS: ICD-10-CM

## 2022-08-23 DIAGNOSIS — I47.29 NSVT (NONSUSTAINED VENTRICULAR TACHYCARDIA): ICD-10-CM

## 2022-08-23 DIAGNOSIS — E66.01 MORBID OBESITY (HCC): ICD-10-CM

## 2022-08-23 PROCEDURE — 1123F ACP DISCUSS/DSCN MKR DOCD: CPT | Performed by: INTERNAL MEDICINE

## 2022-08-23 PROCEDURE — 99214 OFFICE O/P EST MOD 30 MIN: CPT | Performed by: INTERNAL MEDICINE

## 2022-08-23 NOTE — TELEPHONE ENCOUNTER
Per Dr Rosemary Hart please see if appointment for 21 day event monitor can be sooner than 9/19.  Left message for biometrics to call back

## 2022-08-23 NOTE — PROGRESS NOTES
Chief Complaint   Patient presents with    Follow-Up from [address] [place] [address],[address]    Dizziness    Bradycardia          Hospital Stay  Narrative of Hospital Course:  Patient presented with lightheadedness dizziness, shortness of breath. He was noted to be bradycardic in the emergency department apparently with multiple PVCs. On telemetry he was noted to have nonsustained VT runs that were short and asymptomatic. Patient was noted to be in acute kidney injury as well as hyperkalemia on admission. Lisinopril and HCTZ were discontinued. He underwent cardiac catheterization showing mild CAD and normal LV function. He was continued on Norvasc 5 mg daily, Coreg 3.125 mg was added due to frequent PVCs nonsustained VT. It was thought his bradycardia was due to multiple PVCs/bigeminy. He will be placed on the 21-day event monitor and follow-up for further recommendations/treatment. Follow-up with PCP. Total discharge time spent greater than 30 minutes      8-23-22: Patient presents for initial medical evaluation. Patient is followed on a regular basis by Dr. Ana Copeland MD. patient is status post hospitalization for lightheadedness dizziness and shortness of breath. He was noted to have multiple PVCs. He was also noted to be bradycardic but most likely due to frequent PVCs/bigeminy. He underwent cardiac catheterization showing mild CAD with normal LV function. Most recent echocardiogram in May 5, 2022 ejection fraction of 65%, no significant valve abnormality. His hydrochlorothiazide and lisinopril was discontinued. Patient will have a 21-day event monitor placed. He has been feeling good and no further episodes of lightheadedness dizziness or shortness of breath or syncope.           Patient Active Problem List   Diagnosis    Acute bronchitis    Pain in left knee    Unilateral primary osteoarthritis, left knee    Unilateral primary osteoarthritis, left knee    Chronic renal disease, stage III ([address] [101830]    Ventricular bigeminy    NSVT (nonsustained ventricular tachycardia) (HCC)    Morbid obesity (HCC)    Coronary artery disease involving native coronary artery of native heart without angina pectoris       Past Surgical History:   Procedure Laterality Date    CARPAL TUNNEL RELEASE      COLONOSCOPY      KNEE ARTHROPLASTY Left     VASECTOMY         Social History     Socioeconomic History    Marital status:      Spouse name: None    Number of children: None    Years of education: None    Highest education level: None   Tobacco Use    Smoking status: Never    Smokeless tobacco: Never    Tobacco comments:     cigars, weekly   Vaping Use    Vaping Use: Never used   Substance and Sexual Activity    Alcohol use: Yes     Alcohol/week: 0.0 standard drinks     Comment: rare    Drug use: No    Sexual activity: Not Currently     Social Determinants of Health     Financial Resource Strain: Low Risk     Difficulty of Paying Living Expenses: Not hard at all   Food Insecurity: No Food Insecurity    Worried About Running Out of Food in the Last Year: Never true    Ran Out of Food in the Last Year: Never true       Family History   Problem Relation Age of Onset    Cancer Mother         uterine, breast, skin    Stroke Brother        Current Outpatient Medications   Medication Sig Dispense Refill    carvedilol (COREG) 3.125 MG tablet Take 1 tablet by mouth in the morning and 1 tablet before bedtime. 60 tablet 6    traMADol (ULTRAM) 50 MG tablet TAKE 1 TABLET BY MOUTH EVERY 6 HOURS AS NEEDED FOR PAIN      amLODIPine (NORVASC) 5 MG tablet Take 1 tablet by mouth daily 90 tablet 3    omeprazole (PRILOSEC) 20 MG delayed release capsule Take 1 capsule by mouth Daily 90 capsule 3    cyclobenzaprine (FLEXERIL) 10 MG tablet Take 1 tablet by mouth 2 times daily as needed for Muscle spasms 20 tablet 0     No current facility-administered medications for this visit. Patient has no known allergies.     Review of

## 2022-08-24 ENCOUNTER — HOSPITAL ENCOUNTER (OUTPATIENT)
Dept: NON INVASIVE DIAGNOSTICS | Age: 72
Discharge: HOME | End: 2022-08-24
Payer: MEDICARE

## 2022-08-24 DIAGNOSIS — I47.29 NSVT (NONSUSTAINED VENTRICULAR TACHYCARDIA): ICD-10-CM

## 2022-08-24 PROCEDURE — 93270 REMOTE 30 DAY ECG REV/REPORT: CPT

## 2022-08-25 ENCOUNTER — CARE COORDINATION (OUTPATIENT)
Dept: CASE MANAGEMENT | Age: 72
End: 2022-08-25

## 2022-08-25 NOTE — CARE COORDINATION
[facility] Transitions Follow Up Call    2022    Patient: Elly Tran  Patient : 1950   MRN: [id number]  Reason for Admission: ventricular bigeminy   Discharge Date: 22 RARS: Readmission Risk Score: 7.5    Attempted to reach the patient for subsequent Care Transition call. Message left with CTN's contact information requesting return phone call. Will sign off.      Follow Up  Future Appointments   Date Time Provider Estelita Ferreira   2022 10:30 AM MARCELLA Gurrola - CNP BRITANYF CARDIO [facility] EMERGENCY [facility] AT [place]   10/17/2022  1:45 PM Golden Stark, [address], [address] [facility]

## 2022-09-06 ENCOUNTER — TELEPHONE (OUTPATIENT)
Dept: PRIMARY CARE CLINIC | Age: 72
End: 2022-09-06

## 2022-09-06 DIAGNOSIS — U07.1 COVID-19: Primary | ICD-10-CM

## 2022-09-06 NOTE — TELEPHONE ENCOUNTER
----- Message from Lizzie Rojo sent at 9/6/2022 12:19 PM EDT -----  Subject: Message to Provider    QUESTIONS  Information for Provider? Mary Officer tested positive for Covid 9-5 & 9-6 has a   stuffy nose & slightly sore throat and would like to know what his next   steps should be. Please contact him to let him know what he should be   doing.  ---------------------------------------------------------------------------  --------------  Sher LORA  [phone]; OK to leave message on voicemail  ---------------------------------------------------------------------------  --------------  SCRIPT ANSWERS  Relationship to Patient?  Self

## 2022-09-16 NOTE — PROCEDURES
Hollie Pierce [place] [address] [place] [address], [id number] [facility]                                 EVENT MONITOR    PATIENT NAME: Alla Jorge                     :        1950  MED REC NO:   [id number]                            ROOM:  ACCOUNT NO:   [de-identified]                           ADMIT DATE: 2022  PROVIDER:     Shanita Bates DO      A 21-DAY EVENT MONITOR    REFERRING PROVIDERS:  Lucio Puri DO and Prema Mooney MD    STUDY PERFORMED:  A 21-day event monitor. INDICATION FOR STUDY:  Ventricular tachycardia. DATES OF ENROLLMENT:  2022 until 2022    FINDINGS:  The predominant rhythm is normal sinus rhythm. Average heart  rate 77 beats per minute. There were physiologic episodes of sinus  bradycardia and sinus tachycardia. There were no significant  bradyarrhythmias, AV blocks, and/or pauses. There were occasional PACs  and PVCs. There were short salvos of paroxysmal supraventricular  tachycardia. The longest episode lasted 9 beats in duration, 3 seconds  at a heart rate of 157 beats per minute. There were no episodes of  significant ventricular tachycardia. Occasional PVCs. No reported  symptoms. CONCLUSION:  This 21-day event monitor is negative for any significant  arrhythmias.         Pat Christine DO    D: 2022 41:18:82       T: 2022 19:01:45     [email or web address]  Job#: [id number]     Doc#: [id number]    CC:  MD Talat Shah Ala, DO

## 2022-09-29 ENCOUNTER — OFFICE VISIT (OUTPATIENT)
Dept: CARDIOLOGY CLINIC | Age: 72
End: 2022-09-29
Payer: MEDICARE

## 2022-09-29 VITALS
DIASTOLIC BLOOD PRESSURE: 84 MMHG | OXYGEN SATURATION: 96 % | HEART RATE: 89 BPM | SYSTOLIC BLOOD PRESSURE: 136 MMHG | WEIGHT: 315 LBS | BODY MASS INDEX: 47.63 KG/M2

## 2022-09-29 DIAGNOSIS — E66.01 MORBID OBESITY (HCC): ICD-10-CM

## 2022-09-29 DIAGNOSIS — Z71.2 ENCOUNTER TO DISCUSS TEST RESULTS: Primary | ICD-10-CM

## 2022-09-29 PROCEDURE — 1123F ACP DISCUSS/DSCN MKR DOCD: CPT | Performed by: NURSE PRACTITIONER

## 2022-09-29 PROCEDURE — 99213 OFFICE O/P EST LOW 20 MIN: CPT | Performed by: NURSE PRACTITIONER

## 2022-09-29 RX ORDER — IBUPROFEN 800 MG/1
800 TABLET ORAL AS NEEDED
COMMUNITY
End: 2022-11-01 | Stop reason: SDUPTHER

## 2022-09-29 RX ORDER — CARVEDILOL 3.12 MG/1
3.12 TABLET ORAL 2 TIMES DAILY
Qty: 60 TABLET | Refills: 3 | Status: SHIPPED | OUTPATIENT
Start: 2022-09-29

## 2022-09-29 NOTE — PROGRESS NOTES
Chief Complaint   Patient presents with    Results     [facility]  Narrative of Hospital Course:  Patient presented with lightheadedness dizziness, shortness of breath. He was noted to be bradycardic in the emergency department apparently with multiple PVCs. On telemetry he was noted to have nonsustained VT runs that were short and asymptomatic. Patient was noted to be in acute kidney injury as well as hyperkalemia on admission. Lisinopril and HCTZ were discontinued. He underwent cardiac catheterization showing mild CAD and normal LV function. He was continued on Norvasc 5 mg daily, Coreg 3.125 mg was added due to frequent PVCs nonsustained VT. It was thought his bradycardia was due to multiple PVCs/bigeminy. He will be placed on the 21-day event monitor and follow-up for further recommendations/treatment. Follow-up with PCP. Total discharge time spent greater than 30 minutes      8-23-22: Patient presents for initial medical evaluation. Patient is followed on a regular basis by Dr. Micah Martinez MD. patient is status post hospitalization for lightheadedness dizziness and shortness of breath. He was noted to have multiple PVCs. He was also noted to be bradycardic but most likely due to frequent PVCs/bigeminy. He underwent cardiac catheterization showing mild CAD with normal LV function. Most recent echocardiogram in May 5, 2022 ejection fraction of 65%, no significant valve abnormality. His hydrochlorothiazide and lisinopril was discontinued. Patient will have a 21-day event monitor placed. He has been feeling good and no further episodes of lightheadedness dizziness or shortness of breath or syncope. 9/29/22: pt seen in office today to discuss event monitor results. 21 day event monitor showed NSR, HR 77, 94% of the time. Pt had multiple runs of SVT, longest lasted 9 beats, 3 seconds. Pt had 2 episodes of VT, pt denies any symptoms.   Pt denies chest pain, dyspnea, dyspnea on exertion, change in exercise capacity, fatigue,  nausea, vomiting, diarrhea, constipation, motor weakness, insomnia, weight loss, syncope, dizziness, lightheadedness, palpitations, PND, orthopnea, or claudication. No bleeding issues. Pt denies any angina or CHF type symptoms. No recent hospitalizations. Pt is compliant with all Rx medications. Blood pressure and heart rate are under control. pt recently had sleep study which revealed severe DAVID. Pt is scheduled for CPAP titration study next week. Pt is scheduled for hip replacement surgery on 10/25. Cardiac cath with mild CAD and normal LVEF in May of this year. Patient Active Problem List   Diagnosis    Acute bronchitis    Pain in left knee    Unilateral primary osteoarthritis, left knee    Unilateral primary osteoarthritis, left knee    Chronic renal disease, stage III ([facility]) [839690]    Ventricular bigeminy    NSVT (nonsustained ventricular tachycardia) ([facility])    Morbid obesity ([address]    Coronary artery disease involving native coronary artery of native heart without angina pectoris       Past Surgical History:   Procedure Laterality Date    CARPAL TUNNEL RELEASE      COLONOSCOPY      KNEE ARTHROPLASTY Left     VASECTOMY         Social History     Socioeconomic History    Marital status:    Tobacco Use    Smoking status: Never    Smokeless tobacco: Never    Tobacco comments:     cigars, weekly   Vaping Use    Vaping Use: Never used   Substance and Sexual Activity    Alcohol use:  Yes     Alcohol/week: 0.0 standard drinks     Comment: rare    Drug use: No    Sexual activity: Not Currently     Social Determinants of Health     Financial Resource Strain: Low Risk     Difficulty of Paying Living Expenses: Not hard at all   Food Insecurity: No Food Insecurity    Worried About Running Out of Food in the Last Year: Never true    Ran Out of Food in the Last Year: Never true       Family History   Problem Relation Age of Onset Cancer Mother         uterine, breast, skin    Stroke Brother        Current Outpatient Medications   Medication Sig Dispense Refill    ibuprofen (IBU) 800 MG tablet Take 800 mg by mouth as needed for Pain      carvedilol (COREG) 3.125 MG tablet Take 1 tablet by mouth 2 times daily 60 tablet 3    traMADol (ULTRAM) 50 MG tablet TAKE 1 TABLET BY MOUTH EVERY 6 HOURS AS NEEDED FOR PAIN      amLODIPine (NORVASC) 5 MG tablet Take 1 tablet by mouth daily 90 tablet 3    omeprazole (PRILOSEC) 20 MG delayed release capsule Take 1 capsule by mouth Daily 90 capsule 3    cyclobenzaprine (FLEXERIL) 10 MG tablet Take 1 tablet by mouth 2 times daily as needed for Muscle spasms 20 tablet 0     No current facility-administered medications for this visit. Patient has no known allergies. Review of Systems:  General ROS: negative  Psychological ROS: negative  Hematological and Lymphatic ROS: No history of blood clots or bleeding disorder. Respiratory ROS: no cough, shortness of breath, or wheezing  Cardiovascular ROS: no chest pain or dyspnea on exertion  Gastrointestinal ROS: no abdominal pain, change in bowel habits, or black or bloody stools  Genito-Urinary ROS: no dysuria, trouble voiding, or hematuria  Musculoskeletal ROS: negative  Neurological ROS: no TIA or stroke symptoms  Dermatological ROS: negative    VITALS:  Blood pressure 136/84, pulse 89, weight (!) 351 lb 3.2 oz (159.3 kg), SpO2 96 %. Body mass index is 47.63 kg/m². Physical Examination:  General appearance - alert, well appearing, and in no distress  Mental status - alert, oriented to person, place, and time  Neck - Neck is supple, no JVD or carotid bruits. No thyromegaly or adenopathy.    Chest - clear to auscultation, no wheezes, rales or rhonchi, symmetric air entry  Heart - normal rate, regular rhythm, normal S1, S2, no murmurs, rubs, clicks or gallops  Abdomen - soft, nontender, nondistended, no masses or organomegaly  Neurological - alert, oriented, normal speech, no focal findings or movement disorder noted  Extremities - peripheral pulses normal, no pedal edema, no clubbing or cyanosis  Skin - normal coloration and turgor, no rashes, no suspicious skin lesions noted      EKG: normal sinus rhythm, first degree AVB. No orders of the defined types were placed in this encounter. ASSESSMENT:     Diagnosis Orders   1. Encounter to discuss test results        2. Morbid obesity ([facility] [address]                PLAN:         As always, aggressive risk factor modification is strongly recommended. We should adhere to the JNC VIII guidelines for HTN management and the NCEP ATP III guidelines for LDL-C management. Cardiac diet is always recommended with low fat, cholesterol, calories and sodium. Continue medications at current doses. Check EKG next OV    Patient is considered a low to intermediate risk patient for proposed orthopedic surgery at this time. No active issues such as acute angina, congestive heart failure or uncontrolled arrhythmias. Patient was advised and encouraged to check blood pressure at home or at a pharmacy, maintain a logbook, and also call us back if blood pressure are above the target ranges or if it is low. Patient clearly understands and agrees to the instructions. We will need to continue to monitor muscle and liver enzymes, BUN, CR, and electrolytes.

## 2022-10-04 ENCOUNTER — HOSPITAL ENCOUNTER (OUTPATIENT)
Dept: SLEEP CENTER | Age: 72
Discharge: HOME | End: 2022-10-06
Payer: MEDICARE

## 2022-10-04 PROCEDURE — 95811 POLYSOM 6/>YRS CPAP 4/> PARM: CPT

## 2022-10-05 PROCEDURE — 95811 POLYSOM 6/>YRS CPAP 4/> PARM: CPT | Performed by: INTERNAL MEDICINE

## 2022-10-10 ENCOUNTER — OFFICE VISIT (OUTPATIENT)
Dept: PRIMARY CARE CLINIC | Age: 72
End: 2022-10-10
Payer: MEDICARE

## 2022-10-10 VITALS
HEART RATE: 79 BPM | OXYGEN SATURATION: 96 % | DIASTOLIC BLOOD PRESSURE: 90 MMHG | TEMPERATURE: 97.6 F | WEIGHT: 315 LBS | SYSTOLIC BLOOD PRESSURE: 130 MMHG | HEIGHT: 72 IN | BODY MASS INDEX: 42.66 KG/M2

## 2022-10-10 DIAGNOSIS — M25.551 HIP PAIN, RIGHT: Primary | ICD-10-CM

## 2022-10-10 DIAGNOSIS — Z01.818 PRE-OP EVALUATION: ICD-10-CM

## 2022-10-10 DIAGNOSIS — Z23 NEED FOR INFLUENZA VACCINATION: ICD-10-CM

## 2022-10-10 PROBLEM — G47.33 OSA (OBSTRUCTIVE SLEEP APNEA): Status: ACTIVE | Noted: 2022-10-10

## 2022-10-10 PROCEDURE — 90694 VACC AIIV4 NO PRSRV 0.5ML IM: CPT | Performed by: INTERNAL MEDICINE

## 2022-10-10 PROCEDURE — 1123F ACP DISCUSS/DSCN MKR DOCD: CPT | Performed by: INTERNAL MEDICINE

## 2022-10-10 PROCEDURE — G0008 ADMIN INFLUENZA VIRUS VAC: HCPCS | Performed by: INTERNAL MEDICINE

## 2022-10-10 PROCEDURE — 99213 OFFICE O/P EST LOW 20 MIN: CPT | Performed by: INTERNAL MEDICINE

## 2022-10-10 ASSESSMENT — ENCOUNTER SYMPTOMS
CHOKING: 0
BACK PAIN: 1
VOMITING: 0
VOICE CHANGE: 0
PHOTOPHOBIA: 0
SHORTNESS OF BREATH: 0
TROUBLE SWALLOWING: 0
NAUSEA: 0

## 2022-10-10 ASSESSMENT — PATIENT HEALTH QUESTIONNAIRE - PHQ9
1. LITTLE INTEREST OR PLEASURE IN DOING THINGS: 0
SUM OF ALL RESPONSES TO PHQ QUESTIONS 1-9: 0
SUM OF ALL RESPONSES TO PHQ9 QUESTIONS 1 & 2: 0
SUM OF ALL RESPONSES TO PHQ QUESTIONS 1-9: 0
2. FEELING DOWN, DEPRESSED OR HOPELESS: 0
SUM OF ALL RESPONSES TO PHQ QUESTIONS 1-9: 0
SUM OF ALL RESPONSES TO PHQ QUESTIONS 1-9: 0

## 2022-10-10 NOTE — PROGRESS NOTES
Rodrigo Hunter 81 y.o. male presents today with   Chief Complaint   Patient presents with    Pre-op Exam     Dr. Claudia Szymanski; right total hip on 10/25/2022    Medication Refill    Other     Refused colonoscopy     History and physical  Pre op. Hip Pain   Incident onset: years. The incident occurred at home. There was no injury mechanism. The pain is present in the right hip. The pain is at a severity of 7/10. The pain is severe. The pain has been Worsening since onset. Associated symptoms include a loss of motion. The symptoms are aggravated by weight bearing and movement. pre op.      Past Medical History:   Diagnosis Date    Coronary artery disease involving native coronary artery of native heart without angina pectoris 8/23/2022    Degenerative arthritis of knee, bilateral     GERD (gastroesophageal reflux disease)     Hip arthritis     bilateral    Hyperlipidemia     Hypertension     Morbid obesity ([address] 8/23/2022    Osteoarthritis of right thumb      Patient Active Problem List    Diagnosis Date Noted    Morbid obesity ([facility] [address] 08/23/2022    Coronary artery disease involving native coronary artery of native heart without angina pectoris 08/23/2022    NSVT (nonsustained ventricular tachycardia) 08/16/2022    Ventricular bigeminy 08/08/2022    Chronic renal disease, stage III [facility]) [338072] 06/27/2022    Pain in left knee 03/11/2019    Unilateral primary osteoarthritis, left knee 03/11/2019    Unilateral primary osteoarthritis, left knee 03/11/2019    Acute bronchitis 01/17/2017     Past Surgical History:   Procedure Laterality Date    CARPAL TUNNEL RELEASE      COLONOSCOPY      KNEE ARTHROPLASTY Left     VASECTOMY       Family History   Problem Relation Age of Onset    Cancer Mother         uterine, breast, skin    Stroke Brother      Social History     Socioeconomic History    Marital status:      Spouse name: None    Number of children: None    Years of education: None    Highest education level: None Tobacco Use    Smoking status: Never    Smokeless tobacco: Never    Tobacco comments:     cigars, weekly   Vaping Use    Vaping Use: Never used   Substance and Sexual Activity    Alcohol use: Yes     Alcohol/week: 0.0 standard drinks     Comment: rare    Drug use: No    Sexual activity: Not Currently     Social Determinants of Health     Financial Resource Strain: Low Risk     Difficulty of Paying Living Expenses: Not hard at all   Food Insecurity: No Food Insecurity    Worried About Running Out of Food in the Last Year: Never true    Ran Out of Food in the Last Year: Never true     No Known Allergies    Review of Systems   Constitutional:  Negative for fatigue and fever. HENT:  Negative for trouble swallowing and voice change. Eyes:  Negative for photophobia and visual disturbance. Respiratory:  Negative for choking and shortness of breath. Cardiovascular:  Negative for chest pain and palpitations. Gastrointestinal:  Negative for nausea and vomiting. Genitourinary:  Negative for decreased urine volume, testicular pain and urgency. Musculoskeletal:  Positive for arthralgias, back pain, gait problem and myalgias. Skin:  Negative for rash. Neurological:  Negative for tremors and syncope. Hematological:  Does not bruise/bleed easily. Psychiatric/Behavioral:  Positive for agitation. Negative for suicidal ideas. Vitals:    10/10/22 1302 10/10/22 1310   BP: (!) 160/102 (!) 152/94   Site: Left Upper Arm Right Upper Arm   Position: Sitting Sitting   Cuff Size: Large Adult Large Adult   Pulse: 79    Temp: 97.6 °F (36.4 °C)    TempSrc: Oral    SpO2: 96%    Weight: (!) 355 lb (161 kg)    Height: 6' (1.829 m)        Physical Exam  Constitutional:       Appearance: He is well-developed. HENT:      Head: Normocephalic. Eyes:      Conjunctiva/sclera: Conjunctivae normal.   Cardiovascular:      Rate and Rhythm: Regular rhythm.    Pulmonary:      Effort: Pulmonary effort is normal. No respiratory distress. Breath sounds: No wheezing. Abdominal:      General: There is no distension. Musculoskeletal:         General: Normal range of motion. Cervical back: Normal range of motion. Skin:     Coloration: Skin is not jaundiced. Neurological:      Mental Status: He is alert. Cranial Nerves: No cranial nerve deficit. Psychiatric:         Mood and Affect: Mood normal.      Assessment/Plan  Concha Hewitt was seen today for pre-op exam, medication refill and other. Diagnoses and all orders for this visit:    Hip pain, right    Pre-op evaluation    Need for influenza vaccination  -     Influenza, FLUAD, (age 72 y+), IM, Preservative Free, 0.5 mL    Medical cleared for hip surgery. No follow-ups on file. Cesilia Park MD   Vaccine Information Sheet, \"Influenza - Inactivated\"  given to Jose Daniel Grigsby, or parent/legal guardian of  Jose Daniel Grigsby and verbalized understanding. Patient responses:    Have you ever had a reaction to a flu vaccine? No  Do you have any current illness? No  Have you ever had Guillian Gladstone Syndrome? No  Do you have a serious allergy to any of the follow: Neomycin, Polymyxin, Thimerosal, eggs or egg products? No    Flu vaccine given per order. Please see immunization tab. Risks and benefits explained. Current VIS given.

## 2022-10-20 ENCOUNTER — TELEPHONE (OUTPATIENT)
Dept: CT IMAGING | Age: 72
End: 2022-10-20

## 2022-10-20 ENCOUNTER — TELEPHONE (OUTPATIENT)
Dept: PRIMARY CARE CLINIC | Age: 72
End: 2022-10-20

## 2022-10-24 DIAGNOSIS — G47.33 OSA (OBSTRUCTIVE SLEEP APNEA): Primary | ICD-10-CM

## 2022-11-01 RX ORDER — IBUPROFEN 800 MG/1
800 TABLET ORAL 2 TIMES DAILY PRN
Qty: 180 TABLET | Refills: 0 | Status: SHIPPED | OUTPATIENT
Start: 2022-11-01

## 2022-11-04 ENCOUNTER — TELEPHONE (OUTPATIENT)
Dept: GASTROENTEROLOGY | Age: 72
End: 2022-11-04

## 2023-01-09 ENCOUNTER — OFFICE VISIT (OUTPATIENT)
Dept: PRIMARY CARE CLINIC | Age: 73
End: 2023-01-09

## 2023-01-09 VITALS
WEIGHT: 315 LBS | HEART RATE: 69 BPM | TEMPERATURE: 97.5 F | BODY MASS INDEX: 42.66 KG/M2 | SYSTOLIC BLOOD PRESSURE: 138 MMHG | HEIGHT: 72 IN | DIASTOLIC BLOOD PRESSURE: 88 MMHG | OXYGEN SATURATION: 96 %

## 2023-01-09 DIAGNOSIS — I10 ESSENTIAL HYPERTENSION: Primary | ICD-10-CM

## 2023-01-09 DIAGNOSIS — M25.571 CHRONIC ANKLE PAIN, BILATERAL: ICD-10-CM

## 2023-01-09 DIAGNOSIS — M25.551 HIP PAIN, RIGHT: ICD-10-CM

## 2023-01-09 DIAGNOSIS — K21.9 GASTROESOPHAGEAL REFLUX DISEASE WITHOUT ESOPHAGITIS: ICD-10-CM

## 2023-01-09 DIAGNOSIS — M25.572 CHRONIC ANKLE PAIN, BILATERAL: ICD-10-CM

## 2023-01-09 DIAGNOSIS — G89.29 CHRONIC ANKLE PAIN, BILATERAL: ICD-10-CM

## 2023-01-09 DIAGNOSIS — N18.31 STAGE 3A CHRONIC KIDNEY DISEASE (HCC): ICD-10-CM

## 2023-01-09 DIAGNOSIS — I50.22 CHRONIC SYSTOLIC (CONGESTIVE) HEART FAILURE (HCC): ICD-10-CM

## 2023-01-09 RX ORDER — AMLODIPINE BESYLATE 5 MG/1
5 TABLET ORAL DAILY
Qty: 90 TABLET | Refills: 3 | Status: SHIPPED | OUTPATIENT
Start: 2023-01-09

## 2023-01-09 RX ORDER — IBUPROFEN 800 MG/1
800 TABLET ORAL 2 TIMES DAILY PRN
Qty: 180 TABLET | Refills: 2 | Status: SHIPPED | OUTPATIENT
Start: 2023-01-09

## 2023-01-09 RX ORDER — OMEPRAZOLE 20 MG/1
20 CAPSULE, DELAYED RELEASE ORAL DAILY
Qty: 90 CAPSULE | Refills: 3 | Status: SHIPPED | OUTPATIENT
Start: 2023-01-09

## 2023-01-09 RX ORDER — TRAMADOL HYDROCHLORIDE 50 MG/1
50 TABLET ORAL EVERY 6 HOURS PRN
Qty: 120 TABLET | Refills: 2 | Status: SHIPPED | OUTPATIENT
Start: 2023-01-09 | End: 2023-04-09

## 2023-01-09 ASSESSMENT — PATIENT HEALTH QUESTIONNAIRE - PHQ9
SUM OF ALL RESPONSES TO PHQ QUESTIONS 1-9: 0
SUM OF ALL RESPONSES TO PHQ9 QUESTIONS 1 & 2: 0
SUM OF ALL RESPONSES TO PHQ QUESTIONS 1-9: 0
SUM OF ALL RESPONSES TO PHQ QUESTIONS 1-9: 0
1. LITTLE INTEREST OR PLEASURE IN DOING THINGS: 0
2. FEELING DOWN, DEPRESSED OR HOPELESS: 0
SUM OF ALL RESPONSES TO PHQ QUESTIONS 1-9: 0

## 2023-01-09 ASSESSMENT — ENCOUNTER SYMPTOMS
NAUSEA: 0
HEARTBURN: 1
VOICE CHANGE: 0
PHOTOPHOBIA: 0
TROUBLE SWALLOWING: 0
ABDOMINAL DISTENTION: 0
CHOKING: 0
VOMITING: 0
SHORTNESS OF BREATH: 0

## 2023-01-09 NOTE — PROGRESS NOTES
Alen Del Toro 81 y.o. male presents today with   Chief Complaint   Patient presents with    3 Month Follow-Up    Other      [place] and Diagnosis [place]   [facility] [place]  Ventricular tachycardia   CMS-:  Stage 3a chronic kidney disease ([facility] [address]          Ankle Pain   Incident onset: YEARS. The incident occurred at home. Injury mechanism: OBESE. The pain is present in the left ankle and right ankle. The quality of the pain is described as aching. The pain is at a severity of 3/10. The pain is moderate. The pain has been Fluctuating since onset. Pertinent negatives include no inability to bear weight. Hypertension  This is a chronic problem. The current episode started more than 1 year ago. The problem has been waxing and waning since onset. The problem is controlled. Associated symptoms include anxiety. Pertinent negatives include no chest pain, headaches, palpitations or shortness of breath. Gastroesophageal Reflux  He complains of heartburn. He reports no chest pain, no choking or no nausea. This is a recurrent problem. The current episode started more than 1 month ago. The problem occurs frequently. The problem has been waxing and waning. Pertinent negatives include no fatigue. Congestive Heart Failure  Presents for follow-up visit. Pertinent negatives include no chest pain, fatigue, palpitations or shortness of breath. The symptoms have been stable.      Past Medical History:   Diagnosis Date    Coronary artery disease involving native coronary artery of native heart without angina pectoris 08/23/2022    Degenerative arthritis of knee, bilateral     GERD (gastroesophageal reflux disease)     Hip arthritis     bilateral    Hyperlipidemia     Hypertension     Morbid obesity ([facility] [address] 08/23/2022    Osteoarthritis of right thumb     Sleep apnea, obstructive 2022    cpap 9 cm     Patient Active Problem List    Diagnosis Date Noted    Chronic systolic (congestive) heart failure 01/09/2023    DAVID (obstructive sleep apnea) 10/10/2022    Morbid obesity ([facility] [address] 08/23/2022    Coronary artery disease involving native coronary artery of native heart without angina pectoris 08/23/2022    NSVT (nonsustained ventricular tachycardia) 08/16/2022    Ventricular bigeminy 08/08/2022    Chronic renal disease, stage III [facility]) [589122] 06/27/2022    Pain in left knee 03/11/2019    Unilateral primary osteoarthritis, left knee 03/11/2019    Unilateral primary osteoarthritis, left knee 03/11/2019    Acute bronchitis 01/17/2017     Past Surgical History:   Procedure Laterality Date    CARPAL TUNNEL RELEASE      COLONOSCOPY      KNEE ARTHROPLASTY Left     VASECTOMY       Family History   Problem Relation Age of Onset    Cancer Mother         uterine, breast, skin    Stroke Brother      Social History     Socioeconomic History    Marital status:      Spouse name: None    Number of children: None    Years of education: None    Highest education level: None   Tobacco Use    Smoking status: Never    Smokeless tobacco: Never    Tobacco comments:     cigars, weekly   Vaping Use    Vaping Use: Never used   Substance and Sexual Activity    Alcohol use: Yes     Alcohol/week: 0.0 standard drinks     Comment: rare    Drug use: No    Sexual activity: Not Currently     Social Determinants of Health     Financial Resource Strain: Low Risk     Difficulty of Paying Living Expenses: Not hard at all   Food Insecurity: No Food Insecurity    Worried About Running Out of Food in the Last Year: Never true    Ran Out of Food in the Last Year: Never true     No Known Allergies    Review of Systems   Constitutional:  Negative for fatigue and fever. HENT:  Negative for trouble swallowing and voice change. Eyes:  Negative for photophobia and visual disturbance. Respiratory:  Negative for choking and shortness of breath. Cardiovascular:  Negative for chest pain and palpitations. Gastrointestinal:  Positive for heartburn.  Negative for abdominal distention, nausea and vomiting. Genitourinary:  Negative for decreased urine volume, testicular pain and urgency. Musculoskeletal:  Positive for arthralgias and myalgias. Skin:  Negative for rash. Neurological:  Negative for tremors, syncope and headaches. Hematological:  Does not bruise/bleed easily. Psychiatric/Behavioral:  Negative for agitation and suicidal ideas. Vitals:    01/09/23 1254   BP: (!) 138/90   Site: Right Upper Arm   Position: Sitting   Cuff Size: Large Adult   Pulse: 69   Temp: 97.5 °F (36.4 °C)   TempSrc: Temporal   SpO2: 96%   Weight: (!) 358 lb (162.4 kg)   Height: 6' (1.829 m)       Physical Exam  Constitutional:       Appearance: He is well-developed. HENT:      Head: Normocephalic. Eyes:      Conjunctiva/sclera: Conjunctivae normal.   Cardiovascular:      Rate and Rhythm: Regular rhythm. Pulmonary:      Effort: Pulmonary effort is normal. No respiratory distress. Breath sounds: No wheezing. Abdominal:      General: There is no distension. Musculoskeletal:         General: Normal range of motion. Cervical back: Normal range of motion. Skin:     Coloration: Skin is not jaundiced. Neurological:      Mental Status: He is alert. Cranial Nerves: No cranial nerve deficit. Psychiatric:         Mood and Affect: Mood normal.      Assessment/Plan  Denisa Carr was seen today for 3 month follow-up and other. Diagnoses and all orders for this visit:    Essential hypertension  -     amLODIPine (NORVASC) 5 MG tablet; Take 1 tablet by mouth daily    Gastroesophageal reflux disease without esophagitis  -     omeprazole (PRILOSEC) 20 MG delayed release capsule; Take 1 capsule by mouth Daily    Chronic ankle pain, bilateral  -     ibuprofen (ADVIL;MOTRIN) 800 MG tablet; Take 1 tablet by mouth 2 times daily as needed for Pain  -     traMADol (ULTRAM) 50 MG tablet; Take 1 tablet by mouth every 6 hours as needed for Pain for up to 90 days.  Max Daily Amount: 200 mg    Body mass index (BMI) 45.0-49.9, adult (HCC)    Chronic systolic (congestive) heart failure  stable  Stage 3a chronic kidney disease (HCC)   stable  Hip pain, right   Stable after surgery    Return in about 6 months (around 7/9/2023), or if symptoms worsen or fail to improve.    Mati Gonzalez MD

## 2023-02-04 RX ORDER — CARVEDILOL 3.12 MG/1
3.12 TABLET ORAL 2 TIMES DAILY
Qty: 180 TABLET | Refills: 3 | Status: SHIPPED | OUTPATIENT
Start: 2023-02-04

## 2023-03-14 ENCOUNTER — OFFICE VISIT (OUTPATIENT)
Dept: CARDIOLOGY CLINIC | Age: 73
End: 2023-03-14
Payer: MEDICARE

## 2023-03-14 VITALS
DIASTOLIC BLOOD PRESSURE: 100 MMHG | WEIGHT: 315 LBS | SYSTOLIC BLOOD PRESSURE: 160 MMHG | HEART RATE: 70 BPM | HEIGHT: 72 IN | BODY MASS INDEX: 42.66 KG/M2 | OXYGEN SATURATION: 95 %

## 2023-03-14 DIAGNOSIS — I47.29 NSVT (NONSUSTAINED VENTRICULAR TACHYCARDIA): ICD-10-CM

## 2023-03-14 DIAGNOSIS — E66.01 MORBID OBESITY (HCC): ICD-10-CM

## 2023-03-14 DIAGNOSIS — I25.10 CORONARY ARTERY DISEASE INVOLVING NATIVE CORONARY ARTERY OF NATIVE HEART WITHOUT ANGINA PECTORIS: Primary | ICD-10-CM

## 2023-03-14 DIAGNOSIS — I50.22 CHRONIC SYSTOLIC (CONGESTIVE) HEART FAILURE (HCC): ICD-10-CM

## 2023-03-14 DIAGNOSIS — G47.33 OSA (OBSTRUCTIVE SLEEP APNEA): ICD-10-CM

## 2023-03-14 DIAGNOSIS — I49.8 VENTRICULAR BIGEMINY: ICD-10-CM

## 2023-03-14 PROCEDURE — 1123F ACP DISCUSS/DSCN MKR DOCD: CPT | Performed by: INTERNAL MEDICINE

## 2023-03-14 PROCEDURE — 93000 ELECTROCARDIOGRAM COMPLETE: CPT | Performed by: INTERNAL MEDICINE

## 2023-03-14 PROCEDURE — 99213 OFFICE O/P EST LOW 20 MIN: CPT | Performed by: INTERNAL MEDICINE

## 2023-03-14 NOTE — PROGRESS NOTES
Chief Complaint   Patient presents with    [address] [facility] Stay  Narrative of Hospital Course:  Patient presented with lightheadedness dizziness, shortness of breath. He was noted to be bradycardic in the emergency department apparently with multiple PVCs. On telemetry he was noted to have nonsustained VT runs that were short and asymptomatic. Patient was noted to be in acute kidney injury as well as hyperkalemia on admission. Lisinopril and HCTZ were discontinued. He underwent cardiac catheterization showing mild CAD and normal LV function. He was continued on Norvasc 5 mg daily, Coreg 3.125 mg was added due to frequent PVCs nonsustained VT. It was thought his bradycardia was due to multiple PVCs/bigeminy. He will be placed on the 21-day event monitor and follow-up for further recommendations/treatment. Follow-up with PCP. Total discharge time spent greater than 30 minutes      8-23-22: Patient presents for initial medical evaluation. Patient is followed on a regular basis by Dr. Belén Walls MD. patient is status post hospitalization for lightheadedness dizziness and shortness of breath. He was noted to have multiple PVCs. He was also noted to be bradycardic but most likely due to frequent PVCs/bigeminy. He underwent cardiac catheterization showing mild CAD with normal LV function. Most recent echocardiogram in May 5, 2022 ejection fraction of 65%, no significant valve abnormality. His hydrochlorothiazide and lisinopril was discontinued. Patient will have a 21-day event monitor placed. He has been feeling good and no further episodes of lightheadedness dizziness or shortness of breath or syncope. 3/14/2023: Patient states he is doing well overall. No new issues. No hospitalization. He underwent cardiac catheterization showing mild CAD with normal LV function. Most recent echocardiogram in May 5, 2022 ejection fraction of 65%, no significant valve abnormality.   His hydrochlorothiazide and lisinopril was discontinued. Patient did have a 21-day event monitor with no evidence of any malignant arrhythmias. Did have short runs of PSVT. Since medication change he has not had any symptoms of lightheadedness dizziness or any syncope  HX OF DAVID. EKG with first-degree AV block left anterior fascicular block nonspecific ST changes  No    Patient Active Problem List   Diagnosis    Acute bronchitis    Pain in left knee    Unilateral primary osteoarthritis, left knee    Unilateral primary osteoarthritis, left knee    Chronic renal disease, stage III ([facility]) [004144]    Ventricular bigeminy    NSVT (nonsustained ventricular tachycardia)    Morbid obesity ([facility])    Coronary artery disease involving native coronary artery of native heart without angina pectoris    DAVID (obstructive sleep apnea)    Chronic systolic (congestive) heart failure       Past Surgical History:   Procedure Laterality Date    CARPAL TUNNEL RELEASE      COLONOSCOPY      KNEE ARTHROPLASTY Left     VASECTOMY         Social History     Socioeconomic History    Marital status:    Tobacco Use    Smoking status: Never    Smokeless tobacco: Never    Tobacco comments:     cigars, weekly   Vaping Use    Vaping Use: Never used   Substance and Sexual Activity    Alcohol use:  Yes     Alcohol/week: 0.0 standard drinks     Comment: rare    Drug use: No    Sexual activity: Not Currently     Social Determinants of Health     Financial Resource Strain: Low Risk     Difficulty of Paying Living Expenses: Not hard at all   Food Insecurity: No Food Insecurity    Worried About Running Out of Food in the Last Year: Never true    Ran Out of Food in the Last Year: Never true       Family History   Problem Relation Age of Onset    Cancer Mother         uterine, breast, skin    Stroke Brother        Current Outpatient Medications   Medication Sig Dispense Refill    amLODIPine (NORVASC) 5 MG tablet Take 1 tablet by mouth daily 90 tablet 3 ibuprofen (ADVIL;MOTRIN) 800 MG tablet Take 1 tablet by mouth 2 times daily as needed for Pain 180 tablet 2    omeprazole (PRILOSEC) 20 MG delayed release capsule Take 1 capsule by mouth Daily 90 capsule 3    traMADol (ULTRAM) 50 MG tablet Take 1 tablet by mouth every 6 hours as needed for Pain for up to 90 days. Max Daily Amount: 200 mg 120 tablet 2    Respiratory Therapy Supplies ALEXIA 1 Device by Does not apply route every 3 hours as needed (sleep apnea) 1 each 0    cyclobenzaprine (FLEXERIL) 10 MG tablet Take 1 tablet by mouth 2 times daily as needed for Muscle spasms 20 tablet 0    carvedilol (COREG) 3.125 MG tablet Take 1 tablet by mouth 2 times daily (Patient not taking: Reported on 3/14/2023) 180 tablet 3     No current facility-administered medications for this visit. Patient has no known allergies. Review of Systems:  General ROS: negative  Psychological ROS: negative  Hematological and Lymphatic ROS: No history of blood clots or bleeding disorder. Respiratory ROS: no cough, shortness of breath, or wheezing  Cardiovascular ROS: no chest pain or dyspnea on exertion  Gastrointestinal ROS: no abdominal pain, change in bowel habits, or black or bloody stools  Genito-Urinary ROS: no dysuria, trouble voiding, or hematuria  Musculoskeletal ROS: negative  Neurological ROS: no TIA or stroke symptoms  Dermatological ROS: negative    VITALS:  Blood pressure (!) 160/100, pulse 70, height 6' (1.829 m), weight (!) 368 lb 3.2 oz (167 kg), SpO2 95 %. Body mass index is 49.94 kg/m². Physical Examination:  General appearance - alert, well appearing, and in no distress  Mental status - alert, oriented to person, place, and time  Neck - Neck is supple, no JVD or carotid bruits. No thyromegaly or adenopathy.    Chest - clear to auscultation, no wheezes, rales or rhonchi, symmetric air entry  Heart - normal rate, regular rhythm, normal S1, S2, no murmurs, rubs, clicks or gallops  Abdomen - soft, nontender, nondistended, no masses or organomegaly  Neurological - alert, oriented, normal speech, no focal findings or movement disorder noted  Extremities - peripheral pulses normal, no pedal edema, no clubbing or cyanosis  Skin - normal coloration and turgor, no rashes, no suspicious skin lesions noted      EKG: normal sinus rhythm, first degree AVB. Orders Placed This Encounter   Procedures    EKG 12 lead         ASSESSMENT:     Diagnosis Orders   1. Coronary artery disease involving native coronary artery of native heart without angina pectoris  EKG 12 lead      2. Chronic systolic (congestive) heart failure        3. NSVT (nonsustained ventricular tachycardia)        4. Morbid obesity ([address]        5. DAVID (obstructive sleep apnea)        6. Ventricular bigeminy              PLAN:       As always, aggressive risk factor modification is strongly recommended. We should adhere to the JNC VIII guidelines for HTN management and the NCEP ATP III guidelines for LDL-C management. Cardiac diet is always recommended with low fat, cholesterol, calories and sodium. Continue medications at current doses. Check EKG     Patient is considered a low to intermediate risk patient for proposed orthopedic surgery at this time. No active issues such as acute angina, congestive heart failure or uncontrolled arrhythmias. Patient was advised and encouraged to check blood pressure at home or at a pharmacy, maintain a logbook, and also call us back if blood pressure are above the target ranges or if it is low. Patient clearly understands and agrees to the instructions. We will need to continue to monitor muscle and liver enzymes, BUN, CR, and electrolytes.

## 2023-05-03 ENCOUNTER — TELEPHONE (OUTPATIENT)
Dept: PHARMACY | Facility: CLINIC | Age: 73
End: 2023-05-03

## 2023-05-11 NOTE — TELEPHONE ENCOUNTER
Noted provider reviewed/doned encounter.  Will close.    =======================================================   For Pharmacy Admin Tracking Only    Program: [address] in place:  No  Recommendation Provided To: Provider: 1 via Note to Provider  Intervention Detail: New Rx: 1, reason: Needs Additional Therapy  Intervention Accepted By: Provider: 0  Gap Closed?: No   Time Spent (min): 15

## 2023-07-10 ENCOUNTER — TELEPHONE (OUTPATIENT)
Dept: PHARMACY | Facility: CLINIC | Age: 73
End: 2023-07-10

## 2023-07-10 NOTE — TELEPHONE ENCOUNTER
Bernice Pryor MD, appointment with you 7/11/23 - identified with a care gap for Cardiovascular Disease without a Statin   - Would patient benefit from statin therapy, such as rosuvastatin 5mg daily? (with increase as tolerated/appropriate)  73yom with CAD, LDL >100  at least one-time fill of atorvastatin 20mg daily - unclear per chart, why stopped? Please let me know if I can further assist, or if you'd like me to outreach to patient to review further. Thank you,  Abelina Peabody, PharmD, [address] [place] [address], toll free: [phone], option 1    ===============================================================================    POPULATION HEALTH CLINICAL PHARMACY: STATIN THERAPY REVIEW  Identified statin use in persons with cardiovascular disease care gap per [employer]. Records dated: 6/23/23. Last Visit: 1/9/23 with PCP; 3/14/23 with cardiology  Next Visit: 7/11/23 with PCP; 3/12/24 with cardiology    Patient not found in Outcomes MTM    ASSESSMENT of Statin in Persons with [address]  has been identified as having a diagnosis for clinical ASCVD or event (e.g., inpatient hospitalization for MI, CABG, PCI or other revascularization procedures) in the measurement year and not currently filling a moderate or high intensity statin. Patients included in this care gap are males age 18-72 and females age 37-78. ASCVD: Coronary artery disease involving native coronary artery of native heart without angina pectoris (recognized as Atherosclerotic Heart Disease Of Native Coronary Artery Without Angina Pectoris dx per 3/14/23 claim)     Currently Prescribed a statin: no     Per Reconcile Dispense History: atorvastatin 20mg daily filled on 1/10/13 for 90 day supply. Per chart review:  Per 3/14/23 cardio OV note: \"mild CAD . .. aggressive risk factor modification is strongly recommended. We should adhere to . ..  the NCEP

## 2023-07-12 NOTE — TELEPHONE ENCOUNTER
Noted PCP routing comment also re \"ask patient\". Appt rescheduled to 7/25/23. Attempting to reach patient to review. Left message asking for return call. - to review:  Appears distant rx (2013/2014?) for atorvastatin - does he recall if he took, or why stopped?    CAD hx and 10-year risk of heart event is 35% - would likely benefit from statin therapy  PCP appt 7/25/23

## 2023-07-18 NOTE — TELEPHONE ENCOUNTER
Patient returning call - thinks atorvastatin was stopped in past d/t no longer being needed. Does not recall any adverse effects from it. Patient cites recent cath being \"clear of any blockages\". Briefly reviewed his 10-year ASCVD risk score and statin consideration - patient states he'll further review with PCP at appointment next week.

## 2023-07-21 ENCOUNTER — OFFICE VISIT (OUTPATIENT)
Dept: PRIMARY CARE CLINIC | Age: 73
End: 2023-07-21

## 2023-07-21 VITALS
SYSTOLIC BLOOD PRESSURE: 122 MMHG | WEIGHT: 315 LBS | DIASTOLIC BLOOD PRESSURE: 72 MMHG | TEMPERATURE: 98 F | BODY MASS INDEX: 42.66 KG/M2 | OXYGEN SATURATION: 95 % | RESPIRATION RATE: 18 BRPM | HEIGHT: 72 IN | HEART RATE: 74 BPM

## 2023-07-21 DIAGNOSIS — M25.532 ACUTE PAIN OF LEFT WRIST: Primary | ICD-10-CM

## 2023-07-21 DIAGNOSIS — Z00.00 MEDICARE ANNUAL WELLNESS VISIT, SUBSEQUENT: Primary | ICD-10-CM

## 2023-07-21 DIAGNOSIS — Z00.00 HEALTH CARE MAINTENANCE: ICD-10-CM

## 2023-07-21 RX ORDER — PREDNISONE 20 MG/1
20 TABLET ORAL DAILY
Qty: 7 TABLET | Refills: 0 | Status: SHIPPED | OUTPATIENT
Start: 2023-07-21 | End: 2023-07-28

## 2023-07-21 RX ORDER — TRAMADOL HYDROCHLORIDE 50 MG/1
50 TABLET ORAL EVERY 6 HOURS PRN
COMMUNITY
Start: 2023-05-01

## 2023-07-21 SDOH — ECONOMIC STABILITY: FOOD INSECURITY: WITHIN THE PAST 12 MONTHS, YOU WORRIED THAT YOUR FOOD WOULD RUN OUT BEFORE YOU GOT MONEY TO BUY MORE.: NEVER TRUE

## 2023-07-21 SDOH — ECONOMIC STABILITY: INCOME INSECURITY: HOW HARD IS IT FOR YOU TO PAY FOR THE VERY BASICS LIKE FOOD, HOUSING, MEDICAL CARE, AND HEATING?: NOT HARD AT ALL

## 2023-07-21 SDOH — ECONOMIC STABILITY: FOOD INSECURITY: WITHIN THE PAST 12 MONTHS, THE FOOD YOU BOUGHT JUST DIDN'T LAST AND YOU DIDN'T HAVE MONEY TO GET MORE.: NEVER TRUE

## 2023-07-21 SDOH — ECONOMIC STABILITY: HOUSING INSECURITY
IN THE LAST 12 MONTHS, WAS THERE A TIME WHEN YOU DID NOT HAVE A STEADY PLACE TO SLEEP OR SLEPT IN A SHELTER (INCLUDING NOW)?: NO

## 2023-07-21 ASSESSMENT — COLUMBIA-SUICIDE SEVERITY RATING SCALE - C-SSRS
2. HAVE YOU ACTUALLY HAD ANY THOUGHTS OF KILLING YOURSELF?: NO
1. WITHIN THE PAST MONTH, HAVE YOU WISHED YOU WERE DEAD OR WISHED YOU COULD GO TO SLEEP AND NOT WAKE UP?: NO
6. HAVE YOU EVER DONE ANYTHING, STARTED TO DO ANYTHING, OR PREPARED TO DO ANYTHING TO END YOUR LIFE?: NO

## 2023-07-21 ASSESSMENT — PATIENT HEALTH QUESTIONNAIRE - PHQ9
8. MOVING OR SPEAKING SO SLOWLY THAT OTHER PEOPLE COULD HAVE NOTICED. OR THE OPPOSITE, BEING SO FIGETY OR RESTLESS THAT YOU HAVE BEEN MOVING AROUND A LOT MORE THAN USUAL: 0
4. FEELING TIRED OR HAVING LITTLE ENERGY: 0
9. THOUGHTS THAT YOU WOULD BE BETTER OFF DEAD, OR OF HURTING YOURSELF: 0
SUM OF ALL RESPONSES TO PHQ QUESTIONS 1-9: 0
3. TROUBLE FALLING OR STAYING ASLEEP: 0
5. POOR APPETITE OR OVEREATING: 0
SUM OF ALL RESPONSES TO PHQ9 QUESTIONS 1 & 2: 0
7. TROUBLE CONCENTRATING ON THINGS, SUCH AS READING THE NEWSPAPER OR WATCHING TELEVISION: 0
SUM OF ALL RESPONSES TO PHQ QUESTIONS 1-9: 0
6. FEELING BAD ABOUT YOURSELF - OR THAT YOU ARE A FAILURE OR HAVE LET YOURSELF OR YOUR FAMILY DOWN: 0
SUM OF ALL RESPONSES TO PHQ QUESTIONS 1-9: 0
1. LITTLE INTEREST OR PLEASURE IN DOING THINGS: 0
SUM OF ALL RESPONSES TO PHQ QUESTIONS 1-9: 0
10. IF YOU CHECKED OFF ANY PROBLEMS, HOW DIFFICULT HAVE THESE PROBLEMS MADE IT FOR YOU TO DO YOUR WORK, TAKE CARE OF THINGS AT HOME, OR GET ALONG WITH OTHER PEOPLE: 0
2. FEELING DOWN, DEPRESSED OR HOPELESS: 0

## 2023-07-21 NOTE — PROGRESS NOTES
Medicare Annual Wellness Visit    Андрей Lindo is here for Medicare AWV    Assessment & Plan   Medicare annual wellness visit, subsequent    Recommendations for Preventive Services Due: see orders and patient instructions/AVS.  Recommended screening schedule for the next 5-10 years is provided to the patient in written form: see Patient Instructions/AVS.     No follow-ups on file. Subjective       Patient's complete Health Risk Assessment and screening values have been reviewed and are found in Flowsheets. The following problems were reviewed today and where indicated follow up appointments were made and/or referrals ordered. Positive Risk Factor Screenings with Interventions:                Opioid Risk: (Low risk score <55) Opioid risk score: 9    Patient is low risk for opioid use disorder or overdose. Last PDMP Helio Borges as Reviewed:  Review User Review Instant Review Result   RUBEN HANCOCK 1/9/2023  1:10 PM     Reviewed PDMP [1]       General HRA Questions:  Select all that apply: (!) New or Increased Pain    Pain Interventions:  Patient comments: wrist pain management in the acute encounter today     Weight and Activity:  Physical Activity: Sufficiently Active    Days of Exercise per Week: 3 days    Minutes of Exercise per Session: 60 min     On average, how many days per week do you engage in moderate to strenuous exercise (like a brisk walk)?: 3 days  Have you lost any weight without trying in the past 3 months?: No  There is no height or weight on file to calculate BMI.  (!) Abnormal  Obesity Interventions:  low carbohydrate diet, exercise for at least 150 minutes/week          Dentist Screen:  Have you seen the dentist within the past year?: (!) No    Intervention:  Advised to schedule with their dentist      Safety:  Do you have either shower bars, grab bars, non-slip mats or non-slip surfaces in your shower or bathtub?: (!) No  Interventions:  Patient comments: will install suction grab bars

## 2023-07-21 NOTE — PATIENT INSTRUCTIONS
[phone] or search [employer] on [address] [place] [address]. You need 0514-9859 mg of calcium and 2419-4747 IU of vitamin D per day. It is possible to meet your calcium requirement with diet alone, but a vitamin D supplement is usually necessary to meet this goal.  When exposed to the sun, use a sunscreen that protects against both UVA and UVB radiation with an SPF of 30 or greater. Reapply every 2 to 3 hours or after sweating, drying off with a towel, or swimming. Always wear a seat belt when traveling in a car. Always wear a helmet when riding a bicycle or motorcycle.

## 2023-07-21 NOTE — PROGRESS NOTES
Subjective:      Patient ID: Melany Leung is a 72 y.o. male    Wrist pain x 1 day   HPI  Pt presents with 1 day of gradual onset of left wrist and hand pain rated 8/10, nonradiating rated 8/10. Attests to lifting heavy objects the day before. No tingling or numbness, no hand weakness. No known hx gout. Attests to red meat consumption, no regular shellfish consumption. Past Medical History:   Diagnosis Date    Coronary artery disease involving native coronary artery of native heart without angina pectoris 08/23/2022    Degenerative arthritis of knee, bilateral     GERD (gastroesophageal reflux disease)     Hip arthritis     bilateral    Hyperlipidemia     Hypertension     Morbid obesity ([address]) 08/23/2022    Osteoarthritis of right thumb     Sleep apnea, obstructive 2022    cpap 9 cm     Past Surgical History:   Procedure Laterality Date    CARPAL TUNNEL RELEASE      COLONOSCOPY      KNEE ARTHROPLASTY Left     VASECTOMY       Social History     Socioeconomic History    Marital status:      Spouse name: Not on file    Number of children: Not on file    Years of education: Not on file    Highest education level: Not on file   Occupational History    Not on file   Tobacco Use    Smoking status: Never    Smokeless tobacco: Never    Tobacco comments:     cigars, weekly   Vaping Use    Vaping Use: Never used   Substance and Sexual Activity    Alcohol use: Yes     Alcohol/week: 0.0 standard drinks     Comment: rare    Drug use: No    Sexual activity: Not Currently   Other Topics Concern    Not on file   Social History Narrative    Not on file     Social Determinants of Health     Financial Resource Strain: Low Risk     Difficulty of Paying Living Expenses: Not hard at all   Food Insecurity: No Food Insecurity    Worried About [place] in the Last Year: Never true    [address] in the Last Year: Never true   Transportation Needs: Unknown    Lack of Transportation (Medical):  Not on file    Lack of

## 2023-07-23 ASSESSMENT — ENCOUNTER SYMPTOMS
DIARRHEA: 0
VOMITING: 0
ABDOMINAL PAIN: 0
WHEEZING: 0
COUGH: 0
NAUSEA: 0
SHORTNESS OF BREATH: 0

## 2023-07-24 NOTE — TELEPHONE ENCOUNTER
Filiberto Pena MD, appointment with you 7/25/23 - identified with a care gap for Cardiovascular Disease without a Statin   - Would patient benefit from statin therapy, such as rosuvastatin 5mg daily? (with increase as tolerated/appropriate)  73yom with CAD, LDL >100, 10-year ASCVD risk 35%  Atorvastatin rx in past (@5431/6562) - per patient, thinks it was stopped d/t no longer being needed. He does not recall any adverse effects. Please let me know if I can further assist, or if you'd like me to outreach to patient to review further.   Thank you,  Rosmery Dixon, PharmD, [address] [place] [address], toll free: [phone], option 1

## 2023-07-26 NOTE — TELEPHONE ENCOUNTER
Noted provider reviewed/doned encounter and appears appt rescheduled from yesterday to 8/28/23.  Will close this encounter at this time.    =======================================================   For Pharmacy Admin Tracking Only    Program: [address] in place:  No  Recommendation Provided To: Patient/Caregiver: 1 via Telephone  Intervention Detail: New Rx: 1, reason: Needs Additional Therapy  Intervention Accepted By: Patient/Caregiver: 0  Gap Closed?: No   Time Spent (min):  25

## 2023-08-14 DIAGNOSIS — Z00.00 HEALTH CARE MAINTENANCE: ICD-10-CM

## 2023-08-14 DIAGNOSIS — M25.532 ACUTE PAIN OF LEFT WRIST: ICD-10-CM

## 2023-08-14 LAB
HBA1C MFR BLD: 6.1 % (ref 4.8–5.9)
URATE SERPL-MCNC: 5.7 MG/DL (ref 3.4–7)

## 2023-08-18 LAB — HEPATITIS C ANTIBODY: NONREACTIVE

## 2023-08-19 DIAGNOSIS — R73.03 PREDIABETES: Primary | ICD-10-CM

## 2023-09-07 ENCOUNTER — OFFICE VISIT (OUTPATIENT)
Dept: PRIMARY CARE CLINIC | Age: 73
End: 2023-09-07
Payer: MEDICARE

## 2023-09-07 VITALS
WEIGHT: 315 LBS | HEIGHT: 72 IN | DIASTOLIC BLOOD PRESSURE: 88 MMHG | SYSTOLIC BLOOD PRESSURE: 162 MMHG | BODY MASS INDEX: 42.66 KG/M2 | OXYGEN SATURATION: 96 % | HEART RATE: 84 BPM

## 2023-09-07 DIAGNOSIS — G89.29 CHRONIC ANKLE PAIN, BILATERAL: ICD-10-CM

## 2023-09-07 DIAGNOSIS — M25.572 CHRONIC ANKLE PAIN, BILATERAL: ICD-10-CM

## 2023-09-07 DIAGNOSIS — M25.571 CHRONIC ANKLE PAIN, BILATERAL: ICD-10-CM

## 2023-09-07 PROCEDURE — 1123F ACP DISCUSS/DSCN MKR DOCD: CPT | Performed by: INTERNAL MEDICINE

## 2023-09-07 PROCEDURE — 99213 OFFICE O/P EST LOW 20 MIN: CPT | Performed by: INTERNAL MEDICINE

## 2023-09-07 RX ORDER — TRAMADOL HYDROCHLORIDE 50 MG/1
50 TABLET ORAL EVERY 6 HOURS PRN
Qty: 120 TABLET | Refills: 2 | Status: SHIPPED | OUTPATIENT
Start: 2023-09-07 | End: 2023-12-06

## 2023-09-07 NOTE — PROGRESS NOTES
Chu  72 y.o. male presents today with   Chief Complaint   Patient presents with    3 Month Follow-Up       Ankle Pain   The incident occurred more than 1 week ago. The incident occurred at home. There was no injury mechanism. The pain is present in the right ankle and left ankle. The pain is at a severity of 6/10. The pain is severe. The pain has been Worsening since onset. Pertinent negatives include no inability to bear weight or numbness.        Past Medical History:   Diagnosis Date    Coronary artery disease involving native coronary artery of native heart without angina pectoris 2022    Degenerative arthritis of knee, bilateral     GERD (gastroesophageal reflux disease)     Hip arthritis     bilateral    Hyperlipidemia     Hypertension     Morbid obesity ([address]) 2022    Osteoarthritis of right thumb     Sleep apnea, obstructive     cpap 9 cm     Patient Active Problem List    Diagnosis Date Noted    Chronic systolic (congestive) heart failure 2023    DAVID (obstructive sleep apnea) 10/10/2022    Morbid obesity ([address]) 2022    Coronary artery disease involving native coronary artery of native heart without angina pectoris 2022    NSVT (nonsustained ventricular tachycardia) ([address]) 2022    Ventricular bigeminy 2022    Chronic renal disease, stage III ([address]) [726200] 2022    Pain in left knee 2019    Unilateral primary osteoarthritis, left knee 2019    Unilateral primary osteoarthritis, left knee 2019    Acute bronchitis 2017     Past Surgical History:   Procedure Laterality Date    CARPAL TUNNEL RELEASE      COLONOSCOPY      KNEE ARTHROPLASTY Left     VASECTOMY       Family History   Problem Relation Age of Onset    Cancer Mother         uterine, breast, skin    Stroke Brother      Social History     Socioeconomic History    Marital status:      Spouse name: None    Number of children: None    Years of education: None    Highest

## 2023-09-13 ASSESSMENT — ENCOUNTER SYMPTOMS
VOMITING: 0
TROUBLE SWALLOWING: 0
VOICE CHANGE: 0
PHOTOPHOBIA: 0
CHOKING: 0
NAUSEA: 0
SHORTNESS OF BREATH: 0

## 2023-09-18 ENCOUNTER — OFFICE VISIT (OUTPATIENT)
Dept: PRIMARY CARE CLINIC | Age: 73
End: 2023-09-18
Payer: MEDICARE

## 2023-09-18 VITALS
DIASTOLIC BLOOD PRESSURE: 84 MMHG | RESPIRATION RATE: 18 BRPM | OXYGEN SATURATION: 95 % | SYSTOLIC BLOOD PRESSURE: 134 MMHG | HEIGHT: 72 IN | WEIGHT: 315 LBS | BODY MASS INDEX: 42.66 KG/M2 | TEMPERATURE: 98 F | HEART RATE: 73 BPM

## 2023-09-18 DIAGNOSIS — M79.641 PAIN OF RIGHT HAND: ICD-10-CM

## 2023-09-18 DIAGNOSIS — M79.89 SWELLING OF RIGHT HAND: Primary | ICD-10-CM

## 2023-09-18 PROCEDURE — 1123F ACP DISCUSS/DSCN MKR DOCD: CPT | Performed by: INTERNAL MEDICINE

## 2023-09-18 PROCEDURE — 99214 OFFICE O/P EST MOD 30 MIN: CPT | Performed by: INTERNAL MEDICINE

## 2023-09-18 RX ORDER — PREDNISONE 20 MG/1
40 TABLET ORAL DAILY
Qty: 14 TABLET | Refills: 0 | Status: SHIPPED | OUTPATIENT
Start: 2023-09-18 | End: 2023-09-25

## 2023-09-18 ASSESSMENT — ENCOUNTER SYMPTOMS
VOMITING: 0
SINUS PAIN: 0
NAUSEA: 0
SINUS PRESSURE: 0
COUGH: 0
RHINORRHEA: 0
WHEEZING: 0
SHORTNESS OF BREATH: 0
DIARRHEA: 0
ABDOMINAL PAIN: 0
SORE THROAT: 0

## 2023-09-18 NOTE — PROGRESS NOTES
Transportation     Lack of Transportation (Medical): Not on file     Lack of Transportation (Non-Medical): No   Physical Activity: Sufficiently Active (7/21/2023)    Exercise Vital Sign     Days of Exercise per Week: 3 days     Minutes of Exercise per Session: 60 min   Stress: Not on file   Social Connections: Not on file   Intimate Partner Violence: Not on file   Housing Stability: Unknown (7/21/2023)    Housing Stability Vital Sign     Unable to Pay for Housing in the Last Year: Not on file     Number of Places Lived in the Last Year: Not on file     Unstable Housing in the Last Year: No     Family History   Problem Relation Age of Onset    Cancer Mother         uterine, breast, skin    Stroke Brother      Allergies:  Patient has no known allergies. Patient Active Problem List   Diagnosis    Acute bronchitis    Pain in left knee    Unilateral primary osteoarthritis, left knee    Unilateral primary osteoarthritis, left knee    Chronic renal disease, stage III ([facility]) [260997]    Ventricular bigeminy    NSVT (nonsustained ventricular tachycardia) ([facility])    Morbid obesity ([facility])    Coronary artery disease involving native coronary artery of native heart without angina pectoris    DAVID (obstructive sleep apnea)    Chronic systolic (congestive) heart failure     Current Outpatient Medications on File Prior to Visit   Medication Sig Dispense Refill    traMADol (ULTRAM) 50 MG tablet Take 1 tablet by mouth every 6 hours as needed for Pain for up to 90 days. Max Daily Amount: 200 mg 120 tablet 2    traMADol (ULTRAM) 50 MG tablet Take 1 tablet by mouth every 6 hours as needed.       carvedilol (COREG) 3.125 MG tablet Take 1 tablet by mouth 2 times daily 180 tablet 3    amLODIPine (NORVASC) 5 MG tablet Take 1 tablet by mouth daily 90 tablet 3    ibuprofen (ADVIL;MOTRIN) 800 MG tablet Take 1 tablet by mouth 2 times daily as needed for Pain 180 tablet 2    omeprazole (PRILOSEC) 20 MG delayed release capsule Take 1 capsule by mouth

## 2023-09-26 ENCOUNTER — NURSE ONLY (OUTPATIENT)
Dept: PRIMARY CARE CLINIC | Age: 73
End: 2023-09-26
Payer: MEDICARE

## 2023-09-26 DIAGNOSIS — Z23 NEED FOR INFLUENZA VACCINATION: Primary | ICD-10-CM

## 2023-09-26 PROCEDURE — 90694 VACC AIIV4 NO PRSRV 0.5ML IM: CPT | Performed by: INTERNAL MEDICINE

## 2023-09-26 PROCEDURE — G0008 ADMIN INFLUENZA VIRUS VAC: HCPCS | Performed by: INTERNAL MEDICINE

## 2023-10-02 ENCOUNTER — OFFICE VISIT (OUTPATIENT)
Dept: ORTHOPEDIC SURGERY | Age: 73
End: 2023-10-02
Payer: MEDICARE

## 2023-10-02 DIAGNOSIS — M11.20 PSEUDOGOUT: ICD-10-CM

## 2023-10-02 DIAGNOSIS — M18.11 PRIMARY OSTEOARTHRITIS OF FIRST CARPOMETACARPAL JOINT OF RIGHT HAND: ICD-10-CM

## 2023-10-02 DIAGNOSIS — M79.89 SWELLING OF RIGHT HAND: Primary | ICD-10-CM

## 2023-10-02 PROCEDURE — 1123F ACP DISCUSS/DSCN MKR DOCD: CPT | Performed by: STUDENT IN AN ORGANIZED HEALTH CARE EDUCATION/TRAINING PROGRAM

## 2023-10-02 PROCEDURE — 99204 OFFICE O/P NEW MOD 45 MIN: CPT | Performed by: STUDENT IN AN ORGANIZED HEALTH CARE EDUCATION/TRAINING PROGRAM

## 2023-10-02 RX ORDER — NAPROXEN 500 MG/1
500 TABLET ORAL 2 TIMES DAILY WITH MEALS
Qty: 60 TABLET | Refills: 0 | Status: SHIPPED | OUTPATIENT
Start: 2023-10-02

## 2023-10-02 ASSESSMENT — ENCOUNTER SYMPTOMS
RESPIRATORY NEGATIVE: 1
GASTROINTESTINAL NEGATIVE: 1
ALLERGIC/IMMUNOLOGIC NEGATIVE: 1
EYES NEGATIVE: 1

## 2023-10-02 NOTE — PROGRESS NOTES
Orthopedic Surgery and Sports Medicine    Subjective:      Patient ID: Sivan Jorge is a 72 y.o. male who presents today for:  Chief Complaint   Patient presents with    New Patient     Here for exam for RT wrist/hand pain and swelling, has been ongoing for 2 weeks, says prednisone helped but swelling is back       HPI  Shirlene Ballesteros is a 77-year-old male who presents today for evaluation of his right hand/wrist swelling. This began approximately 3 weeks ago. He states that he had significant swelling of his contralateral left hand approximately 6 weeks ago and that resolved. But then it switched over to his right hand. He took a 7-day oral steroid which helped. However approximately 3 to 4 days ago the swelling started to return. It is not necessarily painful, but feels tight. He states that he was worked up for gout and his lab values were normal.  He has also been taking ibuprofen 800 mg. He denies any other joint issues throughout his body. He denies any recent fever or chills. Previous treatment: 7-day oral steroid, ibuprofen  NSAIDs: Yes, ibuprofen  Physical therapy: No    Hand Dominance: right  Occupation:   Workers Compensation:   Have you missed work for this issue? No  Is this issue being addressed under a worker's compensation claim?  No    Past Medical History:   Diagnosis Date    Coronary artery disease involving native coronary artery of native heart without angina pectoris 08/23/2022    Degenerative arthritis of knee, bilateral     GERD (gastroesophageal reflux disease)     Hip arthritis     bilateral    Hyperlipidemia     Hypertension     Morbid obesity ([address]) 08/23/2022    Osteoarthritis of right thumb     Sleep apnea, obstructive 2022    cpap 9 cm      Past Surgical History:   Procedure Laterality Date    CARPAL TUNNEL RELEASE      COLONOSCOPY      KNEE ARTHROPLASTY Left     VASECTOMY       Social History     Socioeconomic History    Marital status:      Spouse name: Not on file

## 2023-11-16 DIAGNOSIS — M25.571 CHRONIC ANKLE PAIN, BILATERAL: ICD-10-CM

## 2023-11-16 DIAGNOSIS — M25.572 CHRONIC ANKLE PAIN, BILATERAL: ICD-10-CM

## 2023-11-16 DIAGNOSIS — G89.29 CHRONIC ANKLE PAIN, BILATERAL: ICD-10-CM

## 2023-11-16 RX ORDER — IBUPROFEN 800 MG/1
TABLET ORAL
Qty: 180 TABLET | Refills: 2 | Status: SHIPPED | OUTPATIENT
Start: 2023-11-16

## 2024-01-18 RX ORDER — CARVEDILOL 3.12 MG/1
3.12 TABLET ORAL 2 TIMES DAILY
Qty: 180 TABLET | Refills: 2 | Status: SHIPPED | OUTPATIENT
Start: 2024-01-18

## 2024-02-13 ENCOUNTER — TELEPHONE (OUTPATIENT)
Dept: PRIMARY CARE CLINIC | Age: 74
End: 2024-02-13

## 2024-02-13 NOTE — TELEPHONE ENCOUNTER
omeprazole (PRILOSEC) 20 MG delayed release capsule  90 day supply         [facility] - [facility] - [facility] - P [phone] - F [phone]  [facility] [place] [address]  Phone: [phone]  Fax: [phone]

## 2024-02-14 DIAGNOSIS — K21.9 GASTROESOPHAGEAL REFLUX DISEASE WITHOUT ESOPHAGITIS: ICD-10-CM

## 2024-02-14 RX ORDER — OMEPRAZOLE 20 MG/1
20 CAPSULE, DELAYED RELEASE ORAL DAILY
Qty: 90 CAPSULE | Refills: 2 | Status: SHIPPED | OUTPATIENT
Start: 2024-02-14

## 2024-03-15 ENCOUNTER — OFFICE VISIT (OUTPATIENT)
Dept: PRIMARY CARE CLINIC | Age: 74
End: 2024-03-15
Payer: MEDICARE

## 2024-03-15 ENCOUNTER — OFFICE VISIT (OUTPATIENT)
Dept: PRIMARY CARE CLINIC | Age: 74
End: 2024-03-15

## 2024-03-15 VITALS
SYSTOLIC BLOOD PRESSURE: 154 MMHG | OXYGEN SATURATION: 98 % | BODY MASS INDEX: 49.5 KG/M2 | WEIGHT: 315 LBS | HEART RATE: 73 BPM | DIASTOLIC BLOOD PRESSURE: 88 MMHG

## 2024-03-15 VITALS
OXYGEN SATURATION: 98 % | BODY MASS INDEX: 49.5 KG/M2 | SYSTOLIC BLOOD PRESSURE: 155 MMHG | HEART RATE: 73 BPM | DIASTOLIC BLOOD PRESSURE: 84 MMHG | WEIGHT: 315 LBS

## 2024-03-15 DIAGNOSIS — M25.571 CHRONIC ANKLE PAIN, BILATERAL: ICD-10-CM

## 2024-03-15 DIAGNOSIS — R73.9 HYPERGLYCEMIA: ICD-10-CM

## 2024-03-15 DIAGNOSIS — I50.22 CHRONIC SYSTOLIC (CONGESTIVE) HEART FAILURE (HCC): ICD-10-CM

## 2024-03-15 DIAGNOSIS — G89.29 CHRONIC ANKLE PAIN, BILATERAL: ICD-10-CM

## 2024-03-15 DIAGNOSIS — N18.31 STAGE 3A CHRONIC KIDNEY DISEASE (HCC): ICD-10-CM

## 2024-03-15 DIAGNOSIS — I10 ESSENTIAL HYPERTENSION: Primary | ICD-10-CM

## 2024-03-15 DIAGNOSIS — I47.29 NSVT (NONSUSTAINED VENTRICULAR TACHYCARDIA) (HCC): ICD-10-CM

## 2024-03-15 DIAGNOSIS — M25.572 CHRONIC ANKLE PAIN, BILATERAL: ICD-10-CM

## 2024-03-15 DIAGNOSIS — R73.03 PREDIABETES: ICD-10-CM

## 2024-03-15 DIAGNOSIS — Z00.00 MEDICARE ANNUAL WELLNESS VISIT, SUBSEQUENT: Primary | ICD-10-CM

## 2024-03-15 DIAGNOSIS — Z12.11 COLON CANCER SCREENING: ICD-10-CM

## 2024-03-15 DIAGNOSIS — M54.89 OTHER BACK PAIN, UNSPECIFIED CHRONICITY: ICD-10-CM

## 2024-03-15 DIAGNOSIS — Z00.00 HEALTH CARE MAINTENANCE: ICD-10-CM

## 2024-03-15 LAB — HBA1C MFR BLD: 6.2 %

## 2024-03-15 PROCEDURE — 3077F SYST BP >= 140 MM HG: CPT | Performed by: INTERNAL MEDICINE

## 2024-03-15 PROCEDURE — 1123F ACP DISCUSS/DSCN MKR DOCD: CPT | Performed by: INTERNAL MEDICINE

## 2024-03-15 PROCEDURE — 3079F DIAST BP 80-89 MM HG: CPT | Performed by: INTERNAL MEDICINE

## 2024-03-15 PROCEDURE — 83036 HEMOGLOBIN GLYCOSYLATED A1C: CPT | Performed by: INTERNAL MEDICINE

## 2024-03-15 PROCEDURE — 99214 OFFICE O/P EST MOD 30 MIN: CPT | Performed by: INTERNAL MEDICINE

## 2024-03-15 RX ORDER — CARVEDILOL 3.12 MG/1
3.12 TABLET ORAL 2 TIMES DAILY
Qty: 180 TABLET | Refills: 2 | Status: SHIPPED | OUTPATIENT
Start: 2024-03-15

## 2024-03-15 RX ORDER — SEMAGLUTIDE 1.34 MG/ML
0.25 INJECTION, SOLUTION SUBCUTANEOUS WEEKLY
Qty: 1.5 ML | Refills: 0 | Status: SHIPPED | OUTPATIENT
Start: 2024-03-15

## 2024-03-15 RX ORDER — AMLODIPINE BESYLATE 5 MG/1
5 TABLET ORAL DAILY
Qty: 90 TABLET | Refills: 3 | Status: SHIPPED | OUTPATIENT
Start: 2024-03-15

## 2024-03-15 RX ORDER — TRAMADOL HYDROCHLORIDE 50 MG/1
50 TABLET ORAL EVERY 6 HOURS PRN
Qty: 120 TABLET | Refills: 2 | Status: SHIPPED | OUTPATIENT
Start: 2024-03-15 | End: 2024-06-13

## 2024-03-15 SDOH — ECONOMIC STABILITY: INCOME INSECURITY: HOW HARD IS IT FOR YOU TO PAY FOR THE VERY BASICS LIKE FOOD, HOUSING, MEDICAL CARE, AND HEATING?: NOT HARD AT ALL

## 2024-03-15 SDOH — ECONOMIC STABILITY: FOOD INSECURITY: WITHIN THE PAST 12 MONTHS, YOU WORRIED THAT YOUR FOOD WOULD RUN OUT BEFORE YOU GOT MONEY TO BUY MORE.: NEVER TRUE

## 2024-03-15 ASSESSMENT — PATIENT HEALTH QUESTIONNAIRE - PHQ9
SUM OF ALL RESPONSES TO PHQ QUESTIONS 1-9: 0
SUM OF ALL RESPONSES TO PHQ QUESTIONS 1-9: 0
SUM OF ALL RESPONSES TO PHQ9 QUESTIONS 1 & 2: 0
SUM OF ALL RESPONSES TO PHQ QUESTIONS 1-9: 0
2. FEELING DOWN, DEPRESSED OR HOPELESS: NOT AT ALL
SUM OF ALL RESPONSES TO PHQ QUESTIONS 1-9: 0
SUM OF ALL RESPONSES TO PHQ9 QUESTIONS 1 & 2: 0
2. FEELING DOWN, DEPRESSED OR HOPELESS: NOT AT ALL
SUM OF ALL RESPONSES TO PHQ9 QUESTIONS 1 & 2: 0
2. FEELING DOWN, DEPRESSED OR HOPELESS: NOT AT ALL
SUM OF ALL RESPONSES TO PHQ QUESTIONS 1-9: 0
SUM OF ALL RESPONSES TO PHQ QUESTIONS 1-9: 0
1. LITTLE INTEREST OR PLEASURE IN DOING THINGS: NOT AT ALL
SUM OF ALL RESPONSES TO PHQ QUESTIONS 1-9: 0
1. LITTLE INTEREST OR PLEASURE IN DOING THINGS: NOT AT ALL
SUM OF ALL RESPONSES TO PHQ QUESTIONS 1-9: 0
SUM OF ALL RESPONSES TO PHQ QUESTIONS 1-9: 0
1. LITTLE INTEREST OR PLEASURE IN DOING THINGS: NOT AT ALL

## 2024-03-15 ASSESSMENT — LIFESTYLE VARIABLES
HOW OFTEN DO YOU HAVE A DRINK CONTAINING ALCOHOL: NEVER
HOW MANY STANDARD DRINKS CONTAINING ALCOHOL DO YOU HAVE ON A TYPICAL DAY: PATIENT DOES NOT DRINK

## 2024-03-15 NOTE — PROGRESS NOTES
Esther Dodson 74 y.o. male presents today with   Chief Complaint   Patient presents with    6 Month Follow-Up     Pt wants to discuss personal issues    Medication Refill       Hypertension  This is a chronic problem. The current episode started more than 1 year ago. The problem is unchanged. The problem is controlled. Associated symptoms include anxiety. Pertinent negatives include no blurred vision, chest pain, headaches, palpitations or shortness of breath.   Congestive Heart Failure  Presents for follow-up visit. Pertinent negatives include no abdominal pain, chest pain, fatigue, palpitations or shortness of breath. The symptoms have been stable.   Ankle Pain   The incident occurred more than 1 week ago. The incident occurred at home. The pain is present in the left ankle and right ankle. The pain is at a severity of 3/10. The pain is moderate. The pain has been Fluctuating since onset.   Back Pain  This is a chronic problem. The current episode started more than 1 year ago. The problem occurs daily. The problem has been waxing and waning since onset. The pain is present in the lumbar spine. The quality of the pain is described as aching. Pertinent negatives include no abdominal pain, chest pain, fever or headaches.       Past Medical History:   Diagnosis Date    Coronary artery disease involving native coronary artery of native heart without angina pectoris 08/23/2022    Degenerative arthritis of knee, bilateral     GERD (gastroesophageal reflux disease)     Hip arthritis     bilateral    Hyperlipidemia     Hypertension     Morbid obesity ([facility]) 08/23/2022    Osteoarthritis of right thumb     Sleep apnea, obstructive 2022    cpap 9 cm     Patient Active Problem List    Diagnosis Date Noted    Chronic systolic (congestive) heart failure 01/09/2023    DAVID (obstructive sleep apnea) 10/10/2022    Morbid obesity ([facility]) 08/23/2022    Coronary artery disease involving native coronary artery of native heart without

## 2024-03-21 ASSESSMENT — ENCOUNTER SYMPTOMS
VOICE CHANGE: 0
CHOKING: 0
VOMITING: 0
VOICE CHANGE: 0
TROUBLE SWALLOWING: 0
PHOTOPHOBIA: 0
VOMITING: 0
BACK PAIN: 1
BACK PAIN: 1
TROUBLE SWALLOWING: 0
SHORTNESS OF BREATH: 0
SHORTNESS OF BREATH: 0
PHOTOPHOBIA: 0
NAUSEA: 0
CHOKING: 0
BLURRED VISION: 0

## 2024-03-22 NOTE — PROGRESS NOTES
Esther Dodson 74 y.o. male presents today with   Chief Complaint   Patient presents with    Medicare AWV       HPIannual wellness visit    Past Medical History:   Diagnosis Date    Coronary artery disease involving native coronary artery of native heart without angina pectoris 08/23/2022    Degenerative arthritis of knee, bilateral     GERD (gastroesophageal reflux disease)     Hip arthritis     bilateral    Hyperlipidemia     Hypertension     Morbid obesity ([facility]) 08/23/2022    Osteoarthritis of right thumb     Sleep apnea, obstructive 2022    cpap 9 cm     Patient Active Problem List    Diagnosis Date Noted    Chronic systolic (congestive) heart failure 01/09/2023    DAVID (obstructive sleep apnea) 10/10/2022    Morbid obesity ([facility]) 08/23/2022    Coronary artery disease involving native coronary artery of native heart without angina pectoris 08/23/2022    NSVT (nonsustained ventricular tachycardia) ([facility]) 08/16/2022    Ventricular bigeminy 08/08/2022    Chronic renal disease, stage III ([facility]) [994078] 06/27/2022    Pain in left knee 03/11/2019    Unilateral primary osteoarthritis, left knee 03/11/2019    Unilateral primary osteoarthritis, left knee 03/11/2019    Acute bronchitis 01/17/2017     Past Surgical History:   Procedure Laterality Date    CARPAL TUNNEL RELEASE      COLONOSCOPY      KNEE ARTHROPLASTY Left     VASECTOMY       Family History   Problem Relation Age of Onset    Cancer Mother         uterine, breast, skin    Stroke Brother      Social History     Socioeconomic History    Marital status:      Spouse name: None    Number of children: None    Years of education: None    Highest education level: None   Tobacco Use    Smoking status: Never    Smokeless tobacco: Never    Tobacco comments:     cigars, weekly   Vaping Use    Vaping Use: Never used   Substance and Sexual Activity    Alcohol use: Yes     Alcohol/week: 0.0 standard drinks of alcohol     Comment: rare    Drug use: No    Sexual activity:

## 2024-03-22 NOTE — PATIENT INSTRUCTIONS
protects against both UVA and UVB radiation with an SPF of 30 or greater. Reapply every 2 to 3 hours or after sweating, drying off with a towel, or swimming.  Always wear a seat belt when traveling in a car. Always wear a helmet when riding a bicycle or motorcycle.  
No

## 2024-04-29 DIAGNOSIS — R73.03 PREDIABETES: ICD-10-CM

## 2024-04-29 DIAGNOSIS — I10 ESSENTIAL HYPERTENSION: ICD-10-CM

## 2024-04-29 DIAGNOSIS — R73.9 HYPERGLYCEMIA: ICD-10-CM

## 2024-04-29 DIAGNOSIS — I10 ESSENTIAL HYPERTENSION: Primary | ICD-10-CM

## 2024-04-29 DIAGNOSIS — I50.22 CHRONIC SYSTOLIC (CONGESTIVE) HEART FAILURE (HCC): ICD-10-CM

## 2024-04-29 RX ORDER — SEMAGLUTIDE 0.68 MG/ML
0.25 INJECTION, SOLUTION SUBCUTANEOUS WEEKLY
Qty: 3 ML | Refills: 0 | OUTPATIENT
Start: 2024-04-29

## 2024-04-29 RX ORDER — SEMAGLUTIDE 1.34 MG/ML
0.5 INJECTION, SOLUTION SUBCUTANEOUS WEEKLY
Qty: 1.5 ML | Refills: 0 | Status: SHIPPED | OUTPATIENT
Start: 2024-04-29

## 2024-05-22 ENCOUNTER — ENROLLMENT (OUTPATIENT)
Dept: PHARMACY | Facility: CLINIC | Age: 74
End: 2024-05-22

## 2024-05-28 DIAGNOSIS — R73.9 HYPERGLYCEMIA: ICD-10-CM

## 2024-05-28 DIAGNOSIS — I10 ESSENTIAL HYPERTENSION: ICD-10-CM

## 2024-05-28 DIAGNOSIS — R73.03 PREDIABETES: ICD-10-CM

## 2024-05-29 DIAGNOSIS — I25.10 CORONARY ARTERY DISEASE INVOLVING NATIVE CORONARY ARTERY OF NATIVE HEART WITHOUT ANGINA PECTORIS: ICD-10-CM

## 2024-05-29 DIAGNOSIS — G47.33 OBSTRUCTIVE SLEEP APNEA SYNDROME: ICD-10-CM

## 2024-05-29 DIAGNOSIS — R73.03 PREDIABETES: Primary | ICD-10-CM

## 2024-05-29 DIAGNOSIS — I50.22 CHRONIC SYSTOLIC (CONGESTIVE) HEART FAILURE (HCC): ICD-10-CM

## 2024-05-29 RX ORDER — SEMAGLUTIDE 1.34 MG/ML
1 INJECTION, SOLUTION SUBCUTANEOUS
Qty: 3 ML | Refills: 0 | Status: SHIPPED | OUTPATIENT
Start: 2024-05-29

## 2024-05-29 RX ORDER — SEMAGLUTIDE 0.68 MG/ML
INJECTION, SOLUTION SUBCUTANEOUS
Qty: 3 ML | Refills: 0 | OUTPATIENT
Start: 2024-05-29

## 2024-06-26 DIAGNOSIS — I50.22 CHRONIC SYSTOLIC (CONGESTIVE) HEART FAILURE (HCC): ICD-10-CM

## 2024-06-26 DIAGNOSIS — G47.33 OBSTRUCTIVE SLEEP APNEA SYNDROME: ICD-10-CM

## 2024-06-26 DIAGNOSIS — I10 ESSENTIAL HYPERTENSION: ICD-10-CM

## 2024-06-26 DIAGNOSIS — R73.03 PREDIABETES: ICD-10-CM

## 2024-06-26 DIAGNOSIS — I25.10 CORONARY ARTERY DISEASE INVOLVING NATIVE CORONARY ARTERY OF NATIVE HEART WITHOUT ANGINA PECTORIS: ICD-10-CM

## 2024-06-26 RX ORDER — SEMAGLUTIDE 2.68 MG/ML
2 INJECTION, SOLUTION SUBCUTANEOUS
Qty: 3 ML | Refills: 5 | Status: SHIPPED | OUTPATIENT
Start: 2024-06-26

## 2024-06-27 RX ORDER — SEMAGLUTIDE 1.34 MG/ML
INJECTION, SOLUTION SUBCUTANEOUS
Qty: 3 ML | Refills: 0 | OUTPATIENT
Start: 2024-06-27

## 2024-07-05 DIAGNOSIS — K21.9 GASTROESOPHAGEAL REFLUX DISEASE WITHOUT ESOPHAGITIS: ICD-10-CM

## 2024-07-06 RX ORDER — OMEPRAZOLE 20 MG/1
20 CAPSULE, DELAYED RELEASE ORAL DAILY
Qty: 10 CAPSULE | Refills: 0 | Status: SHIPPED | OUTPATIENT
Start: 2024-07-06 | End: 2024-07-16

## 2024-09-03 ENCOUNTER — OFFICE VISIT (OUTPATIENT)
Dept: PRIMARY CARE CLINIC | Age: 74
End: 2024-09-03

## 2024-09-03 VITALS
BODY MASS INDEX: 42.66 KG/M2 | WEIGHT: 315 LBS | TEMPERATURE: 98 F | SYSTOLIC BLOOD PRESSURE: 110 MMHG | OXYGEN SATURATION: 98 % | DIASTOLIC BLOOD PRESSURE: 78 MMHG | HEART RATE: 67 BPM | HEIGHT: 72 IN

## 2024-09-03 DIAGNOSIS — G47.33 OBSTRUCTIVE SLEEP APNEA SYNDROME: ICD-10-CM

## 2024-09-03 DIAGNOSIS — I25.10 CORONARY ARTERY DISEASE INVOLVING NATIVE CORONARY ARTERY OF NATIVE HEART WITHOUT ANGINA PECTORIS: ICD-10-CM

## 2024-09-03 DIAGNOSIS — R73.03 PREDIABETES: ICD-10-CM

## 2024-09-03 DIAGNOSIS — I50.22 CHRONIC SYSTOLIC (CONGESTIVE) HEART FAILURE (HCC): ICD-10-CM

## 2024-09-03 RX ORDER — SEMAGLUTIDE 1.34 MG/ML
1 INJECTION, SOLUTION SUBCUTANEOUS
Qty: 3 ML | Refills: 5 | Status: SHIPPED | OUTPATIENT
Start: 2024-09-03

## 2024-09-03 SDOH — ECONOMIC STABILITY: FOOD INSECURITY: WITHIN THE PAST 12 MONTHS, YOU WORRIED THAT YOUR FOOD WOULD RUN OUT BEFORE YOU GOT MONEY TO BUY MORE.: NEVER TRUE

## 2024-09-03 SDOH — ECONOMIC STABILITY: FOOD INSECURITY: WITHIN THE PAST 12 MONTHS, THE FOOD YOU BOUGHT JUST DIDN'T LAST AND YOU DIDN'T HAVE MONEY TO GET MORE.: NEVER TRUE

## 2024-09-03 SDOH — ECONOMIC STABILITY: INCOME INSECURITY: HOW HARD IS IT FOR YOU TO PAY FOR THE VERY BASICS LIKE FOOD, HOUSING, MEDICAL CARE, AND HEATING?: NOT HARD AT ALL

## 2024-09-03 NOTE — PROGRESS NOTES
Spouse name: None    Number of children: None    Years of education: None    Highest education level: None   Tobacco Use    Smoking status: Never    Smokeless tobacco: Never    Tobacco comments:     cigars, weekly   Vaping Use    Vaping status: Never Used   Substance and Sexual Activity    Alcohol use: Yes     Alcohol/week: 0.0 standard drinks of alcohol     Comment: rare    Drug use: No    Sexual activity: Not Currently     Social Determinants of Health     Financial Resource Strain: Low Risk  (9/3/2024)    Overall Financial Resource Strain (CARDIA)     Difficulty of Paying Living Expenses: Not hard at all   Food Insecurity: No Food Insecurity (9/3/2024)    Hunger Vital Sign     Worried About Running Out of Food in the Last Year: Never true     Ran Out of Food in the Last Year: Never true   Transportation Needs: Unknown (9/3/2024)    PRAPARE - Transportation     Lack of Transportation (Non-Medical): No   Physical Activity: Insufficiently Active (3/15/2024)    Exercise Vital Sign     Days of Exercise per Week: 1 day     Minutes of Exercise per Session: 10 min   Housing Stability: Unknown (9/3/2024)    Housing Stability Vital Sign     Homeless in the Last Year: No     No Known Allergies    Review of Systems   Constitutional:  Negative for fatigue and fever.   HENT:  Negative for congestion, trouble swallowing and voice change.    Eyes:  Negative for photophobia and visual disturbance.   Respiratory:  Negative for cough, choking and shortness of breath.    Cardiovascular:  Negative for chest pain and palpitations.   Gastrointestinal:  Negative for abdominal pain, nausea and vomiting.   Genitourinary:  Negative for decreased urine volume, testicular pain and urgency.   Musculoskeletal:  Negative for arthralgias.   Skin:  Negative for rash.   Neurological:  Negative for tremors and syncope.   Hematological:  Does not bruise/bleed easily.   Psychiatric/Behavioral:  Negative for suicidal ideas.

## 2024-09-07 ASSESSMENT — ENCOUNTER SYMPTOMS
SHORTNESS OF BREATH: 0
TROUBLE SWALLOWING: 0
PHOTOPHOBIA: 0
NAUSEA: 0
VOMITING: 0
COUGH: 0
VOICE CHANGE: 0
ABDOMINAL PAIN: 0
CHOKING: 0

## 2024-09-09 DIAGNOSIS — K21.9 GASTROESOPHAGEAL REFLUX DISEASE WITHOUT ESOPHAGITIS: ICD-10-CM

## 2024-09-12 DIAGNOSIS — K21.9 GASTROESOPHAGEAL REFLUX DISEASE WITHOUT ESOPHAGITIS: ICD-10-CM

## 2024-09-12 RX ORDER — CARVEDILOL 3.12 MG/1
3.12 TABLET ORAL 2 TIMES DAILY
Qty: 180 TABLET | Refills: 2 | Status: SHIPPED | OUTPATIENT
Start: 2024-09-12

## 2024-09-24 DIAGNOSIS — M54.89 OTHER BACK PAIN, UNSPECIFIED CHRONICITY: ICD-10-CM

## 2024-09-24 DIAGNOSIS — M25.572 CHRONIC ANKLE PAIN, BILATERAL: ICD-10-CM

## 2024-09-24 DIAGNOSIS — M25.571 CHRONIC ANKLE PAIN, BILATERAL: ICD-10-CM

## 2024-09-24 DIAGNOSIS — G89.29 CHRONIC ANKLE PAIN, BILATERAL: ICD-10-CM

## 2024-09-24 RX ORDER — TRAMADOL HYDROCHLORIDE 50 MG/1
50 TABLET ORAL EVERY 6 HOURS PRN
Qty: 120 TABLET | Refills: 2 | OUTPATIENT
Start: 2024-09-24

## 2024-11-04 ENCOUNTER — OFFICE VISIT (OUTPATIENT)
Dept: PRIMARY CARE CLINIC | Age: 74
End: 2024-11-04

## 2024-11-04 VITALS
OXYGEN SATURATION: 98 % | SYSTOLIC BLOOD PRESSURE: 172 MMHG | HEART RATE: 78 BPM | HEIGHT: 72 IN | DIASTOLIC BLOOD PRESSURE: 82 MMHG | BODY MASS INDEX: 42.66 KG/M2 | WEIGHT: 315 LBS

## 2024-11-04 DIAGNOSIS — K42.9 UMBILICAL HERNIA WITHOUT OBSTRUCTION AND WITHOUT GANGRENE: Primary | ICD-10-CM

## 2024-11-04 DIAGNOSIS — M54.89 OTHER BACK PAIN, UNSPECIFIED CHRONICITY: ICD-10-CM

## 2024-11-04 RX ORDER — TRAMADOL HYDROCHLORIDE 50 MG/1
50 TABLET ORAL EVERY 6 HOURS PRN
COMMUNITY
Start: 2024-09-04 | End: 2024-11-04 | Stop reason: SDUPTHER

## 2024-11-04 RX ORDER — TRAMADOL HYDROCHLORIDE 50 MG/1
50 TABLET ORAL EVERY 6 HOURS PRN
Qty: 120 TABLET | Refills: 2 | Status: SHIPPED | OUTPATIENT
Start: 2024-11-04 | End: 2025-02-02

## 2024-11-04 ASSESSMENT — ENCOUNTER SYMPTOMS
SHORTNESS OF BREATH: 0
PHOTOPHOBIA: 0
CHOKING: 0
TROUBLE SWALLOWING: 0
VOICE CHANGE: 0

## 2024-11-04 NOTE — PROGRESS NOTES
Esther Dodson 74 y.o. male presents today with   Chief Complaint   Patient presents with    Hernia     Causing pain in abd \"couple weeks\"    Medication Refill       Back Pain  This is a chronic problem. The current episode started more than 1 year ago. The problem occurs daily. The problem has been waxing and waning since onset. The pain is present in the lumbar spine and gluteal. The pain is at a severity of 7/10. The pain is severe. Pertinent negatives include no chest pain or fever.     Umbilical hernia is getting bigger. He prefers sx    Past Medical History:   Diagnosis Date    Coronary artery disease involving native coronary artery of native heart without angina pectoris 08/23/2022    Degenerative arthritis of knee, bilateral     GERD (gastroesophageal reflux disease)     Hip arthritis     bilateral    Hyperlipidemia     Hypertension     Morbid obesity 08/23/2022    Osteoarthritis of right thumb     Sleep apnea, obstructive 2022    cpap 9 cm     Patient Active Problem List    Diagnosis Date Noted    Chronic systolic (congestive) heart failure 01/09/2023    DAVID (obstructive sleep apnea) 10/10/2022    Morbid obesity 08/23/2022    Coronary artery disease involving native coronary artery of native heart without angina pectoris 08/23/2022    NSVT (nonsustained ventricular tachycardia) ([facility]) 08/16/2022    Ventricular bigeminy 08/08/2022    Chronic renal disease, stage III ([facility]) [[id number]] 06/27/2022    Pain in left knee 03/11/2019    Unilateral primary osteoarthritis, left knee 03/11/2019    Unilateral primary osteoarthritis, left knee 03/11/2019    Acute bronchitis 01/17/2017     Past Surgical History:   Procedure Laterality Date    CARPAL TUNNEL RELEASE      COLONOSCOPY      KNEE ARTHROPLASTY Left     VASECTOMY       Family History   Problem Relation Age of Onset    Cancer Mother         uterine, breast, skin    Stroke Brother      Social History     Socioeconomic History    Marital status:      Spouse

## 2024-11-12 ENCOUNTER — HOSPITAL ENCOUNTER (OUTPATIENT)
Dept: CT IMAGING | Age: 74
Discharge: HOME | End: 2024-11-14
Payer: MEDICARE

## 2024-11-12 DIAGNOSIS — K42.9 UMBILICAL HERNIA WITHOUT OBSTRUCTION AND WITHOUT GANGRENE: ICD-10-CM

## 2024-11-12 PROCEDURE — 74176 CT ABD & PELVIS W/O CONTRAST: CPT

## 2024-11-14 ENCOUNTER — OFFICE VISIT (OUTPATIENT)
Dept: SURGERY | Age: 74
End: 2024-11-14
Payer: MEDICARE

## 2024-11-14 VITALS
HEIGHT: 72 IN | DIASTOLIC BLOOD PRESSURE: 104 MMHG | SYSTOLIC BLOOD PRESSURE: 160 MMHG | WEIGHT: 313 LBS | TEMPERATURE: 97.6 F | HEART RATE: 73 BPM | OXYGEN SATURATION: 97 % | BODY MASS INDEX: 42.39 KG/M2

## 2024-11-14 DIAGNOSIS — K43.9 VENTRAL HERNIA WITHOUT OBSTRUCTION OR GANGRENE: Primary | ICD-10-CM

## 2024-11-14 PROCEDURE — 1159F MED LIST DOCD IN RCRD: CPT | Performed by: SURGERY

## 2024-11-14 PROCEDURE — 1123F ACP DISCUSS/DSCN MKR DOCD: CPT | Performed by: SURGERY

## 2024-11-14 PROCEDURE — 99203 OFFICE O/P NEW LOW 30 MIN: CPT | Performed by: SURGERY

## 2024-11-14 NOTE — PROGRESS NOTES
GENERAL SURGERY CLINIC NOTE    Pt Name: Esther Dodson  MRN: [id number]  Date: 11/14/2024        SUBJECTIVE:     History of Chief Complaint:    Esther is a 74 y.o. male who presents with umbilical hernia. He is stating that he noticed this umbilical hernia recently and he is c/o pain because of this hernia.       Past Medical History:   Diagnosis Date    Coronary artery disease involving native coronary artery of native heart without angina pectoris 08/23/2022    Degenerative arthritis of knee, bilateral     GERD (gastroesophageal reflux disease)     Hip arthritis     bilateral    Hyperlipidemia     Hypertension     Morbid obesity 08/23/2022    Osteoarthritis of right thumb     Sleep apnea, obstructive 2022    cpap 9 cm     Past Surgical History:   Procedure Laterality Date    CARPAL TUNNEL RELEASE      COLONOSCOPY      KNEE ARTHROPLASTY Left     VASECTOMY       Prior to Admission medications    Medication Sig Start Date End Date Taking? Authorizing Provider   traMADol (ULTRAM) 50 MG tablet Take 1 tablet by mouth every 6 hours as needed for Pain for up to 90 days. Max Daily Amount: 200 mg 11/4/24 2/2/25 Yes Mati Gonzalez MD   omeprazole (PRILOSEC) 20 MG delayed release capsule TAKE 1 CAPSULE DAILY 9/12/24  Yes Mati Gonzalez MD   carvedilol (COREG) 3.125 MG tablet TAKE 1 TABLET TWICE A DAY 9/12/24  Yes Mati Gonzalez MD   Semaglutide, 1 MG/DOSE, (OZEMPIC, 1 MG/DOSE,) 4 MG/3ML SOPN sc injection Inject 1 mg into the skin every 7 days 9/3/24  Yes Mati Gonzalez MD   semaglutide, 2 MG/DOSE, (OZEMPIC, 2 MG/DOSE,) 8 MG/3ML SOPN sc injection Inject 2 mg into the skin every 7 days 6/26/24  Yes Mati Gonzalez MD   Semaglutide,0.25 or 0.5MG/DOS, (OZEMPIC, 0.25 OR 0.5 MG/DOSE,) 2 MG/1.5ML SOPN Inject 0.5 mg into the skin once a week 4/29/24  Yes Mati Gonzalez MD   Semaglutide,0.25 or 0.5MG/DOS, (OZEMPIC, 0.25 OR 0.5 MG/DOSE,) 2 MG/1.5ML SOPN Inject 0.25 mg into the skin once a week 3/15/24  Yes Mati Gonzalez MD   amLODIPine

## 2024-11-18 ENCOUNTER — TELEPHONE (OUTPATIENT)
Dept: PHARMACY | Facility: CLINIC | Age: 74
End: 2024-11-18

## 2024-11-18 NOTE — TELEPHONE ENCOUNTER
Mati Gonzalez MD, identified with a care gap for ASCVD diagnosis without a statin  - Would patient benefit from updated lipid panel and LFTs, then potential review for statin therapy?  Per patient chart review: CAD dx, last lipid panel 2021; appears did not attend f/u with cardiology this year    Please let me know if I can further assist, or if you would like me to try to reach patient to discuss any further.  Thank you,  Karley Dixon, PharmD, [facility]  Population Health Pharmacist  [facility]  Department, toll free: [phone], option 1    ==============================================================  [facility] CLINICAL PHARMACY: STATIN THERAPY REVIEW  Identified statin use in persons with cardiovascular disease care gap per [employer].     Patient also appears to be prescribed: Diabetes (Prediabetes; 94% PDC YTD)    ASSESSMENT of Statin in Persons with Cardiovascular Disease Care Gap    Esther Dodson  has been identified as having a diagnosis for clinical ASCVD or event (e.g., inpatient hospitalization for MI, CABG, PCI or other revascularization procedures) in the measurement year and not currently filling a moderate or high intensity statin.   Patients included in this care gap are males age 21-75 and females age 40-75.     ASCVD: 9/3/24, PCP OV dx Coronary artery disease involving native coronary artery of native heart without angina pectoris    Currently Prescribed a statin: no  Per chart: atorvastatin 20mg daily removed from medication list in this EMR 6/23/14 as \"error\"    Lab Results   Component Value Date/Time    CHOL 192 06/22/2021 12:58 PM    TRIG 248 06/22/2021 12:58 PM    HDL 35 06/22/2021 12:58 PM     06/22/2021 12:58 PM     ALT   Date Value Ref Range Status   10/11/2022 25 10 - 52 U/L Final     AST   Date Value Ref Range Status   10/11/2022 21 9 - 39 U/L Final     The ASCVD Risk score (Latoya DAVIS, et al., 2019) failed to calculate for the following reasons:    Cannot

## 2024-11-21 NOTE — TELEPHONE ENCOUNTER
Noted provider reviewed/doned encounter. No noted labs/change at this time.    =======================================================   For Pharmacy Admin Tracking Only    Program: [facility]  CPA in place:  No  Recommendation Provided To: Provider: 1 via Note to Provider  Intervention Accepted By: Provider: 0  Gap Closed?: No   Time Spent (min): 15

## 2025-01-27 ENCOUNTER — TELEPHONE (OUTPATIENT)
Dept: PRIMARY CARE CLINIC | Age: 75
End: 2025-01-27

## 2025-01-27 NOTE — TELEPHONE ENCOUNTER
Ran the prior auth on patient's medication for Ozempic #[id number] and patient was made aware via [employer] to inform him. It came back stating it has been approved, will scan approval into the system

## 2025-03-25 ENCOUNTER — TELEPHONE (OUTPATIENT)
Dept: PHARMACY | Facility: CLINIC | Age: 75
End: 2025-03-25

## 2025-03-25 NOTE — TELEPHONE ENCOUNTER
[facility] CLINICAL PHARMACY: ADHERENCE REVIEW  Identified care gap per [employer]: fills at [facility] [place]: Diabetes adherence    ASSESSMENT  DIABETES ADHERENCE    Insurance Records claims through 3/7/25 (Prior Year PDC = not reported; YTD PDC = 72%; Potential Fail Date: 05.03.25):   OZEMPIC INJ 4MG/3ML last filled on 01.28.25 for 28 day supply. Next refill due: 02.025.25    Prescribed sig:  inject once weekly    Per Insurer Portal: last filled on same    Per [facility]: last picked up on 3.16.25 for 28 day supply.    Lab Results   Component Value Date    LABA1C 6.2 03/15/2024    LABA1C 6.1 (H) 08/14/2023    LABA1C 5.7 04/04/2022     NOTE: A1c not yet completed this calendar year    The following are interventions that have been identified:   Patient OVERDUE refilling OZEMPIC INJ 4MG/3ML and active on home medication list.     No patient outreach planned at this time.     Per pharmacy patient did  03.16.25    Last Visit: 11.04.24  Next Visit: none    Jill Pizarro CPhT  [facility] Clinical   [facility]  Toll Free: [phone] Option 1    For Pharmacy Admin Tracking Only    Program: [facility] [employer]  CPA in place:  No  Gap Closed?: Yes   Time Spent (min): 10

## 2025-03-31 DIAGNOSIS — K21.9 GASTROESOPHAGEAL REFLUX DISEASE WITHOUT ESOPHAGITIS: ICD-10-CM

## 2025-03-31 RX ORDER — CARVEDILOL 3.12 MG/1
3.12 TABLET ORAL 2 TIMES DAILY
Qty: 180 TABLET | Refills: 2 | Status: SHIPPED | OUTPATIENT
Start: 2025-03-31

## 2025-03-31 RX ORDER — OMEPRAZOLE 20 MG/1
20 CAPSULE, DELAYED RELEASE ORAL DAILY
Qty: 90 CAPSULE | Refills: 2 | Status: SHIPPED | OUTPATIENT
Start: 2025-03-31

## 2025-04-01 ENCOUNTER — OFFICE VISIT (OUTPATIENT)
Dept: PRIMARY CARE CLINIC | Age: 75
End: 2025-04-01
Payer: MEDICARE

## 2025-04-01 VITALS
WEIGHT: 315 LBS | SYSTOLIC BLOOD PRESSURE: 138 MMHG | TEMPERATURE: 98.1 F | HEART RATE: 65 BPM | OXYGEN SATURATION: 96 % | BODY MASS INDEX: 43.4 KG/M2 | DIASTOLIC BLOOD PRESSURE: 76 MMHG

## 2025-04-01 DIAGNOSIS — M25.572 CHRONIC ANKLE PAIN, BILATERAL: ICD-10-CM

## 2025-04-01 DIAGNOSIS — K42.9 UMBILICAL HERNIA WITHOUT OBSTRUCTION AND WITHOUT GANGRENE: ICD-10-CM

## 2025-04-01 DIAGNOSIS — Z00.00 MEDICARE ANNUAL WELLNESS VISIT, SUBSEQUENT: Primary | ICD-10-CM

## 2025-04-01 DIAGNOSIS — M54.89 OTHER BACK PAIN, UNSPECIFIED CHRONICITY: ICD-10-CM

## 2025-04-01 DIAGNOSIS — I47.29 NSVT (NONSUSTAINED VENTRICULAR TACHYCARDIA) (HCC): ICD-10-CM

## 2025-04-01 DIAGNOSIS — M25.571 CHRONIC ANKLE PAIN, BILATERAL: ICD-10-CM

## 2025-04-01 DIAGNOSIS — N18.31 STAGE 3A CHRONIC KIDNEY DISEASE (HCC): ICD-10-CM

## 2025-04-01 DIAGNOSIS — E66.01 OBESITIES, MORBID: ICD-10-CM

## 2025-04-01 DIAGNOSIS — G89.29 CHRONIC ANKLE PAIN, BILATERAL: ICD-10-CM

## 2025-04-01 DIAGNOSIS — R73.03 PREDIABETES: ICD-10-CM

## 2025-04-01 DIAGNOSIS — I50.22 CHRONIC SYSTOLIC (CONGESTIVE) HEART FAILURE: ICD-10-CM

## 2025-04-01 LAB — HBA1C MFR BLD: 5.2 %

## 2025-04-01 PROCEDURE — G0439 PPPS, SUBSEQ VISIT: HCPCS | Performed by: INTERNAL MEDICINE

## 2025-04-01 PROCEDURE — 99213 OFFICE O/P EST LOW 20 MIN: CPT | Performed by: INTERNAL MEDICINE

## 2025-04-01 PROCEDURE — 83036 HEMOGLOBIN GLYCOSYLATED A1C: CPT | Performed by: INTERNAL MEDICINE

## 2025-04-01 PROCEDURE — 1159F MED LIST DOCD IN RCRD: CPT | Performed by: INTERNAL MEDICINE

## 2025-04-01 PROCEDURE — 1123F ACP DISCUSS/DSCN MKR DOCD: CPT | Performed by: INTERNAL MEDICINE

## 2025-04-01 RX ORDER — TRAMADOL HYDROCHLORIDE 50 MG/1
50 TABLET ORAL EVERY 6 HOURS PRN
COMMUNITY
Start: 2025-02-21

## 2025-04-01 SDOH — ECONOMIC STABILITY: FOOD INSECURITY: WITHIN THE PAST 12 MONTHS, THE FOOD YOU BOUGHT JUST DIDN'T LAST AND YOU DIDN'T HAVE MONEY TO GET MORE.: NEVER TRUE

## 2025-04-01 SDOH — ECONOMIC STABILITY: FOOD INSECURITY: WITHIN THE PAST 12 MONTHS, YOU WORRIED THAT YOUR FOOD WOULD RUN OUT BEFORE YOU GOT MONEY TO BUY MORE.: NEVER TRUE

## 2025-04-01 ASSESSMENT — PATIENT HEALTH QUESTIONNAIRE - PHQ9
SUM OF ALL RESPONSES TO PHQ QUESTIONS 1-9: 0
SUM OF ALL RESPONSES TO PHQ QUESTIONS 1-9: 0
1. LITTLE INTEREST OR PLEASURE IN DOING THINGS: NOT AT ALL
2. FEELING DOWN, DEPRESSED OR HOPELESS: NOT AT ALL
SUM OF ALL RESPONSES TO PHQ QUESTIONS 1-9: 0
SUM OF ALL RESPONSES TO PHQ QUESTIONS 1-9: 0

## 2025-04-01 ASSESSMENT — LIFESTYLE VARIABLES
HOW MANY STANDARD DRINKS CONTAINING ALCOHOL DO YOU HAVE ON A TYPICAL DAY: PATIENT DOES NOT DRINK
HOW OFTEN DO YOU HAVE A DRINK CONTAINING ALCOHOL: NEVER

## 2025-04-01 NOTE — PROGRESS NOTES
Esther Dodson 75 y.o. male presents today with   Chief Complaint   Patient presents with    Medicare AW    Abdominal Pain     Nauseated, dry heaving , dry mouth    Hernia     Annual wellness visit  Abdominal Pain  This is a recurrent problem. The current episode started more than 1 year ago. The onset quality is sudden. The problem occurs intermittently. The problem has been waxing and waning. The pain is located in the periumbilical region (hx of umbilical hernia). Associated symptoms include arthralgias and myalgias. Pertinent negatives include no fever, nausea or vomiting.   Congestive Heart Failure  Presents for follow-up visit. Associated symptoms include abdominal pain, edema and palpitations (stable). Pertinent negatives include no chest pain, fatigue or shortness of breath. The symptoms have been stable.    glucose check, she been prediabetic.      Past Medical History:   Diagnosis Date    Coronary artery disease involving native coronary artery of native heart without angina pectoris 08/23/2022    Degenerative arthritis of knee, bilateral     GERD (gastroesophageal reflux disease)     Hip arthritis     bilateral    Hyperlipidemia     Hypertension     Morbid obesity 08/23/2022    Osteoarthritis of right thumb     Sleep apnea, obstructive 2022    cpap 9 cm     Patient Active Problem List    Diagnosis Date Noted    Chronic systolic (congestive) heart failure 01/09/2023    DAVID (obstructive sleep apnea) 10/10/2022    Morbid obesity 08/23/2022    Coronary artery disease involving native coronary artery of native heart without angina pectoris 08/23/2022    NSVT (nonsustained ventricular tachycardia) ([facility]) 08/16/2022    Ventricular bigeminy 08/08/2022    Chronic renal disease, stage III ([facility]) [[id number]] 06/27/2022    Pain in left knee 03/11/2019    Unilateral primary osteoarthritis, left knee 03/11/2019    Unilateral primary osteoarthritis, left knee 03/11/2019    Acute bronchitis 01/17/2017     Past Surgical

## 2025-04-02 ENCOUNTER — TELEPHONE (OUTPATIENT)
Dept: PHARMACY | Facility: CLINIC | Age: 75
End: 2025-04-02

## 2025-04-02 ASSESSMENT — ENCOUNTER SYMPTOMS
CHOKING: 0
VOMITING: 0
PHOTOPHOBIA: 0
ABDOMINAL PAIN: 1
BACK PAIN: 1
NAUSEA: 0
TROUBLE SWALLOWING: 0
VOICE CHANGE: 0
SHORTNESS OF BREATH: 0

## 2025-04-02 NOTE — TELEPHONE ENCOUNTER
Mati Gonzalez MD, from below, had appointment with you 4/1/25 - identified with a care gap for diabetes without a statin  - Would patient benefit from updated lipid panel and LFTs?  Also, per patient chart review: Was previously excluded with a dx code of Prediabetes (R73.03) at 3/15/24 OV. This dx code must be entered once each calendar year for the patient to be excluded from taking a statin.   NOTE: Hyperglycemia (R73.9) dx used with yesterday's visit is not CMS-eligible exclusion diagnosis    If appropriate, please consider using the following CMS eligible diagnosis within your visit encounter:  Prediabetes (R73.03)   Other abnormal blood glucose (R73.09)  - This will complete/close this insurer-identified gap for this calendar year.    Please let me know if I can further assist.  Thank you,  Karley Dixon, PharmD, [facility]  Population Health Pharmacist  [facility] Clinical [facility]  Department, toll free: [phone], option 1    ==============================================================    POPULATION [facility] CLINICAL PHARMACY: STATIN THERAPY REVIEW  Identified statin use in persons with diabetes care gap per [employer]. Records dated: 3/27/25.    Patient also appears to be prescribed: Diabetes (2024 PDC 87.5%; YTD PDC 70%, 28ds last filled 3/13/25 with 1 refill remaining)    ASSESSMENT  STATIN GAP IDENTIFIED    Per chart review: patient may be excluded with appropriate CMS eligible dx code for SUPD:  Prediabetes (R73.03) or Other abnormal blood glucose (R73.09)  3/15/24 PCP OV: Prediabetes (R73.03) dx  Hyperglycemia (R73.9) dx with yesterday's AWV  Note also, CAD dx - as below, last lipid panel 2021    Lab Results   Component Value Date    CHOL 192 06/22/2021    TRIG 248 (H) 06/22/2021    HDL 35 (L) 06/22/2021     Lab Results   Component Value Date     06/22/2021      ALT   Date Value Ref Range Status   10/11/2022 25 10 - 52 U/L Final     AST   Date Value Ref Range Status   10/11/2022 21 9 - 39 U/L Final

## 2025-04-02 NOTE — PATIENT INSTRUCTIONS
energy,  Keep your mind sharp.  Improve balance to reduce your risk of falls.  Help you manage chronic illness with fewer medicines.  No matter how old you are, how fit you are, or what health problems you have, there is a form of activity that will work for you. And the more physical activity you can do, the better your overall health will be.  What kinds of activity can help you stay healthy?  Being more active will make your daily activities easier. Physical activity includes planned exercise and things you do in daily life. There are four types of activity:  Aerobic.  Doing aerobic activity makes your heart and lungs strong.  Includes walking, dancing, and gardening.  Aim for at least 2½ hours spread throughout the week.  It improves your energy and can help you sleep better.  Muscle-strengthening.  This type of activity can help maintain muscle and strengthen bones.  Includes climbing stairs, using resistance bands, and lifting or carrying heavy loads.  Aim for at least twice a week.  It can help protect the knees and other joints.  Stretching.  Stretching gives you better range of motion in joints and muscles.  Includes upper arm stretches, calf stretches, and gentle yoga.  Aim for at least twice a week, preferably after your muscles are warmed up from other activities.  It can help you function better in daily life.  Balancing.  This helps you stay coordinated and have good posture.  Includes heel-to-toe walking, jose miguel chi, and certain types of yoga.  Aim for at least 3 days a week.  It can reduce your risk of falling.  Even if you have a hard time meeting the recommendations, it's better to be more active than less active. All activity done in each category counts toward your weekly total. You'd be surprised how daily things like carrying groceries, keeping up with grandchildren, and taking the stairs can add up.  What keeps you from being active?  If you've had a hard time being more active, you're not alone.

## 2025-04-03 NOTE — TELEPHONE ENCOUNTER
Noted prediabetes dx now with 4/1 visit, thank you for reviewing    =======================================================   For Pharmacy Admin Tracking Only    Program: Pop Health  CPA in place:  No  Recommendation Provided To: Provider: 1 via Note to Provider  Intervention Accepted By: Provider: 1  Gap Closed?: Yes   Time Spent (min): 15

## 2025-04-17 ENCOUNTER — TELEPHONE (OUTPATIENT)
Dept: GASTROENTEROLOGY | Age: 75
End: 2025-04-17

## 2025-04-17 NOTE — TELEPHONE ENCOUNTER
Called X1 Colonoscopy scheduling LMOM.Please call [phone] ext [phone] schedule colon screening .

## 2025-05-01 ENCOUNTER — TELEPHONE (OUTPATIENT)
Dept: PHARMACY | Facility: CLINIC | Age: 75
End: 2025-05-01

## 2025-05-01 NOTE — TELEPHONE ENCOUNTER
[facility] [place] 2[place] CLINICAL PHARMACY: ADHERENCE REVIEW  Identified care gap per [employer]: fills at [facility]: Diabetes adherence      ASSESSMENT  DIABETES ADHERENCE    Insurance Records claims through 25 (Prior Year PDC = 87% - PASSED ; YTD PDC = 66%; Potential Fail Date: 25):   OZEMPIC      INJ 4MG/3ML last filled on 25 for 28 day supply. Next refill due: 04.10.25    Prescribed sig:  inject once weekly    Per Insurer Portal: last filled on same    [facility]: will get  30 day supply ready to  since past due.    Lab Results   Component Value Date    LABA1C 5.2 2025    LABA1C 6.2 03/15/2024    LABA1C 6.1 (H) 2023     NOTE: A1c <9%  The following are interventions that have been identified:   Patient OVERDUE refilling OZEMPIC      INJ 4MG/3ML and active on home medication list.     Attempting to reach patient to review.  Left message asking for return call. Ringerscommunicationshart message sent to patient. Letter sent to patient.  Per chart To cut back on ozempic 25 .      Last Visit: 25  Next Visit: none    Jill Pizarro CPhT  [facility] Clinical   [facility] Clinical Pharmacy  Toll Free: [phone] Option 1    For Pharmacy Admin Tracking Only    Program: [facility] [employer]  CPA in place:  No  Recommendation Provided To: Pharmacy: 1  Intervention Detail: Adherence Monitorin  Intervention Accepted By: Pharmacy: 1  Gap Closed?: No   Time Spent (min): 15

## 2025-05-02 NOTE — TELEPHONE ENCOUNTER
Follow up call to see if patient is still taking Ozempic or if weaning off of medication.    Jill Pizarro CPhT.  Population Health Clinical   [facility] Clinical Pharmacy  Toll free: [phone] Option 1;

## 2025-05-12 DIAGNOSIS — I10 ESSENTIAL HYPERTENSION: ICD-10-CM

## 2025-05-12 RX ORDER — AMLODIPINE BESYLATE 5 MG/1
5 TABLET ORAL DAILY
Qty: 90 TABLET | Refills: 3 | Status: SHIPPED | OUTPATIENT
Start: 2025-05-12

## 2025-07-02 ENCOUNTER — COMMUNITY OUTREACH (OUTPATIENT)
Dept: PRIMARY CARE CLINIC | Age: 75
End: 2025-07-02

## 2025-07-11 ENCOUNTER — APPOINTMENT (OUTPATIENT)
Dept: CT IMAGING | Age: 75
DRG: 322 | End: 2025-07-11
Payer: MEDICARE

## 2025-07-11 ENCOUNTER — OFFICE VISIT (OUTPATIENT)
Dept: PRIMARY CARE CLINIC | Age: 75
End: 2025-07-11

## 2025-07-11 ENCOUNTER — HOSPITAL ENCOUNTER (INPATIENT)
Age: 75
LOS: 3 days | Discharge: HOME | DRG: 322 | End: 2025-07-15
Admitting: STUDENT IN AN ORGANIZED HEALTH CARE EDUCATION/TRAINING PROGRAM
Payer: MEDICARE

## 2025-07-11 VITALS — HEART RATE: 70 BPM | BODY MASS INDEX: 42.66 KG/M2 | WEIGHT: 315 LBS | HEIGHT: 72 IN | OXYGEN SATURATION: 91 %

## 2025-07-11 DIAGNOSIS — I48.91 NEW ONSET A-FIB (HCC): Primary | ICD-10-CM

## 2025-07-11 DIAGNOSIS — I48.91 ATRIAL FIBRILLATION WITH RVR (HCC): ICD-10-CM

## 2025-07-11 DIAGNOSIS — I48.91 ATRIAL FIBRILLATION, NEW ONSET (HCC): Primary | ICD-10-CM

## 2025-07-11 DIAGNOSIS — I48.91 ATRIAL FIBRILLATION, UNSPECIFIED TYPE (HCC): ICD-10-CM

## 2025-07-11 DIAGNOSIS — R06.02 SOB (SHORTNESS OF BREATH): ICD-10-CM

## 2025-07-11 DIAGNOSIS — J90 PLEURAL EFFUSION, RIGHT: ICD-10-CM

## 2025-07-11 DIAGNOSIS — I42.9 CARDIOMYOPATHY (HCC): ICD-10-CM

## 2025-07-11 DIAGNOSIS — N18.9 CHRONIC KIDNEY DISEASE, UNSPECIFIED CKD STAGE: ICD-10-CM

## 2025-07-11 DIAGNOSIS — N28.1 RENAL CYST: ICD-10-CM

## 2025-07-11 LAB
ALBUMIN SERPL-MCNC: 3.5 G/DL (ref 3.5–4.6)
ALP SERPL-CCNC: 40 U/L (ref 35–104)
ALT SERPL-CCNC: 24 U/L (ref 0–41)
ANION GAP SERPL CALCULATED.3IONS-SCNC: 13 MEQ/L (ref 9–15)
APTT PPP: 29.7 SEC (ref 24.4–36.8)
AST SERPL-CCNC: 31 U/L (ref 0–40)
B PARAP IS1001 DNA NPH QL NAA+NON-PROBE: NOT DETECTED
B PERT.PT PRMT NPH QL NAA+NON-PROBE: NOT DETECTED
BASOPHILS # BLD: 0.1 K/UL (ref 0–0.2)
BASOPHILS NFR BLD: 0.6 %
BILIRUB SERPL-MCNC: 0.5 MG/DL (ref 0.2–0.7)
BNP BLD-MCNC: 5105 PG/ML
BUN SERPL-MCNC: 25 MG/DL (ref 8–23)
C PNEUM DNA NPH QL NAA+NON-PROBE: NOT DETECTED
CALCIUM SERPL-MCNC: 8.2 MG/DL (ref 8.5–9.9)
CHLORIDE SERPL-SCNC: 112 MEQ/L (ref 95–107)
CO2 SERPL-SCNC: 15 MEQ/L (ref 20–31)
CREAT SERPL-MCNC: 1.37 MG/DL (ref 0.7–1.2)
EOSINOPHIL # BLD: 0.3 K/UL (ref 0–0.7)
EOSINOPHIL NFR BLD: 3.2 %
ERYTHROCYTE [DISTWIDTH] IN BLOOD BY AUTOMATED COUNT: 13.6 % (ref 11.5–14.5)
FLUAV RNA NPH QL NAA+NON-PROBE: NOT DETECTED
FLUBV RNA NPH QL NAA+NON-PROBE: NOT DETECTED
GLOBULIN SER CALC-MCNC: 3 G/DL (ref 2.3–3.5)
GLUCOSE SERPL-MCNC: 115 MG/DL (ref 70–99)
HADV DNA NPH QL NAA+NON-PROBE: NOT DETECTED
HCOV 229E RNA NPH QL NAA+NON-PROBE: NOT DETECTED
HCOV HKU1 RNA NPH QL NAA+NON-PROBE: NOT DETECTED
HCOV NL63 RNA NPH QL NAA+NON-PROBE: NOT DETECTED
HCOV OC43 RNA NPH QL NAA+NON-PROBE: NOT DETECTED
HCT VFR BLD AUTO: 42.9 % (ref 42–52)
HGB BLD-MCNC: 13.6 G/DL (ref 14–18)
HMPV RNA NPH QL NAA+NON-PROBE: NOT DETECTED
HPIV1 RNA NPH QL NAA+NON-PROBE: NOT DETECTED
HPIV2 RNA NPH QL NAA+NON-PROBE: NOT DETECTED
HPIV3 RNA NPH QL NAA+NON-PROBE: NOT DETECTED
HPIV4 RNA NPH QL NAA+NON-PROBE: NOT DETECTED
INR PPP: 1.1
LACTATE BLDV-SCNC: 1.3 MMOL/L (ref 0.5–2.2)
LYMPHOCYTES # BLD: 1.1 K/UL (ref 1–4.8)
LYMPHOCYTES NFR BLD: 12.5 %
M PNEUMO DNA NPH QL NAA+NON-PROBE: NOT DETECTED
MAGNESIUM SERPL-MCNC: 2.1 MG/DL (ref 1.7–2.4)
MCH RBC QN AUTO: 29.1 PG (ref 27–31.3)
MCHC RBC AUTO-ENTMCNC: 31.7 % (ref 33–37)
MCV RBC AUTO: 91.9 FL (ref 79–92.2)
MONOCYTES # BLD: 0.7 K/UL (ref 0.2–0.8)
MONOCYTES NFR BLD: 8.2 %
NEUTROPHILS # BLD: 6.6 K/UL (ref 1.4–6.5)
NEUTS SEG NFR BLD: 75.2 %
PLATELET # BLD AUTO: 174 K/UL (ref 130–400)
POTASSIUM SERPL-SCNC: 5.2 MEQ/L (ref 3.4–4.9)
PROCALCITONIN SERPL IA-MCNC: 0.04 NG/ML (ref 0–0.15)
PROT SERPL-MCNC: 6.5 G/DL (ref 6.3–8)
PROTHROMBIN TIME: 14.5 SEC (ref 12.3–14.9)
RBC # BLD AUTO: 4.67 M/UL (ref 4.7–6.1)
RSV RNA NPH QL NAA+NON-PROBE: NOT DETECTED
RV+EV RNA NPH QL NAA+NON-PROBE: NOT DETECTED
SARS-COV-2 RNA NPH QL NAA+NON-PROBE: NOT DETECTED
SODIUM SERPL-SCNC: 140 MEQ/L (ref 135–144)
TROPONIN, HIGH SENSITIVITY: 20 NG/L (ref 0–19)
TROPONIN, HIGH SENSITIVITY: 21 NG/L (ref 0–19)
WBC # BLD AUTO: 8.8 K/UL (ref 4.8–10.8)

## 2025-07-11 PROCEDURE — 96372 THER/PROPH/DIAG INJ SC/IM: CPT

## 2025-07-11 PROCEDURE — 84145 PROCALCITONIN (PCT): CPT

## 2025-07-11 PROCEDURE — 85025 COMPLETE CBC W/AUTO DIFF WBC: CPT

## 2025-07-11 PROCEDURE — 71275 CT ANGIOGRAPHY CHEST: CPT

## 2025-07-11 PROCEDURE — 85610 PROTHROMBIN TIME: CPT

## 2025-07-11 PROCEDURE — 83605 ASSAY OF LACTIC ACID: CPT

## 2025-07-11 PROCEDURE — 96365 THER/PROPH/DIAG IV INF INIT: CPT

## 2025-07-11 PROCEDURE — 6360000004 HC RX CONTRAST MEDICATION: Performed by: STUDENT IN AN ORGANIZED HEALTH CARE EDUCATION/TRAINING PROGRAM

## 2025-07-11 PROCEDURE — 96375 TX/PRO/DX INJ NEW DRUG ADDON: CPT

## 2025-07-11 PROCEDURE — 83880 ASSAY OF NATRIURETIC PEPTIDE: CPT

## 2025-07-11 PROCEDURE — 83735 ASSAY OF MAGNESIUM: CPT

## 2025-07-11 PROCEDURE — 2580000003 HC RX 258: Performed by: STUDENT IN AN ORGANIZED HEALTH CARE EDUCATION/TRAINING PROGRAM

## 2025-07-11 PROCEDURE — 6360000002 HC RX W HCPCS: Performed by: STUDENT IN AN ORGANIZED HEALTH CARE EDUCATION/TRAINING PROGRAM

## 2025-07-11 PROCEDURE — 0202U NFCT DS 22 TRGT SARS-COV-2: CPT

## 2025-07-11 PROCEDURE — G0378 HOSPITAL OBSERVATION PER HR: HCPCS

## 2025-07-11 PROCEDURE — 96376 TX/PRO/DX INJ SAME DRUG ADON: CPT

## 2025-07-11 PROCEDURE — 80053 COMPREHEN METABOLIC PANEL: CPT

## 2025-07-11 PROCEDURE — 84484 ASSAY OF TROPONIN QUANT: CPT

## 2025-07-11 PROCEDURE — 99285 EMERGENCY DEPT VISIT HI MDM: CPT

## 2025-07-11 PROCEDURE — 85730 THROMBOPLASTIN TIME PARTIAL: CPT

## 2025-07-11 PROCEDURE — 36415 COLL VENOUS BLD VENIPUNCTURE: CPT

## 2025-07-11 PROCEDURE — 93005 ELECTROCARDIOGRAM TRACING: CPT

## 2025-07-11 PROCEDURE — 96366 THER/PROPH/DIAG IV INF ADDON: CPT

## 2025-07-11 PROCEDURE — 96374 THER/PROPH/DIAG INJ IV PUSH: CPT

## 2025-07-11 RX ORDER — ENOXAPARIN SODIUM 150 MG/ML
1 INJECTION SUBCUTANEOUS 2 TIMES DAILY
Status: DISCONTINUED | OUTPATIENT
Start: 2025-07-11 | End: 2025-07-11 | Stop reason: ALTCHOICE

## 2025-07-11 RX ORDER — ACETAMINOPHEN 650 MG/1
650 SUPPOSITORY RECTAL EVERY 6 HOURS PRN
Status: DISCONTINUED | OUTPATIENT
Start: 2025-07-11 | End: 2025-07-15 | Stop reason: HOSPADM

## 2025-07-11 RX ORDER — ONDANSETRON 2 MG/ML
4 INJECTION INTRAMUSCULAR; INTRAVENOUS EVERY 6 HOURS PRN
Status: DISCONTINUED | OUTPATIENT
Start: 2025-07-11 | End: 2025-07-15 | Stop reason: HOSPADM

## 2025-07-11 RX ORDER — ACETAMINOPHEN 325 MG/1
650 TABLET ORAL EVERY 6 HOURS PRN
Status: DISCONTINUED | OUTPATIENT
Start: 2025-07-11 | End: 2025-07-15 | Stop reason: HOSPADM

## 2025-07-11 RX ORDER — SODIUM CHLORIDE 0.9 % (FLUSH) 0.9 %
5-40 SYRINGE (ML) INJECTION EVERY 12 HOURS SCHEDULED
Status: DISCONTINUED | OUTPATIENT
Start: 2025-07-11 | End: 2025-07-15 | Stop reason: HOSPADM

## 2025-07-11 RX ORDER — MAGNESIUM SULFATE IN WATER 40 MG/ML
2000 INJECTION, SOLUTION INTRAVENOUS PRN
Status: DISCONTINUED | OUTPATIENT
Start: 2025-07-11 | End: 2025-07-15 | Stop reason: HOSPADM

## 2025-07-11 RX ORDER — IOPAMIDOL 755 MG/ML
75 INJECTION, SOLUTION INTRAVASCULAR
Status: COMPLETED | OUTPATIENT
Start: 2025-07-11 | End: 2025-07-11

## 2025-07-11 RX ORDER — ONDANSETRON 4 MG/1
4 TABLET, ORALLY DISINTEGRATING ORAL EVERY 8 HOURS PRN
Status: DISCONTINUED | OUTPATIENT
Start: 2025-07-11 | End: 2025-07-15 | Stop reason: HOSPADM

## 2025-07-11 RX ORDER — FUROSEMIDE 10 MG/ML
40 INJECTION INTRAMUSCULAR; INTRAVENOUS ONCE
Status: COMPLETED | OUTPATIENT
Start: 2025-07-11 | End: 2025-07-11

## 2025-07-11 RX ORDER — POTASSIUM CHLORIDE 1500 MG/1
40 TABLET, EXTENDED RELEASE ORAL PRN
Status: DISCONTINUED | OUTPATIENT
Start: 2025-07-11 | End: 2025-07-15 | Stop reason: HOSPADM

## 2025-07-11 RX ORDER — DILTIAZEM HYDROCHLORIDE 5 MG/ML
20 INJECTION INTRAVENOUS ONCE
Status: COMPLETED | OUTPATIENT
Start: 2025-07-11 | End: 2025-07-11

## 2025-07-11 RX ORDER — ENOXAPARIN SODIUM 150 MG/ML
1 INJECTION SUBCUTANEOUS 2 TIMES DAILY
Status: DISCONTINUED | OUTPATIENT
Start: 2025-07-11 | End: 2025-07-14

## 2025-07-11 RX ORDER — SODIUM CHLORIDE 9 MG/ML
INJECTION, SOLUTION INTRAVENOUS PRN
Status: DISCONTINUED | OUTPATIENT
Start: 2025-07-11 | End: 2025-07-15 | Stop reason: HOSPADM

## 2025-07-11 RX ORDER — SODIUM CHLORIDE 0.9 % (FLUSH) 0.9 %
5-40 SYRINGE (ML) INJECTION PRN
Status: DISCONTINUED | OUTPATIENT
Start: 2025-07-11 | End: 2025-07-15 | Stop reason: HOSPADM

## 2025-07-11 RX ORDER — POLYETHYLENE GLYCOL 3350 17 G/17G
17 POWDER, FOR SOLUTION ORAL DAILY PRN
Status: DISCONTINUED | OUTPATIENT
Start: 2025-07-11 | End: 2025-07-15 | Stop reason: HOSPADM

## 2025-07-11 RX ORDER — POTASSIUM CHLORIDE 7.45 MG/ML
10 INJECTION INTRAVENOUS PRN
Status: DISCONTINUED | OUTPATIENT
Start: 2025-07-11 | End: 2025-07-15 | Stop reason: HOSPADM

## 2025-07-11 RX ORDER — PREDNISOLONE ACETATE 10 MG/ML
SUSPENSION/ DROPS OPHTHALMIC
Status: ON HOLD | COMMUNITY
Start: 2025-05-27

## 2025-07-11 RX ORDER — MOXIFLOXACIN 5 MG/ML
SOLUTION/ DROPS OPHTHALMIC
Status: ON HOLD | COMMUNITY
Start: 2025-05-27

## 2025-07-11 RX ADMIN — ONDANSETRON 4 MG: 2 INJECTION, SOLUTION INTRAMUSCULAR; INTRAVENOUS at 21:52

## 2025-07-11 RX ADMIN — FUROSEMIDE 40 MG: 10 INJECTION, SOLUTION INTRAMUSCULAR; INTRAVENOUS at 16:05

## 2025-07-11 RX ADMIN — ENOXAPARIN SODIUM 150 MG: 150 INJECTION SUBCUTANEOUS at 17:04

## 2025-07-11 RX ADMIN — DILTIAZEM HYDROCHLORIDE 20 MG: 5 INJECTION, SOLUTION INTRAVENOUS at 12:58

## 2025-07-11 RX ADMIN — DILTIAZEM HYDROCHLORIDE 5 MG/HR: 5 INJECTION, SOLUTION INTRAVENOUS at 16:09

## 2025-07-11 RX ADMIN — IOPAMIDOL 75 ML: 755 INJECTION, SOLUTION INTRAVENOUS at 13:13

## 2025-07-11 ASSESSMENT — ENCOUNTER SYMPTOMS
SHORTNESS OF BREATH: 1
TROUBLE SWALLOWING: 0
NAUSEA: 0
BACK PAIN: 1
VOMITING: 0
CHOKING: 0
RHINORRHEA: 0
VOICE CHANGE: 0
PHOTOPHOBIA: 0

## 2025-07-11 ASSESSMENT — PAIN DESCRIPTION - ORIENTATION: ORIENTATION: MID

## 2025-07-11 ASSESSMENT — PAIN DESCRIPTION - LOCATION
LOCATION: ABDOMEN
LOCATION: CHEST

## 2025-07-11 ASSESSMENT — PAIN - FUNCTIONAL ASSESSMENT: PAIN_FUNCTIONAL_ASSESSMENT: 0-10

## 2025-07-11 ASSESSMENT — PAIN SCALES - GENERAL
PAINLEVEL_OUTOF10: 8
PAINLEVEL_OUTOF10: 3

## 2025-07-11 NOTE — PROGRESS NOTES
Esther TEJADA Ivory 75 y.o. male presents today with   Chief Complaint   Patient presents with    3 Month Follow-Up    Shortness of Breath     Feels like \" 100lbs brick on chest\". Unable to lay flat because of breathing. Feels bloated. Onset 1 1/2 weeks       Shortness of Breath  This is a new problem. The current episode started in the past 7 days. The problem has been waxing and waning. Pertinent negatives include no chest pain, fever, rash, rhinorrhea or vomiting.       Past Medical History:   Diagnosis Date    Coronary artery disease involving native coronary artery of native heart without angina pectoris 08/23/2022    Degenerative arthritis of knee, bilateral     GERD (gastroesophageal reflux disease)     Hip arthritis     bilateral    Hyperlipidemia     Hypertension     Morbid obesity ([facility]) 08/23/2022    Osteoarthritis of right thumb     Sleep apnea, obstructive 2022    cpap 9 cm     Patient Active Problem List    Diagnosis Date Noted    Chronic systolic (congestive) heart failure 01/09/2023    DAVID (obstructive sleep apnea) 10/10/2022    Morbid obesity ([facility]) 08/23/2022    Coronary artery disease involving native coronary artery of native heart without angina pectoris 08/23/2022    NSVT (nonsustained ventricular tachycardia) ([facility]) 08/16/2022    Ventricular bigeminy 08/08/2022    Chronic renal disease, stage III ([facility]) [418617] 06/27/2022    Pain in left knee 03/11/2019    Unilateral primary osteoarthritis, left knee 03/11/2019    Unilateral primary osteoarthritis, left knee 03/11/2019    Acute bronchitis 01/17/2017     Past Surgical History:   Procedure Laterality Date    CARPAL TUNNEL RELEASE      COLONOSCOPY      KNEE ARTHROPLASTY Left     VASECTOMY       Family History   Problem Relation Age of Onset    Cancer Mother         uterine, breast, skin    Stroke Brother      Social History     Socioeconomic History    Marital status:      Spouse name: None    Number of children: None    Years of education: None

## 2025-07-11 NOTE — ED TRIAGE NOTES
Pt sent to ed from pcp for CP (mid sternal), cough, sob, and new onset a-fib   Pt states the cp has been going on for 1.5 weeks   Pt state his pain is a 8/10  Pt is afebrile

## 2025-07-11 NOTE — H&P
HISTORY AND PHYSICAL             Date: 7/11/2025        Patient Name: Esther Dodson     YOB: 1950      Age:  75 y.o.    Chief Complaint     Chief Complaint   Patient presents with    Shortness of Breath    Irregular Heart Beat     New onset A-fib    Chest Pain     Mid sternal 1.5 weeks     Cough          History Obtained From   patient, electronic medical record    History of Present Illness   Patient is a 75-year-old male who presents to the ED for shortness of breath.  Patient reports symptoms began 7 days ago. he denies chest pain, headache, dizziness, numbness tingling, jaw pain radiation nausea vomiting.  Patient believes symptoms were due to his hernia at first.  He reported feeling bloated at first.  CTA of the chest no evidence of PE.  Small right basilic pleural effusion.  See full report.  EKG A-fib with RVR.  Left anterior fascicular block.  ST and T wave abnormality.  Consider lateral ischemia.  BNP 5108.  Troponins 20 and 21, potassium 5.2.  Patient given diltiazem 20 mg, Lasix 40 mg.  Patient has a past medical history of CAD, hypertension, hyperlipidemia, GERD, and DAVID    Past Medical History     Past Medical History:   Diagnosis Date    Coronary artery disease involving native coronary artery of native heart without angina pectoris 08/23/2022    Degenerative arthritis of knee, bilateral     GERD (gastroesophageal reflux disease)     Hip arthritis     bilateral    Hyperlipidemia     Hypertension     Morbid obesity (HCC) 08/23/2022    Osteoarthritis of right thumb     Sleep apnea, obstructive 2022    cpap 9 cm        Past Surgical History     Past Surgical History:   Procedure Laterality Date    CARPAL TUNNEL RELEASE      COLONOSCOPY      KNEE ARTHROPLASTY Left     VASECTOMY          Medications Prior to Admission     Prior to Admission medications    Medication Sig Start Date End Date Taking? Authorizing Provider   moxifloxacin (VIGAMOX) 0.5 % ophthalmic solution  5/27/25   Provider,  Calculation (Bazett) 479 ms    R Axis -45 degrees    T Axis 124 degrees    Diagnosis       Atrial fibrillation with rapid ventricular response  Left anterior fascicular block  Left ventricular hypertrophy with QRS widening  Cannot rule out Inferior infarct (masked by fascicular block?) , age   undetermined  ST & T wave abnormality, consider lateral ischemia  Abnormal ECG  When compared with ECG of 08-Aug-2022 18:17,  Atrial fibrillation has replaced Sinus rhythm  Minimal criteria for Inferior infarct are now present     Respiratory Panel, Molecular, with COVID-19 (Restricted: peds pts or suitable admitted adults)    Collection Time: 07/11/25 11:51 AM    Specimen: Nasopharyngeal   Result Value Ref Range    Adenovirus by PCR Not Detected Not Detected    Bordetella parapertussis by PCR Not Detected Not Detected    Bordetella pertussis by PCR Not Detected Not Detected    Chlamydophilia pneumoniae by PCR Not Detected Not Detected    Coronavirus 229E by PCR Not Detected Not Detected    Coronavirus HKU1 by PCR Not Detected Not Detected    Coronavirus NL63 by PCR Not Detected Not Detected    Coronavirus OC43 by PCR Not Detected Not Detected    SARS-CoV-2, PCR Not Detected Not Detected    Human Metapneumovirus by PCR Not Detected Not Detected    Human Rhinovirus/Enterovirus by PCR Not Detected Not Detected    Influenza A by PCR Not Detected Not Detected    Influenza B by PCR Not Detected Not Detected    Mycoplasma pneumoniae by PCR Not Detected Not Detected    Parainfluenza Virus 1 by PCR Not Detected Not Detected    Parainfluenza Virus 2 by PCR Not Detected Not Detected    Parainfluenza Virus 3 by PCR Not Detected Not Detected    Parainfluenza Virus 4 by PCR Not Detected Not Detected    Respiratory Syncytial Virus by PCR Not Detected Not Detected   CBC with Auto Differential    Collection Time: 07/11/25 11:58 AM   Result Value Ref Range    WBC 8.8 4.8 - 10.8 K/uL    RBC 4.67 (L) 4.70 - 6.10 M/uL    Hemoglobin 13.6 (L) 14.0

## 2025-07-11 NOTE — FLOWSHEET NOTE
RN notified MD, patient med rec complete.     .Electronically signed by Janki Dennison RN on 7/11/2025 at 6:11 PM

## 2025-07-11 NOTE — ED PROVIDER NOTES
[facility] EMERGENCY DEPARTMENT  Emergency Department Encounter  Emergency Medicine      Pt Name: Etsher Dodson  MRN:[id number]  Birthdate 1950  Date of evaluation: 7/11/25  Time: 11:54 AM EDT  PCP:  Mati Gonzalez MD    CHIEF COMPLAINT       Chief Complaint   Patient presents with    Shortness of Breath    Irregular Heart Beat     New onset A-fib    Chest Pain     Mid sternal 1.5 weeks     Cough       HISTORY OF PRESENT ILLNESS  (Location/Symptom, Timing/Onset, Context/Setting, Quality, Duration, ModifyingFactors, Severity.)      Esther Dodson is a 75 y.o. male with PMH of CAD, hypertension and CHF obesity presents with new onset A-fib.  He was at his PCPs office and was noted to be A-fib and sent here.  Heart rate in the 140s.  Complains of chest pain on and off for the last week and a half.  Also having a cough and some shortness of breath.  No history of DVT or PE.  He is on a blood thinners.  He is compliant with his blood pressure medications.  Noted to have a little bit of leg swelling which she believes is new.  Denies any hemoptysis history of cancer no fevers chills body aches, no abdominal pain no nausea vomiting no urinary complaints    PAST MEDICAL / SURGICAL / SOCIAL /FAMILY HISTORY      has a past medical history of Coronary artery disease involving native coronary artery of native heart without angina pectoris, Degenerative arthritis of knee, bilateral, GERD (gastroesophageal reflux disease), Hip arthritis, Hyperlipidemia, Hypertension, Morbid obesity (HCC), Osteoarthritis of right thumb, and Sleep apnea, obstructive.  No other pertinent PMH on review with patient/guardian.     has a past surgical history that includes Carpal tunnel release; Colonoscopy; Vasectomy; and Knee Arthroplasty (Left).  No other pertinent PSH on review with patient/guardian.  Social History     Socioeconomic History    Marital status:      Spouse name: Not on file    Number of children: Not on file    Years of  Acid   Result Value Ref Range    Lactic Acid 1.3 0.5 - 2.2 mmol/L   EKG 12 Lead   Result Value Ref Range    Ventricular Rate 146 BPM    Atrial Rate 166 BPM    QRS Duration 120 ms    Q-T Interval 308 ms    QTc Calculation (Bazett) 479 ms    R Axis -45 degrees    T Axis 124 degrees    Diagnosis       Atrial fibrillation with rapid ventricular response  Left anterior fascicular block  Left ventricular hypertrophy with QRS widening  Cannot rule out Inferior infarct (masked by fascicular block?) , age   undetermined  ST & T wave abnormality, consider lateral ischemia  Abnormal ECG  When compared with ECG of 08-Aug-2022 18:17,  Atrial fibrillation has replaced Sinus rhythm  Minimal criteria for Inferior infarct are now present           IMPRESSION/MDM/ED COURSE:  75 y.o. male presented with acute palpitations, acute tachycardia  Differential: New onset A-fib, A-fib RVR, electro abnormality, CHF, pneumonia, PE, effusion, appearance DVT, dependent edema, appears ACS not dissection aneurysm not septic  VS: Irregularly irregular tachycardia, mild tachypnea, otherwise normal vital signs    External documentation reviewed: Yes reviewed echo from 2022 showing EF of 65%  - Reviewed cardiac catheterization report from 2022: No significant coronary artery stenosis   -Reviewed outpatient PCP note from earlier today  Impression/Plan: Patient is significantly tachycardic, A-fib RVR, says new onset, will do a Cardizem bolus    After Cardizem bolus his heart rate did normalize but then it is now returning to be tachycardic still in A-fib now RVR again    Rules utilized: [id number] 2 score of 4 for age, CHF, hypertension, diabetes    ED Course as of 07/11/25 1555   Fri Jul 11, 2025   1304 Patient revaluated he was feeling well no complaints at this time, [SF]   1310 EKG my interpretation: A-fib RVR rate 146, otherwise normal intervals, left axis deviation, nonspecific T waves, nonspecific ST segments [SF]   1544 Consulted with

## 2025-07-12 ENCOUNTER — APPOINTMENT (OUTPATIENT)
Age: 75
DRG: 322 | End: 2025-07-12
Attending: STUDENT IN AN ORGANIZED HEALTH CARE EDUCATION/TRAINING PROGRAM
Payer: MEDICARE

## 2025-07-12 LAB
ANION GAP SERPL CALCULATED.3IONS-SCNC: 13 MEQ/L (ref 9–15)
BASOPHILS # BLD: 0 K/UL (ref 0–0.2)
BASOPHILS NFR BLD: 0.4 %
BUN SERPL-MCNC: 22 MG/DL (ref 8–23)
CALCIUM SERPL-MCNC: 8.2 MG/DL (ref 8.5–9.9)
CHLORIDE SERPL-SCNC: 110 MEQ/L (ref 95–107)
CO2 SERPL-SCNC: 22 MEQ/L (ref 20–31)
CREAT SERPL-MCNC: 1.44 MG/DL (ref 0.7–1.2)
ECHO AO ROOT DIAM: 3.1 CM
ECHO AO ROOT INDEX: 1.17 CM/M2
ECHO AV AREA PEAK VELOCITY: 2.9 CM2
ECHO AV AREA VTI: 3.5 CM2
ECHO AV AREA/BSA PEAK VELOCITY: 1.1 CM2/M2
ECHO AV AREA/BSA VTI: 1.3 CM2/M2
ECHO AV CUSP MM: 2.3 CM
ECHO AV MEAN GRADIENT: 3 MMHG
ECHO AV MEAN VELOCITY: 0.8 M/S
ECHO AV PEAK GRADIENT: 4 MMHG
ECHO AV PEAK VELOCITY: 1 M/S
ECHO AV VELOCITY RATIO: 0.8
ECHO AV VTI: 18.9 CM
ECHO BSA: 2.77 M2
ECHO EST RA PRESSURE: 8 MMHG
ECHO LA DIAMETER INDEX: 1.97 CM/M2
ECHO LA DIAMETER: 5.2 CM
ECHO LA TO AORTIC ROOT RATIO: 1.68
ECHO LA VOL A-L A4C: 99 ML (ref 18–58)
ECHO LA VOL MOD A4C: 91 ML (ref 18–58)
ECHO LA VOLUME INDEX A-L A4C: 38 ML/M2 (ref 16–34)
ECHO LA VOLUME INDEX MOD A4C: 34 ML/M2 (ref 16–34)
ECHO LV E' LATERAL VELOCITY: 15.97 CM/S
ECHO LV EF PHYSICIAN: 40 %
ECHO LV FRACTIONAL SHORTENING: 25 % (ref 28–44)
ECHO LV INTERNAL DIMENSION DIASTOLE INDEX: 2.23 CM/M2
ECHO LV INTERNAL DIMENSION DIASTOLIC: 5.9 CM (ref 4.2–5.9)
ECHO LV INTERNAL DIMENSION SYSTOLIC INDEX: 1.67 CM/M2
ECHO LV INTERNAL DIMENSION SYSTOLIC: 4.4 CM
ECHO LV IVSD: 1.3 CM (ref 0.6–1)
ECHO LV IVSS: 1.6 CM
ECHO LV MASS 2D: 305.5 G (ref 88–224)
ECHO LV MASS INDEX 2D: 115.7 G/M2 (ref 49–115)
ECHO LV POSTERIOR WALL DIASTOLIC: 1.1 CM (ref 0.6–1)
ECHO LV POSTERIOR WALL SYSTOLIC: 2.3 CM
ECHO LV RELATIVE WALL THICKNESS RATIO: 0.37
ECHO LVOT AREA: 3.8 CM2
ECHO LVOT AV VTI INDEX: 0.94
ECHO LVOT DIAM: 2.2 CM
ECHO LVOT MEAN GRADIENT: 1 MMHG
ECHO LVOT PEAK GRADIENT: 2 MMHG
ECHO LVOT PEAK VELOCITY: 0.8 M/S
ECHO LVOT STROKE VOLUME INDEX: 25.5 ML/M2
ECHO LVOT SV: 67.2 ML
ECHO LVOT VTI: 17.7 CM
ECHO PV MAX VELOCITY: 0.9 M/S
ECHO PV PEAK GRADIENT: 3 MMHG
ECHO RIGHT VENTRICULAR SYSTOLIC PRESSURE (RVSP): 101 MMHG
ECHO RV INTERNAL DIMENSION: 3.2 CM
ECHO RVOT PEAK GRADIENT: 1 MMHG
ECHO RVOT PEAK VELOCITY: 0.6 M/S
ECHO TV REGURGITANT MAX VELOCITY: 4.81 M/S
ECHO TV REGURGITANT PEAK GRADIENT: 93 MMHG
EOSINOPHIL # BLD: 0.1 K/UL (ref 0–0.7)
EOSINOPHIL NFR BLD: 0.6 %
ERYTHROCYTE [DISTWIDTH] IN BLOOD BY AUTOMATED COUNT: 13.4 % (ref 11.5–14.5)
GLUCOSE SERPL-MCNC: 142 MG/DL (ref 70–99)
HCT VFR BLD AUTO: 41.8 % (ref 42–52)
HGB BLD-MCNC: 14 G/DL (ref 14–18)
LYMPHOCYTES # BLD: 0.7 K/UL (ref 1–4.8)
LYMPHOCYTES NFR BLD: 6.6 %
MCH RBC QN AUTO: 30.3 PG (ref 27–31.3)
MCHC RBC AUTO-ENTMCNC: 33.5 % (ref 33–37)
MCV RBC AUTO: 90.5 FL (ref 79–92.2)
MONOCYTES # BLD: 0.8 K/UL (ref 0.2–0.8)
MONOCYTES NFR BLD: 7.6 %
NEUTROPHILS # BLD: 8.6 K/UL (ref 1.4–6.5)
NEUTS SEG NFR BLD: 84.4 %
PLATELET # BLD AUTO: 220 K/UL (ref 130–400)
POTASSIUM SERPL-SCNC: 3.6 MEQ/L (ref 3.4–4.9)
RBC # BLD AUTO: 4.62 M/UL (ref 4.7–6.1)
SODIUM SERPL-SCNC: 145 MEQ/L (ref 135–144)
TSH REFLEX: 1.31 UIU/ML (ref 0.44–3.86)
WBC # BLD AUTO: 10.2 K/UL (ref 4.8–10.8)

## 2025-07-12 PROCEDURE — 2580000003 HC RX 258: Performed by: STUDENT IN AN ORGANIZED HEALTH CARE EDUCATION/TRAINING PROGRAM

## 2025-07-12 PROCEDURE — C8929 TTE W OR WO FOL WCON,DOPPLER: HCPCS

## 2025-07-12 PROCEDURE — 6370000000 HC RX 637 (ALT 250 FOR IP): Performed by: INTERNAL MEDICINE

## 2025-07-12 PROCEDURE — 6360000004 HC RX CONTRAST MEDICATION: Performed by: STUDENT IN AN ORGANIZED HEALTH CARE EDUCATION/TRAINING PROGRAM

## 2025-07-12 PROCEDURE — 84443 ASSAY THYROID STIM HORMONE: CPT

## 2025-07-12 PROCEDURE — 6360000002 HC RX W HCPCS: Performed by: STUDENT IN AN ORGANIZED HEALTH CARE EDUCATION/TRAINING PROGRAM

## 2025-07-12 PROCEDURE — 2500000003 HC RX 250 WO HCPCS: Performed by: STUDENT IN AN ORGANIZED HEALTH CARE EDUCATION/TRAINING PROGRAM

## 2025-07-12 PROCEDURE — 99223 1ST HOSP IP/OBS HIGH 75: CPT | Performed by: INTERNAL MEDICINE

## 2025-07-12 PROCEDURE — 85025 COMPLETE CBC W/AUTO DIFF WBC: CPT

## 2025-07-12 PROCEDURE — 2060000000 HC ICU INTERMEDIATE R&B

## 2025-07-12 PROCEDURE — 80048 BASIC METABOLIC PNL TOTAL CA: CPT

## 2025-07-12 PROCEDURE — 96366 THER/PROPH/DIAG IV INF ADDON: CPT

## 2025-07-12 PROCEDURE — 96372 THER/PROPH/DIAG INJ SC/IM: CPT

## 2025-07-12 PROCEDURE — 36415 COLL VENOUS BLD VENIPUNCTURE: CPT

## 2025-07-12 PROCEDURE — 6370000000 HC RX 637 (ALT 250 FOR IP): Performed by: STUDENT IN AN ORGANIZED HEALTH CARE EDUCATION/TRAINING PROGRAM

## 2025-07-12 PROCEDURE — 93306 TTE W/DOPPLER COMPLETE: CPT | Performed by: INTERNAL MEDICINE

## 2025-07-12 RX ORDER — AMLODIPINE BESYLATE 5 MG/1
5 TABLET ORAL DAILY
Status: DISCONTINUED | OUTPATIENT
Start: 2025-07-12 | End: 2025-07-12

## 2025-07-12 RX ORDER — METOPROLOL TARTRATE 50 MG
50 TABLET ORAL 2 TIMES DAILY
Status: COMPLETED | OUTPATIENT
Start: 2025-07-12 | End: 2025-07-13

## 2025-07-12 RX ORDER — CARVEDILOL 3.12 MG/1
3.12 TABLET ORAL 2 TIMES DAILY
Status: DISCONTINUED | OUTPATIENT
Start: 2025-07-12 | End: 2025-07-12

## 2025-07-12 RX ORDER — CYCLOBENZAPRINE HCL 10 MG
10 TABLET ORAL 2 TIMES DAILY PRN
Status: DISCONTINUED | OUTPATIENT
Start: 2025-07-12 | End: 2025-07-15 | Stop reason: HOSPADM

## 2025-07-12 RX ORDER — DILTIAZEM HCL 60 MG
60 TABLET ORAL EVERY 6 HOURS SCHEDULED
Status: DISCONTINUED | OUTPATIENT
Start: 2025-07-12 | End: 2025-07-12

## 2025-07-12 RX ORDER — TRAMADOL HYDROCHLORIDE 50 MG/1
50 TABLET ORAL EVERY 6 HOURS PRN
Status: DISCONTINUED | OUTPATIENT
Start: 2025-07-12 | End: 2025-07-15 | Stop reason: HOSPADM

## 2025-07-12 RX ORDER — PANTOPRAZOLE SODIUM 40 MG/1
40 TABLET, DELAYED RELEASE ORAL
Status: DISCONTINUED | OUTPATIENT
Start: 2025-07-12 | End: 2025-07-15 | Stop reason: HOSPADM

## 2025-07-12 RX ADMIN — ENOXAPARIN SODIUM 150 MG: 150 INJECTION SUBCUTANEOUS at 08:36

## 2025-07-12 RX ADMIN — PANTOPRAZOLE SODIUM 40 MG: 40 TABLET, DELAYED RELEASE ORAL at 08:36

## 2025-07-12 RX ADMIN — DILTIAZEM HYDROCHLORIDE 13.5 MG/HR: 5 INJECTION, SOLUTION INTRAVENOUS at 11:54

## 2025-07-12 RX ADMIN — CARVEDILOL 3.12 MG: 3.12 TABLET, FILM COATED ORAL at 08:36

## 2025-07-12 RX ADMIN — DILTIAZEM HYDROCHLORIDE 60 MG: 60 TABLET ORAL at 13:43

## 2025-07-12 RX ADMIN — DILTIAZEM HYDROCHLORIDE 13 MG/HR: 5 INJECTION, SOLUTION INTRAVENOUS at 01:26

## 2025-07-12 RX ADMIN — DILTIAZEM HYDROCHLORIDE 60 MG: 60 TABLET ORAL at 17:52

## 2025-07-12 RX ADMIN — SODIUM CHLORIDE, PRESERVATIVE FREE 10 ML: 5 INJECTION INTRAVENOUS at 21:29

## 2025-07-12 RX ADMIN — SODIUM CHLORIDE, PRESERVATIVE FREE 10 ML: 5 INJECTION INTRAVENOUS at 08:37

## 2025-07-12 RX ADMIN — METOPROLOL TARTRATE 50 MG: 50 TABLET, FILM COATED ORAL at 18:18

## 2025-07-12 RX ADMIN — ENOXAPARIN SODIUM 150 MG: 150 INJECTION SUBCUTANEOUS at 21:29

## 2025-07-12 RX ADMIN — SULFUR HEXAFLUORIDE 2 ML: KIT at 08:06

## 2025-07-12 RX ADMIN — AMLODIPINE BESYLATE 5 MG: 5 TABLET ORAL at 08:36

## 2025-07-12 NOTE — CONSULTS
[place], [place]    CARDIOLOGY CONSULTATION REPORT    DATE OF CONSULTATION  7/12/2025    CONSULTANT  Godwin Christopher DO     REQUESTING PHYSICIAN  Angella Horan MD     PRIMARY CARDIOLOGIST  None    REASON FOR CONSULTATION  New atrial fibrillation      HISTORY OF PRESENT ILLNESS  Esther Dodson is a 75 y.o. male with history of hypertension, mild CAD, PVCs, and DAVID not on CPAP who was referred to ER from his PCPs office with newly recognized atrial fibrillation and RVR.  He was sent to ER and started on IV Cardizem.  Currently, he reports midsternal chest \"heaviness\" and shortness of breath with activity worsening over the last week.  Prior to a week ago he was not having any significant exertional dyspnea.  He denies any palpitations from A-fib.  No near-syncope or syncope.  He has history of sleep apnea and previously recommended to start CPAP however for unclear reasons this was never done.  He denies any prior history of TIA or CVA.  No bleeding problems.    Allergies  No Known Allergies    Medications   carvedilol, 3.125 mg, BID  pantoprazole, 40 mg, QAM AC  dilTIAZem, 60 mg, 4 times per day  sodium chloride flush, 5-40 mL, 2 times per day  enoxaparin, 1 mg/kg, BID        Past Medical History:   Diagnosis Date    Coronary artery disease involving native coronary artery of native heart without angina pectoris 08/23/2022    Degenerative arthritis of knee, bilateral     GERD (gastroesophageal reflux disease)     Hip arthritis     bilateral    Hyperlipidemia     Hypertension     Morbid obesity (HCC) 08/23/2022    Osteoarthritis of right thumb     Sleep apnea, obstructive 2022    cpap 9 cm      Past Surgical History:   Procedure Laterality Date    CARPAL TUNNEL RELEASE      COLONOSCOPY      KNEE ARTHROPLASTY Left     VASECTOMY        Social History     Tobacco Use    Smoking status: Never    Smokeless tobacco: Never    Tobacco comments:     cigars, weekly   Substance Use Topics    Alcohol use: Yes

## 2025-07-12 NOTE — ACP (ADVANCE CARE PLANNING)
Advance Care Planning   Healthcare Decision Maker:    Primary Decision Maker: Elzbieta Dodson - [facility] - [phone]    Click here to complete Healthcare Decision Makers including selection of the Healthcare Decision Maker Relationship (ie \"Primary\").  Today we documented Decision Maker(s) consistent with Legal Next of Kin hierarchy.

## 2025-07-12 NOTE — PLAN OF CARE
Problem: Chronic Conditions and Co-morbidities  Goal: Patient's chronic conditions and co-morbidity symptoms are monitored and maintained or improved  7/11/2025 2229 by Ryne Marie RN  Outcome: Progressing  7/11/2025 1809 by Janki Dennison RN  Outcome: Progressing     Problem: Discharge Planning  Goal: Discharge to home or other facility with appropriate resources  7/11/2025 2229 by Ryne Marie RN  Outcome: Progressing  7/11/2025 1809 by Janki Dennison RN  Outcome: Progressing     Problem: Pain  Goal: Verbalizes/displays adequate comfort level or baseline comfort level  7/11/2025 2229 by Ryne Marie RN  Outcome: Progressing  7/11/2025 1809 by Janki Dennison RN  Outcome: Progressing

## 2025-07-12 NOTE — CARE COORDINATION
Case Management Assessment  Initial Evaluation    Date/Time of Evaluation: 7/12/2025 4:04 PM  Assessment Completed by: Pam Patel    If patient is discharged prior to next notation, then this note serves as note for discharge by case management.    Patient Name: Esther Dodson                   YOB: 1950  Diagnosis: Renal cyst [N28.1]  New onset a-fib (HCC) [I48.91]  Pleural effusion, right [J90]  Atrial fibrillation with rapid ventricular response (HCC) [I48.91]  Atrial fibrillation with RVR (HCC) [I48.91]  Atrial fibrillation, unspecified type (HCC) [I48.91]  Chronic kidney disease, unspecified CKD stage [N18.9]                   Date / Time: 7/11/2025 12:29 PM    Patient Admission Status: Inpatient   Readmission Risk (Low < 19, Mod (19-27), High > 27): No data recorded  Current PCP: Mati Gonzalez MD  PCP verified by CM? Yes    Chart Reviewed: Yes      History Provided by: Patient  Patient Orientation: Alert and Oriented, Person, Place, Situation, Self    Patient Cognition: Alert    Hospitalization in the last 30 days (Readmission):  No    If yes, Readmission Assessment in CM Navigator will be completed.    Advance Directives:      Code Status: Full Code   Patient's Primary Decision Maker is: Legal Next of Kin    Primary Decision Maker: Elzbieta Dodson - Spouse - [phone]    Discharge Planning:    Patient lives with: Spouse/Significant Other Type of Home: House  Primary Care Giver: Self  Patient Support Systems include: Spouse/Significant Other   Current Financial resources:    Current community resources:    Current services prior to admission: None            Current DME:              Type of Home Care services:  None    ADLS  Prior functional level: Independent in ADLs/IADLs  Current functional level: Independent in ADLs/IADLs    PT AM-PAC:   /24  OT AM-PAC:   /24    Family can provide assistance at DC: Yes  Would you like Case Management to discuss the discharge plan with any other family

## 2025-07-13 PROBLEM — I42.0 DCM (DILATED CARDIOMYOPATHY) (HCC): Status: ACTIVE | Noted: 2025-07-13

## 2025-07-13 LAB
ANION GAP SERPL CALCULATED.3IONS-SCNC: 10 MEQ/L (ref 9–15)
BUN SERPL-MCNC: 23 MG/DL (ref 8–23)
CALCIUM SERPL-MCNC: 8.4 MG/DL (ref 8.5–9.9)
CHLORIDE SERPL-SCNC: 107 MEQ/L (ref 95–107)
CO2 SERPL-SCNC: 22 MEQ/L (ref 20–31)
CREAT SERPL-MCNC: 1.66 MG/DL (ref 0.7–1.2)
EKG ATRIAL RATE: 166 BPM
EKG DIAGNOSIS: NORMAL
EKG Q-T INTERVAL: 308 MS
EKG QRS DURATION: 120 MS
EKG QTC CALCULATION (BAZETT): 479 MS
EKG R AXIS: -45 DEGREES
EKG T AXIS: 124 DEGREES
EKG VENTRICULAR RATE: 146 BPM
GLUCOSE SERPL-MCNC: 97 MG/DL (ref 70–99)
MAGNESIUM SERPL-MCNC: 2 MG/DL (ref 1.7–2.4)
POTASSIUM SERPL-SCNC: 3.5 MEQ/L (ref 3.4–4.9)
SODIUM SERPL-SCNC: 139 MEQ/L (ref 135–144)

## 2025-07-13 PROCEDURE — 36415 COLL VENOUS BLD VENIPUNCTURE: CPT

## 2025-07-13 PROCEDURE — 93005 ELECTROCARDIOGRAM TRACING: CPT | Performed by: INTERNAL MEDICINE

## 2025-07-13 PROCEDURE — 6360000002 HC RX W HCPCS: Performed by: INTERNAL MEDICINE

## 2025-07-13 PROCEDURE — 2580000003 HC RX 258: Performed by: INTERNAL MEDICINE

## 2025-07-13 PROCEDURE — 80048 BASIC METABOLIC PNL TOTAL CA: CPT

## 2025-07-13 PROCEDURE — 6370000000 HC RX 637 (ALT 250 FOR IP): Performed by: STUDENT IN AN ORGANIZED HEALTH CARE EDUCATION/TRAINING PROGRAM

## 2025-07-13 PROCEDURE — 2500000003 HC RX 250 WO HCPCS: Performed by: INTERNAL MEDICINE

## 2025-07-13 PROCEDURE — 83735 ASSAY OF MAGNESIUM: CPT

## 2025-07-13 PROCEDURE — 2500000003 HC RX 250 WO HCPCS: Performed by: STUDENT IN AN ORGANIZED HEALTH CARE EDUCATION/TRAINING PROGRAM

## 2025-07-13 PROCEDURE — 2060000000 HC ICU INTERMEDIATE R&B

## 2025-07-13 PROCEDURE — 99233 SBSQ HOSP IP/OBS HIGH 50: CPT | Performed by: INTERNAL MEDICINE

## 2025-07-13 PROCEDURE — 6360000002 HC RX W HCPCS: Performed by: STUDENT IN AN ORGANIZED HEALTH CARE EDUCATION/TRAINING PROGRAM

## 2025-07-13 PROCEDURE — 6370000000 HC RX 637 (ALT 250 FOR IP): Performed by: INTERNAL MEDICINE

## 2025-07-13 RX ORDER — METOPROLOL SUCCINATE 50 MG/1
50 TABLET, EXTENDED RELEASE ORAL DAILY
Status: DISCONTINUED | OUTPATIENT
Start: 2025-07-14 | End: 2025-07-15 | Stop reason: HOSPADM

## 2025-07-13 RX ORDER — SODIUM CHLORIDE 9 MG/ML
INJECTION, SOLUTION INTRAVENOUS CONTINUOUS
Status: DISCONTINUED | OUTPATIENT
Start: 2025-07-13 | End: 2025-07-15 | Stop reason: HOSPADM

## 2025-07-13 RX ADMIN — SODIUM CHLORIDE: 0.9 INJECTION, SOLUTION INTRAVENOUS at 18:38

## 2025-07-13 RX ADMIN — SODIUM CHLORIDE, PRESERVATIVE FREE 10 ML: 5 INJECTION INTRAVENOUS at 20:42

## 2025-07-13 RX ADMIN — AMIODARONE HYDROCHLORIDE 1 MG/MIN: 50 INJECTION, SOLUTION INTRAVENOUS at 18:46

## 2025-07-13 RX ADMIN — AMIODARONE HYDROCHLORIDE 150 MG: 1.5 INJECTION, SOLUTION INTRAVENOUS at 18:37

## 2025-07-13 RX ADMIN — METOPROLOL TARTRATE 50 MG: 50 TABLET, FILM COATED ORAL at 08:05

## 2025-07-13 RX ADMIN — PANTOPRAZOLE SODIUM 40 MG: 40 TABLET, DELAYED RELEASE ORAL at 06:34

## 2025-07-13 RX ADMIN — ENOXAPARIN SODIUM 150 MG: 150 INJECTION SUBCUTANEOUS at 08:05

## 2025-07-13 RX ADMIN — METOPROLOL TARTRATE 50 MG: 50 TABLET, FILM COATED ORAL at 20:42

## 2025-07-13 NOTE — PROGRESS NOTES
Hospitalist Progress Note      PCP: Mati Gonzalez MD    Date of Admission: 7/11/2025    Chief Complaint:    Chief Complaint   Patient presents with    Shortness of Breath    Irregular Heart Beat     New onset A-fib    Chest Pain     Mid sternal 1.5 weeks     Cough     Subjective:  No acute events overnight. No acute complaints. Denies chest pain or shortness of breath.    Medications:  Reviewed    Infusion Medications    sodium chloride       Scheduled Medications    pantoprazole  40 mg Oral QAM AC    metoprolol tartrate  50 mg Oral BID    sodium chloride flush  5-40 mL IntraVENous 2 times per day    enoxaparin  1 mg/kg SubCUTAneous BID     PRN Meds: cyclobenzaprine, traMADol, sodium chloride flush, sodium chloride, potassium chloride **OR** potassium alternative oral replacement **OR** potassium chloride, magnesium sulfate, ondansetron **OR** ondansetron, polyethylene glycol, acetaminophen **OR** acetaminophen      Intake/Output Summary (Last 24 hours) at 7/13/2025 1650  Last data filed at 7/13/2025 1238  Gross per 24 hour   Intake 670 ml   Output --   Net 670 ml     Exam:  BP (!) 113/93   Pulse (!) 107   Temp 98.4 °F (36.9 °C) (Oral)   Resp 17   Ht 1.829 m (6' 0.01\")   Wt (!) 149 kg (328 lb 7.8 oz)   SpO2 94%   BMI 44.54 kg/m²   Physical Exam  Cardiovascular:      Rate and Rhythm: Tachycardia present. Rhythm irregular.   Pulmonary:      Effort: Pulmonary effort is normal. No respiratory distress.   Abdominal:      Palpations: Abdomen is soft.      Tenderness: There is no abdominal tenderness.   Neurological:      Mental Status: He is alert and oriented to person, place, and time.   Psychiatric:         Mood and Affect: Mood normal.         Behavior: Behavior normal.       Labs:   Recent Labs     07/11/25  1158 07/12/25  0951   WBC 8.8 10.2   HGB 13.6* 14.0   HCT 42.9 41.8*    220     Recent Labs     07/11/25  1158 07/12/25  0951 07/13/25  0523    145* 139   K 5.2* 3.6 3.5   * 110* 107  English

## 2025-07-13 NOTE — PROGRESS NOTES
Cardiology Follow up Note    Subjective:  No CP or SOB. No palpitations despite Afib with RVR on telemetry. -130s but spike up to 160s walking to bathroom.     New LV dysfunction on echocardiogram, EF 35-40%      Review of Systems:   As noted in the HPI*    Objective:  BP (!) 113/93   Pulse (!) 107   Temp 98.4 °F (36.9 °C) (Oral)   Resp 17   Ht 1.829 m (6' 0.01\")   Wt (!) 149 kg (328 lb 7.8 oz)   SpO2 94%   BMI 44.54 kg/m²   Vitals stable.   Constitutional: alert, cooperative, in no distress  Eyes: Conjunctiva clear; no scleral icturus. PERRLA   Respiratory: clear to auscultation bilaterally  Musculoskeletal: No chest wall tenderness. No clubbing or cyanosis.   Cardiovascular: regular rate and rhythm, S1, S2 normal, no murmur, click, rub or gallop. No carotid bruit. No JVD. Pulses 2+ and symmetric.   GI: soft, non-tender, non-distended. Bowel sounds normal. No masses,  no organomegaly  MSK: extremities normal, atraumatic, no clubbing. No chest wall pain.    Neurologic: Cranial nerves grossly intact.  No focal motor and/or sensory deficits.  Skin: No rashes or lesions. No cyanosis.       Intake/Output Summary (Last 24 hours) at 7/13/2025 1754  Last data filed at 7/13/2025 1238  Gross per 24 hour   Intake 670 ml   Output --   Net 670 ml       Medications:  pantoprazole, 40 mg, QAM AC  metoprolol tartrate, 50 mg, BID  sodium chloride flush, 5-40 mL, 2 times per day  enoxaparin, 1 mg/kg, BID      sodium chloride      Recent Labs     07/11/25  1158 07/11/25  1201 07/12/25  0951 07/13/25  0523   HGB 13.6*  --  14.0  --    WBC 8.8  --  10.2  --      --  220  --      --  145* 139   K 5.2*  --  3.6 3.5   CO2 15*  --  22 22   BUN 25*  --  22 23   MG 2.1  --   --  2.0   INR  --  1.1  --   --      Cr 1.66    Telemetry: Afib with RVR on telemetry. -130s but spike up to 160s walking to bathroom.       Echo (TTE) complete (PRN contrast/bubble/strain/3D) 07/12/2025  Interpretation Summary    Left

## 2025-07-13 NOTE — PROGRESS NOTES
aware pts been afib RVR 130s    1718 patient HR between 130s-150s. Asymptomatic. Awaiting orders.

## 2025-07-13 NOTE — PROGRESS NOTES
Hospitalist Progress Note      PCP: Mati Gonzalez MD    Date of Admission: 7/11/2025    Chief Complaint:    Chief Complaint   Patient presents with    Shortness of Breath    Irregular Heart Beat     New onset A-fib    Chest Pain     Mid sternal 1.5 weeks     Cough     Subjective:  No acute events overnight. Pt resting comfortably in bed. No acute complaints. Denies chest pain or shortness of breath.    Medications:  Reviewed    Infusion Medications    sodium chloride       Scheduled Medications    pantoprazole  40 mg Oral QAM AC    metoprolol tartrate  50 mg Oral BID    sodium chloride flush  5-40 mL IntraVENous 2 times per day    enoxaparin  1 mg/kg SubCUTAneous BID     PRN Meds: cyclobenzaprine, traMADol, sodium chloride flush, sodium chloride, potassium chloride **OR** potassium alternative oral replacement **OR** potassium chloride, magnesium sulfate, ondansetron **OR** ondansetron, polyethylene glycol, acetaminophen **OR** acetaminophen      Intake/Output Summary (Last 24 hours) at 7/12/2025 2338  Last data filed at 7/12/2025 2129  Gross per 24 hour   Intake 10 ml   Output --   Net 10 ml     Exam:  /78   Pulse 90   Temp 97.9 °F (36.6 °C) (Oral)   Resp 18   Ht 1.829 m (6' 0.01\")   Wt (!) 150.5 kg (331 lb 12.7 oz)   SpO2 94%   BMI 44.99 kg/m²   Physical Exam  Cardiovascular:      Rate and Rhythm: Normal rate. Rhythm irregular.   Pulmonary:      Effort: Pulmonary effort is normal. No respiratory distress.   Abdominal:      Palpations: Abdomen is soft.      Tenderness: There is no abdominal tenderness.   Neurological:      Mental Status: He is alert and oriented to person, place, and time.   Psychiatric:         Mood and Affect: Mood normal.         Behavior: Behavior normal.       Labs:   Recent Labs     07/11/25  1158 07/12/25  0951   WBC 8.8 10.2   HGB 13.6* 14.0   HCT 42.9 41.8*    220     Recent Labs     07/11/25  1158 07/12/25  0951    145*   K 5.2* 3.6   * 110*   CO2 15* 22

## 2025-07-14 ENCOUNTER — APPOINTMENT (OUTPATIENT)
Age: 75
DRG: 322 | End: 2025-07-14
Payer: MEDICARE

## 2025-07-14 LAB
ANION GAP SERPL CALCULATED.3IONS-SCNC: 14 MEQ/L (ref 9–15)
BUN SERPL-MCNC: 23 MG/DL (ref 8–23)
CALCIUM SERPL-MCNC: 8.5 MG/DL (ref 8.5–9.9)
CHLORIDE SERPL-SCNC: 106 MEQ/L (ref 95–107)
CO2 SERPL-SCNC: 20 MEQ/L (ref 20–31)
CREAT SERPL-MCNC: 1.63 MG/DL (ref 0.7–1.2)
ECHO AO ROOT DIAM: 3.8 CM
ECHO AO ROOT INDEX: 1.44 CM/M2
ECHO AV AREA PLAN/BSA: 1.37 CM2/M2
ECHO AV AREA PLAN: 3.6 CM2
ECHO BSA: 2.77 M2
ECHO BSA: 2.77 M2
ECHO LVOT AREA: 3.1 CM2
ECHO LVOT DIAM: 2 CM
EKG ATRIAL RATE: 133 BPM
EKG ATRIAL RATE: 156 BPM
EKG ATRIAL RATE: 58 BPM
EKG DIAGNOSIS: NORMAL
EKG P AXIS: 65 DEGREES
EKG P-R INTERVAL: 264 MS
EKG Q-T INTERVAL: 284 MS
EKG Q-T INTERVAL: 302 MS
EKG Q-T INTERVAL: 446 MS
EKG QRS DURATION: 124 MS
EKG QRS DURATION: 130 MS
EKG QRS DURATION: 144 MS
EKG QTC CALCULATION (BAZETT): 421 MS
EKG QTC CALCULATION (BAZETT): 424 MS
EKG QTC CALCULATION (BAZETT): 437 MS
EKG R AXIS: -43 DEGREES
EKG R AXIS: -43 DEGREES
EKG R AXIS: -47 DEGREES
EKG T AXIS: 113 DEGREES
EKG T AXIS: 116 DEGREES
EKG T AXIS: 84 DEGREES
EKG VENTRICULAR RATE: 117 BPM
EKG VENTRICULAR RATE: 134 BPM
EKG VENTRICULAR RATE: 58 BPM
ERYTHROCYTE [DISTWIDTH] IN BLOOD BY AUTOMATED COUNT: 13.4 % (ref 11.5–14.5)
GLUCOSE SERPL-MCNC: 110 MG/DL (ref 70–99)
HCT VFR BLD AUTO: 44.4 % (ref 42–52)
HGB BLD-MCNC: 14.5 G/DL (ref 14–18)
MAGNESIUM SERPL-MCNC: 2 MG/DL (ref 1.7–2.4)
MCH RBC QN AUTO: 29.4 PG (ref 27–31.3)
MCHC RBC AUTO-ENTMCNC: 32.7 % (ref 33–37)
MCV RBC AUTO: 89.9 FL (ref 79–92.2)
PLATELET # BLD AUTO: 214 K/UL (ref 130–400)
POTASSIUM SERPL-SCNC: 3.3 MEQ/L (ref 3.4–4.9)
RBC # BLD AUTO: 4.94 M/UL (ref 4.7–6.1)
SODIUM SERPL-SCNC: 140 MEQ/L (ref 135–144)
WBC # BLD AUTO: 11.1 K/UL (ref 4.8–10.8)

## 2025-07-14 PROCEDURE — 5A2204Z RESTORATION OF CARDIAC RHYTHM, SINGLE: ICD-10-PCS | Performed by: INTERNAL MEDICINE

## 2025-07-14 PROCEDURE — 4A023N7 MEASUREMENT OF CARDIAC SAMPLING AND PRESSURE, LEFT HEART, PERCUTANEOUS APPROACH: ICD-10-PCS | Performed by: INTERNAL MEDICINE

## 2025-07-14 PROCEDURE — 6370000000 HC RX 637 (ALT 250 FOR IP): Performed by: STUDENT IN AN ORGANIZED HEALTH CARE EDUCATION/TRAINING PROGRAM

## 2025-07-14 PROCEDURE — 99233 SBSQ HOSP IP/OBS HIGH 50: CPT | Performed by: INTERNAL MEDICINE

## 2025-07-14 PROCEDURE — 93010 ELECTROCARDIOGRAM REPORT: CPT | Performed by: INTERNAL MEDICINE

## 2025-07-14 PROCEDURE — 93005 ELECTROCARDIOGRAM TRACING: CPT | Performed by: INTERNAL MEDICINE

## 2025-07-14 PROCEDURE — 7100000011 HC PHASE II RECOVERY - ADDTL 15 MIN

## 2025-07-14 PROCEDURE — 7100000010 HC PHASE II RECOVERY - FIRST 15 MIN: Performed by: INTERNAL MEDICINE

## 2025-07-14 PROCEDURE — 99152 MOD SED SAME PHYS/QHP 5/>YRS: CPT | Performed by: INTERNAL MEDICINE

## 2025-07-14 PROCEDURE — 93325 DOPPLER ECHO COLOR FLOW MAPG: CPT

## 2025-07-14 PROCEDURE — 2580000003 HC RX 258: Performed by: INTERNAL MEDICINE

## 2025-07-14 PROCEDURE — 92928 PRQ TCAT PLMT NTRAC ST 1 LES: CPT | Performed by: INTERNAL MEDICINE

## 2025-07-14 PROCEDURE — 027034Z DILATION OF CORONARY ARTERY, ONE ARTERY WITH DRUG-ELUTING INTRALUMINAL DEVICE, PERCUTANEOUS APPROACH: ICD-10-PCS | Performed by: INTERNAL MEDICINE

## 2025-07-14 PROCEDURE — 7100000011 HC PHASE II RECOVERY - ADDTL 15 MIN: Performed by: INTERNAL MEDICINE

## 2025-07-14 PROCEDURE — C1725 CATH, TRANSLUMIN NON-LASER: HCPCS | Performed by: INTERNAL MEDICINE

## 2025-07-14 PROCEDURE — 93458 L HRT ARTERY/VENTRICLE ANGIO: CPT | Performed by: INTERNAL MEDICINE

## 2025-07-14 PROCEDURE — 2709999900 HC NON-CHARGEABLE SUPPLY: Performed by: INTERNAL MEDICINE

## 2025-07-14 PROCEDURE — B2111ZZ FLUOROSCOPY OF MULTIPLE CORONARY ARTERIES USING LOW OSMOLAR CONTRAST: ICD-10-PCS | Performed by: INTERNAL MEDICINE

## 2025-07-14 PROCEDURE — 92960 CARDIOVERSION ELECTRIC EXT: CPT

## 2025-07-14 PROCEDURE — 99153 MOD SED SAME PHYS/QHP EA: CPT | Performed by: INTERNAL MEDICINE

## 2025-07-14 PROCEDURE — 36415 COLL VENOUS BLD VENIPUNCTURE: CPT

## 2025-07-14 PROCEDURE — C1769 GUIDE WIRE: HCPCS | Performed by: INTERNAL MEDICINE

## 2025-07-14 PROCEDURE — 6360000002 HC RX W HCPCS: Performed by: INTERNAL MEDICINE

## 2025-07-14 PROCEDURE — 6360000004 HC RX CONTRAST MEDICATION: Performed by: INTERNAL MEDICINE

## 2025-07-14 PROCEDURE — 6370000000 HC RX 637 (ALT 250 FOR IP): Performed by: INTERNAL MEDICINE

## 2025-07-14 PROCEDURE — 7100000010 HC PHASE II RECOVERY - FIRST 15 MIN

## 2025-07-14 PROCEDURE — 2060000000 HC ICU INTERMEDIATE R&B

## 2025-07-14 PROCEDURE — C1887 CATHETER, GUIDING: HCPCS | Performed by: INTERNAL MEDICINE

## 2025-07-14 PROCEDURE — 83735 ASSAY OF MAGNESIUM: CPT

## 2025-07-14 PROCEDURE — C1894 INTRO/SHEATH, NON-LASER: HCPCS | Performed by: INTERNAL MEDICINE

## 2025-07-14 PROCEDURE — B2151ZZ FLUOROSCOPY OF LEFT HEART USING LOW OSMOLAR CONTRAST: ICD-10-PCS | Performed by: INTERNAL MEDICINE

## 2025-07-14 PROCEDURE — C1874 STENT, COATED/COV W/DEL SYS: HCPCS | Performed by: INTERNAL MEDICINE

## 2025-07-14 PROCEDURE — B24BZZ4 ULTRASONOGRAPHY OF HEART WITH AORTA, TRANSESOPHAGEAL: ICD-10-PCS | Performed by: INTERNAL MEDICINE

## 2025-07-14 PROCEDURE — 85027 COMPLETE CBC AUTOMATED: CPT

## 2025-07-14 PROCEDURE — 80048 BASIC METABOLIC PNL TOTAL CA: CPT

## 2025-07-14 PROCEDURE — 92960 CARDIOVERSION ELECTRIC EXT: CPT | Performed by: INTERNAL MEDICINE

## 2025-07-14 PROCEDURE — 2500000003 HC RX 250 WO HCPCS: Performed by: STUDENT IN AN ORGANIZED HEALTH CARE EDUCATION/TRAINING PROGRAM

## 2025-07-14 DEVICE — STENT ONYXNG35038UX ONYX 3.50X38RX
Type: IMPLANTABLE DEVICE | Status: FUNCTIONAL
Brand: ONYX FRONTIER™

## 2025-07-14 RX ORDER — SODIUM CHLORIDE 9 MG/ML
INJECTION, SOLUTION INTRAVENOUS CONTINUOUS
Status: ACTIVE | OUTPATIENT
Start: 2025-07-14 | End: 2025-07-14

## 2025-07-14 RX ORDER — SODIUM CHLORIDE 0.9 % (FLUSH) 0.9 %
5-40 SYRINGE (ML) INJECTION EVERY 12 HOURS SCHEDULED
Status: DISCONTINUED | OUTPATIENT
Start: 2025-07-14 | End: 2025-07-14 | Stop reason: HOSPADM

## 2025-07-14 RX ORDER — IOPAMIDOL 612 MG/ML
INJECTION, SOLUTION INTRAVASCULAR PRN
Status: DISCONTINUED | OUTPATIENT
Start: 2025-07-14 | End: 2025-07-14 | Stop reason: HOSPADM

## 2025-07-14 RX ORDER — CLOPIDOGREL BISULFATE 75 MG/1
TABLET ORAL PRN
Status: DISCONTINUED | OUTPATIENT
Start: 2025-07-14 | End: 2025-07-14 | Stop reason: HOSPADM

## 2025-07-14 RX ORDER — CLOPIDOGREL BISULFATE 75 MG/1
75 TABLET ORAL DAILY
Status: DISCONTINUED | OUTPATIENT
Start: 2025-07-15 | End: 2025-07-15 | Stop reason: HOSPADM

## 2025-07-14 RX ORDER — SODIUM CHLORIDE 0.9 % (FLUSH) 0.9 %
5-40 SYRINGE (ML) INJECTION PRN
Status: DISCONTINUED | OUTPATIENT
Start: 2025-07-14 | End: 2025-07-14 | Stop reason: HOSPADM

## 2025-07-14 RX ORDER — MIDAZOLAM HYDROCHLORIDE 1 MG/ML
INJECTION, SOLUTION INTRAMUSCULAR; INTRAVENOUS PRN
Status: DISCONTINUED | OUTPATIENT
Start: 2025-07-14 | End: 2025-07-14 | Stop reason: HOSPADM

## 2025-07-14 RX ORDER — SODIUM CHLORIDE 9 MG/ML
INJECTION, SOLUTION INTRAVENOUS PRN
Status: DISCONTINUED | OUTPATIENT
Start: 2025-07-14 | End: 2025-07-14 | Stop reason: HOSPADM

## 2025-07-14 RX ORDER — NITROGLYCERIN 20 MG/100ML
INJECTION INTRAVENOUS PRN
Status: DISCONTINUED | OUTPATIENT
Start: 2025-07-14 | End: 2025-07-14 | Stop reason: HOSPADM

## 2025-07-14 RX ORDER — HEPARIN SODIUM 1000 [USP'U]/ML
INJECTION, SOLUTION INTRAVENOUS; SUBCUTANEOUS PRN
Status: DISCONTINUED | OUTPATIENT
Start: 2025-07-14 | End: 2025-07-14 | Stop reason: HOSPADM

## 2025-07-14 RX ORDER — ASPIRIN 81 MG/1
TABLET, CHEWABLE ORAL PRN
Status: DISCONTINUED | OUTPATIENT
Start: 2025-07-14 | End: 2025-07-14 | Stop reason: HOSPADM

## 2025-07-14 RX ORDER — ASPIRIN 81 MG/1
81 TABLET ORAL DAILY
Status: DISCONTINUED | OUTPATIENT
Start: 2025-07-15 | End: 2025-07-15 | Stop reason: HOSPADM

## 2025-07-14 RX ORDER — ENOXAPARIN SODIUM 150 MG/ML
1 INJECTION SUBCUTANEOUS 2 TIMES DAILY
Status: DISCONTINUED | OUTPATIENT
Start: 2025-07-14 | End: 2025-07-15

## 2025-07-14 RX ORDER — FENTANYL CITRATE 50 UG/ML
INJECTION, SOLUTION INTRAMUSCULAR; INTRAVENOUS PRN
Status: DISCONTINUED | OUTPATIENT
Start: 2025-07-14 | End: 2025-07-14 | Stop reason: HOSPADM

## 2025-07-14 RX ADMIN — AMIODARONE HYDROCHLORIDE 0.5 MG/MIN: 50 INJECTION, SOLUTION INTRAVENOUS at 01:43

## 2025-07-14 RX ADMIN — SODIUM CHLORIDE, PRESERVATIVE FREE 10 ML: 5 INJECTION INTRAVENOUS at 21:48

## 2025-07-14 RX ADMIN — METOPROLOL SUCCINATE 50 MG: 50 TABLET, EXTENDED RELEASE ORAL at 07:52

## 2025-07-14 RX ADMIN — AMIODARONE HYDROCHLORIDE 0.5 MG/MIN: 50 INJECTION, SOLUTION INTRAVENOUS at 19:26

## 2025-07-14 RX ADMIN — PANTOPRAZOLE SODIUM 40 MG: 40 TABLET, DELAYED RELEASE ORAL at 06:35

## 2025-07-14 RX ADMIN — ENOXAPARIN SODIUM 150 MG: 150 INJECTION SUBCUTANEOUS at 18:17

## 2025-07-14 NOTE — BRIEF OP NOTE
Section of Cardiology  Adult Brief AMAIRANI Procedure Note        Procedure(s):  AMAIRANI, cardioversion    Pre-operative Diagnosis: Atrial fibrillation    H&P Status: Completed and reviewed.     Post-operative Diagnosis:      AMAIRANI  EF of 40%.  Global hypokinesis  No mass thrombus or vegetation  No PFO with bubble study  Mild mitral and tricuspid regurgitation  Mild aortic plaque      External cardioversion  Successful external cardioversion into normal sinus rhythm with 300 J synchronized shock x 1      Findings:  See full report    Complications:  none    Primary Proceduralist:   Dr.Wes Puri DO    Plan:    Continue with anticoagulation  Continue with amiodarone drip  Left heart catheterization to follow  Full procedure note to follow

## 2025-07-14 NOTE — BRIEF OP NOTE
Section of Cardiology  Adult Brief Cardiac Cath Procedure Note        Procedure(s):  LHC, b/l coronary angio, PCI of the mid LAD with drug-eluting stent x 1, 3.5 x 38 mm postdilatation with a 4.0 x 15 NC balloon    Pre-operative Diagnosis: Cardiomyopathy    H&P Status: Completed and reviewed.     Post-operative Diagnosis:      LV ejection fraction of 30% global hypokinesis  Left main large caliber.  Mild disease  LAD large Vessel.  70 to 80% mid stenosis.  99% distal apex  Circumflex large caliber.  Mild diffuse disease  RCA large caliber vessel.  Dominant.  Mild diffuse disease    Findings:  See full report    Estimated blood loss    Complications:  none    Primary Proceduralist:   Dr.Wes Puri DO    Plan:  Maximize medical therapy  Risk factor modification  Dual antiplatelet therapy  Resume Lovenox  Resume amiodarone  Full procedure note to follow

## 2025-07-14 NOTE — PROGRESS NOTES
Hospitalist Progress Note      PCP: Mati Gonzalez MD    Date of Admission: 7/11/2025    Chief Complaint:    Chief Complaint   Patient presents with    Shortness of Breath    Irregular Heart Beat     New onset A-fib    Chest Pain     Mid sternal 1.5 weeks     Cough       Subjective:  Patient denies fevers, chills, sweats, CP, SOB, diarrhea or burning micturition.  Seen in post cath.  12 point ROS negative other than mentioned above       Medications:  Reviewed    Infusion Medications    sodium chloride      sodium chloride      sodium chloride 75 mL/hr at 07/14/25 1351    sodium chloride 50 mL/hr at 07/13/25 1838    amiodarone 0.5 mg/min (07/14/25 1340)    sodium chloride       Scheduled Medications    sodium chloride flush  5-40 mL IntraVENous 2 times per day    sodium chloride flush  5-40 mL IntraVENous 2 times per day    enoxaparin  1 mg/kg SubCUTAneous BID    [START ON 7/15/2025] aspirin  81 mg Oral Daily    [START ON 7/15/2025] clopidogrel  75 mg Oral Daily    metoprolol succinate  50 mg Oral Daily    pantoprazole  40 mg Oral QAM AC    sodium chloride flush  5-40 mL IntraVENous 2 times per day     PRN Meds: sodium chloride flush, sodium chloride, sodium chloride flush, sodium chloride, cyclobenzaprine, traMADol, sodium chloride flush, sodium chloride, potassium chloride **OR** potassium alternative oral replacement **OR** potassium chloride, magnesium sulfate, ondansetron **OR** ondansetron, polyethylene glycol, acetaminophen **OR** acetaminophen      Intake/Output Summary (Last 24 hours) at 7/14/2025 1359  Last data filed at 7/13/2025 2039  Gross per 24 hour   Intake 120 ml   Output --   Net 120 ml       Exam:    BP (!) 125/90   Pulse 59   Temp 98.2 °F (36.8 °C) (Oral)   Resp 18   Ht 1.829 m (6' 0.01\")   Wt (!) 149 kg (328 lb 7.8 oz)   SpO2 92%   BMI 44.54 kg/m²     General appearance: No apparent distress, appears stated age and cooperative.  HEENT:  Conjunctivae/corneas clear.  Neck: Trachea

## 2025-07-14 NOTE — PROGRESS NOTES
Right radial clean dry and intact. Old blood noted on skin of bandage. Patient with no complaints

## 2025-07-14 NOTE — INTERVAL H&P NOTE
Update History & Physical    The patient's History and Physical of July 12, 25 was reviewed with the patient and I examined the patient. There was no change. The surgical site was confirmed by the patient and me.     Plan: The risks, benefits, expected outcome, and alternative to the recommended procedure have been discussed with the patient. Patient understands and wants to proceed with the procedure.     Electronically signed by Lucio Puri DO on 7/14/2025 at 7:45 AM

## 2025-07-14 NOTE — PROGRESS NOTES
Received telephone report from Nishi BARRIENTOS. Patient transported to pre/post via wheel chair. Fluids and amiodarone gtt running as ordered. Pt alert and oriented. denies chestpain, shortness of breath.  Consents signed and reviewed. Gina BARRIENTOS from specials at bedside to start ultrasound guided IV. Pt afib RVR on the monitor. Asymptomatic. Pt denies any needs at this time.

## 2025-07-14 NOTE — CONSULTS
Cardiac Rehab Consult Note  Name: Esther Dodson  Age: 75 y.o.  Gender: male    Chief Complaint:Shortness of Breath, Irregular Heart Beat (New onset A-fib), Chest Pain (Mid sternal 1.5 weeks ), and Cough    Primary Care Provider: Mati Gonzalez MD  InpatientTreatment Team: Treatment Team:   Johnathan Lagos MD Marouf, Loai F, DO Paugh, Kailey, Waleska Salinas RN Wasily, Shannon, Lucio Roman DO Cason, Deanna L, RN Vance, Kelly, RN Hernandez, Kennedy  Admission Date: 7/11/2025    Consult Received from Dr. Puri.  Reason for consult PCI.  Patient visited at bedside.  Program and benefits introduced.  Brochures given. Patient is interested.     Principal Problem:    New onset a-fib (HCC)  Active Problems:    DCM (dilated cardiomyopathy) (HCC)  Resolved Problems:    * No resolved hospital problems. *       Plan: f/u as outpatient     All of pt questions were answered.Case will be discussed with physician when appropriate.     Electronically signed by Demetrio Jacome on 7/14/2025 at 3:57 PM

## 2025-07-14 NOTE — PROGRESS NOTES
1540- vasc band removed from right wrist. Remains stable. Telephone report called to Evelin BARRIENTOS. Patient denies any further needs. Patient to transport back to [facility] on tele. Vitals stable.

## 2025-07-14 NOTE — PROGRESS NOTES
Patient back from procedure, received hand off report from Darlin BARRIENTOS. Patient denies chestpain, shortness of breath. Post procedure EKG completed. Sinus michael on monitor. Right wrist stable with vasc band in place. No bleeding, hematoma, swelling noted. Vitals stable.

## 2025-07-14 NOTE — PROGRESS NOTES
Cardiology Follow up Note    Subjective:  No CP or SOB. No palpitations despite Afib with RVR on telemetry. HR still 130s despite IV amiodarone. Awaiting cardiac cath today.       Review of Systems:   As noted in the HPI*    Objective:  BP (!) 135/91   Pulse 54   Temp 98.2 °F (36.8 °C) (Oral)   Resp 25   Ht 1.829 m (6' 0.01\")   Wt (!) 149 kg (328 lb 7.8 oz)   SpO2 96%   BMI 44.54 kg/m²   Vitals stable.   Constitutional: alert, cooperative, in no distress  Eyes: Conjunctiva clear; no scleral icturus. PERRLA   Respiratory: clear to auscultation bilaterally  Musculoskeletal: No chest wall tenderness. No clubbing or cyanosis.   Cardiovascular: irregularly irregular. Tachy No carotid bruit. No JVD. Pulses 2+ and symmetric. 2+ bilateral leg edema chronic venous changes.   GI: soft, non-tender, non-distended. Bowel sounds normal. No masses,  no organomegaly  MSK: extremities normal, atraumatic, no clubbing. No chest wall pain.    Neurologic: Cranial nerves grossly intact.  No focal motor and/or sensory deficits.  Skin: No rashes or lesions. No cyanosis.       Intake/Output Summary (Last 24 hours) at 7/14/2025 1656  Last data filed at 7/13/2025 2039  Gross per 24 hour   Intake 120 ml   Output --   Net 120 ml       Medications:  enoxaparin, 1 mg/kg, BID  [START ON 7/15/2025] aspirin, 81 mg, Daily  [START ON 7/15/2025] clopidogrel, 75 mg, Daily  metoprolol succinate, 50 mg, Daily  pantoprazole, 40 mg, QAM AC  sodium chloride flush, 5-40 mL, 2 times per day      sodium chloride, Last Rate: 75 mL/hr at 07/14/25 1351  sodium chloride, Last Rate: 50 mL/hr at 07/13/25 1838  amiodarone, Last Rate: 0.5 mg/min (07/14/25 1340)  sodium chloride      Recent Labs     07/12/25  0951 07/13/25  0523 07/14/25  0531   HGB 14.0  --  14.5   WBC 10.2  --  11.1*     --  214   * 139 140   K 3.6 3.5 3.3*   CO2 22 22 20   BUN 22 23 23   MG  --  2.0 2.0     Cr 1.66    Telemetry: Afib with RVR on telemetry. -130s but spike up

## 2025-07-14 NOTE — PROGRESS NOTES
Sedation Pre- Procedure Evaluation    Patient name: Esther Dodson  YOB: 1950  MRN: [id number]   Allergies:   Patient has no known allergies.        Type Of Sedation  [x]local anesthesia  [x]moderate (conscious sedation)  [x]deep/analgesia      RN Focused History    Yes        No  N/A  []   [x]  Previous problem with airway anesthesia, sedation, or family history of the same    [x]   []  History Of tobacco, alcohol, or substance abuse     [x]   []  Problems associated with stridor, snoring, or sleep apnea    []   [] [x] Pediatric patient, history of premature birth or ventilator support (Moderate Sedation Only)     [x]   [] [] Moderate Sedation: Clear liquids within 6 hours of sedation and NPO within 2 hours    [x]   [] [] Deep Sedation:Clear liquids within 6 hours of sedation and NPO within 2 hours      NPO since: lastnight     Vitals, Cardiac Rhythm, Pain:   Vitals  Temp: 98.2 °F (36.8 °C)  Temp Source: Oral  Pulse: (!) 133  Heart Rate Source: Monitor  Respirations: 25  BP: (!) 123/97  MAP (Calculated): 106  MAP (mmHg): 107  BP Location: Left upper arm  BP Upper/Lower: Upper  BP Method: Automatic  Patient Position: Sitting  Cardiac Rhythm: A fib RVR  Pain Assessment  Pain Assessment: None - Denies Pain  Pain Level: 3  Pain Location: Abdomen  Pain Orientation: Mid        Michael Scale: Activity: Able to move four extremities voluntarily or on command  Respiration: Able to breathe and cough freely  Circulation: Blood pressure within 20% of preanesthesia level  Consciousness: Fully awake  Oxygen: SAO2 > 92% on room air   Michael Score: 10     Activity:  2 - Able to move 4 extremities voluntarily on command  Respiration:  2 - Able to breathe deeply and cough freely  Circulation:  2 - BP+/- 20mmHg of normal  Consciousness:  2 - Fully awake  Oxygen Saturation (color):  2 - Able to maintain oxygen saturation >92% on room air      Prior to Admission medications    Medication Sig Start Date End Date Taking?

## 2025-07-15 ENCOUNTER — TELEPHONE (OUTPATIENT)
Age: 75
End: 2025-07-15

## 2025-07-15 VITALS
OXYGEN SATURATION: 95 % | DIASTOLIC BLOOD PRESSURE: 74 MMHG | BODY MASS INDEX: 42.66 KG/M2 | WEIGHT: 315 LBS | RESPIRATION RATE: 18 BRPM | HEART RATE: 56 BPM | TEMPERATURE: 97.7 F | HEIGHT: 72 IN | SYSTOLIC BLOOD PRESSURE: 119 MMHG

## 2025-07-15 LAB
ANION GAP SERPL CALCULATED.3IONS-SCNC: 13 MEQ/L (ref 9–15)
BUN SERPL-MCNC: 20 MG/DL (ref 8–23)
CALCIUM SERPL-MCNC: 8.5 MG/DL (ref 8.5–9.9)
CHLORIDE SERPL-SCNC: 105 MEQ/L (ref 95–107)
CO2 SERPL-SCNC: 19 MEQ/L (ref 20–31)
CREAT SERPL-MCNC: 1.5 MG/DL (ref 0.7–1.2)
GLUCOSE SERPL-MCNC: 99 MG/DL (ref 70–99)
POTASSIUM SERPL-SCNC: 3.6 MEQ/L (ref 3.4–4.9)
SODIUM SERPL-SCNC: 137 MEQ/L (ref 135–144)

## 2025-07-15 PROCEDURE — 36415 COLL VENOUS BLD VENIPUNCTURE: CPT

## 2025-07-15 PROCEDURE — 6370000000 HC RX 637 (ALT 250 FOR IP): Performed by: INTERNAL MEDICINE

## 2025-07-15 PROCEDURE — 2580000003 HC RX 258: Performed by: INTERNAL MEDICINE

## 2025-07-15 PROCEDURE — 80048 BASIC METABOLIC PNL TOTAL CA: CPT

## 2025-07-15 PROCEDURE — 6360000002 HC RX W HCPCS: Performed by: INTERNAL MEDICINE

## 2025-07-15 PROCEDURE — 93005 ELECTROCARDIOGRAM TRACING: CPT | Performed by: INTERNAL MEDICINE

## 2025-07-15 PROCEDURE — 6370000000 HC RX 637 (ALT 250 FOR IP): Performed by: STUDENT IN AN ORGANIZED HEALTH CARE EDUCATION/TRAINING PROGRAM

## 2025-07-15 PROCEDURE — 6370000000 HC RX 637 (ALT 250 FOR IP)

## 2025-07-15 PROCEDURE — APPSS45 APP SPLIT SHARED TIME 31-45 MINUTES

## 2025-07-15 PROCEDURE — 2500000003 HC RX 250 WO HCPCS: Performed by: STUDENT IN AN ORGANIZED HEALTH CARE EDUCATION/TRAINING PROGRAM

## 2025-07-15 RX ORDER — ATORVASTATIN CALCIUM 40 MG/1
40 TABLET, FILM COATED ORAL NIGHTLY
Status: DISCONTINUED | OUTPATIENT
Start: 2025-07-15 | End: 2025-07-15 | Stop reason: HOSPADM

## 2025-07-15 RX ORDER — AMIODARONE HYDROCHLORIDE 200 MG/1
200 TABLET ORAL 2 TIMES DAILY
Status: DISCONTINUED | OUTPATIENT
Start: 2025-07-15 | End: 2025-07-15 | Stop reason: HOSPADM

## 2025-07-15 RX ORDER — METOPROLOL SUCCINATE 50 MG/1
50 TABLET, EXTENDED RELEASE ORAL DAILY
Qty: 30 TABLET | Refills: 3 | Status: SHIPPED | OUTPATIENT
Start: 2025-07-16 | End: 2025-07-23 | Stop reason: SDUPTHER

## 2025-07-15 RX ORDER — CLOPIDOGREL BISULFATE 75 MG/1
75 TABLET ORAL DAILY
Qty: 30 TABLET | Refills: 3 | Status: SHIPPED | OUTPATIENT
Start: 2025-07-16 | End: 2025-07-23 | Stop reason: SDUPTHER

## 2025-07-15 RX ORDER — AMIODARONE HYDROCHLORIDE 200 MG/1
200 TABLET ORAL 2 TIMES DAILY
Qty: 60 TABLET | Refills: 1 | Status: SHIPPED | OUTPATIENT
Start: 2025-07-15 | End: 2025-07-23 | Stop reason: SDUPTHER

## 2025-07-15 RX ORDER — ATORVASTATIN CALCIUM 40 MG/1
40 TABLET, FILM COATED ORAL NIGHTLY
Qty: 30 TABLET | Refills: 3 | Status: SHIPPED | OUTPATIENT
Start: 2025-07-15 | End: 2025-07-23 | Stop reason: SDUPTHER

## 2025-07-15 RX ORDER — ASPIRIN 81 MG/1
81 TABLET ORAL DAILY
Qty: 30 TABLET | Refills: 0 | Status: SHIPPED | OUTPATIENT
Start: 2025-07-16

## 2025-07-15 RX ORDER — ASPIRIN 81 MG/1
81 TABLET ORAL DAILY
Qty: 30 TABLET | Refills: 3 | Status: SHIPPED | OUTPATIENT
Start: 2025-07-16 | End: 2025-07-15

## 2025-07-15 RX ADMIN — AMIODARONE HYDROCHLORIDE 200 MG: 200 TABLET ORAL at 12:55

## 2025-07-15 RX ADMIN — PANTOPRAZOLE SODIUM 40 MG: 40 TABLET, DELAYED RELEASE ORAL at 06:50

## 2025-07-15 RX ADMIN — ASPIRIN 81 MG: 81 TABLET, COATED ORAL at 08:18

## 2025-07-15 RX ADMIN — CLOPIDOGREL BISULFATE 75 MG: 75 TABLET, FILM COATED ORAL at 08:18

## 2025-07-15 RX ADMIN — ENOXAPARIN SODIUM 150 MG: 150 INJECTION SUBCUTANEOUS at 08:18

## 2025-07-15 RX ADMIN — SODIUM CHLORIDE, PRESERVATIVE FREE 10 ML: 5 INJECTION INTRAVENOUS at 08:19

## 2025-07-15 RX ADMIN — METOPROLOL SUCCINATE 50 MG: 50 TABLET, EXTENDED RELEASE ORAL at 08:18

## 2025-07-15 RX ADMIN — AMIODARONE HYDROCHLORIDE 0.5 MG/MIN: 50 INJECTION, SOLUTION INTRAVENOUS at 11:11

## 2025-07-15 NOTE — TELEPHONE ENCOUNTER
----- Message from Nadeem Rooney PA-C sent at 7/15/2025 12:01 PM EDT -----  Regarding: Appointment  Please schedule patient with Dr. Christopher in 1-2 weeks for hospital follow.   Preferred days: Monday or Friday in the afternoon.   Thank you

## 2025-07-15 NOTE — FLOWSHEET NOTE
Nurse reviewed discharge paperwork with patient. Medications delivered to patient bedside. Patient states they have no further questions regarding discharge plan. IV and heart monitor removed. Transport called.    .Electronically signed by Janki Dennison RN on 7/15/2025 at 4:54 PM

## 2025-07-15 NOTE — DISCHARGE SUMMARY
Hospital Medicine Discharge Summary    Esther Dodson  :  1950  MRN:  [id number]    Admit date:  2025  Discharge date:  7/15/2025    Admitting Physician:  Angella Horan MD  Primary Care Physician:  Mati Gonzalez MD      Discharge Diagnoses:    ***    Chief Complaint   Patient presents with    Shortness of Breath    Irregular Heart Beat     New onset A-fib    Chest Pain     Mid sternal 1.5 weeks     Cough     Hospital Course:         ***    Exam on discharge: ***  /74   Pulse 56   Temp 97.7 °F (36.5 °C) (Oral)   Resp 18   Ht 1.829 m (6' 0.01\")   Wt (!) 149 kg (328 lb 7.8 oz)   SpO2 95%   BMI 44.54 kg/m²   General appearance: No apparent distress, appears stated age and cooperative.  HEENT: Pupils equal, round, and reactive to light. Conjunctivae/corneas clear.  Neck: Supple, with full range of motion. No jugular venous distention. Trachea midline.  Respiratory:  Normal respiratory effort. Clear to auscultation, bilaterally without Rales/Wheezes/Rhonchi.  Cardiovascular: Regular rate and rhythm with normal S1/S2 without murmurs, rubs or gallops.  Abdomen: Soft, non-tender, non-distended with normal bowel sounds.  Musculoskeletal: No clubbing, cyanosis or edema bilaterally.  Full range of motion without deformity.  Skin: Skin color, texture, turgor normal.  No rashes or lesions.  Neuro: Non Focal. Symetrical motor and tone. Nl Comprehension, Alert,awake and oriented. NL CN. Symetrical tone and reflexes.  Psychiatric: Alert and oriented, thought content appropriate, normal insight  Capillary Refill: Brisk,< 3 seconds   Peripheral Pulses: +2 palpable, equal bilaterally     Patient was seen by the following consultants   Consults:  IP CONSULT TO CARDIOLOGY  IP CONSULT TO CARDIAC REHAB    Significant Diagnostic Studies:    Refer to chart     Please refer to chart if no studies are shown here    Echo (TTE) complete (PRN contrast/bubble/strain/3D)  Result Date: 2025    Left Ventricle:

## 2025-07-15 NOTE — DISCHARGE INSTR - DIET
Good nutrition is important when healing from an illness, injury, or surgery.  Follow any nutrition recommendations given to you during your hospital stay.   If you were given an oral nutrition supplement while in the hospital, continue to take this supplement at home.  You can take it with meals, in-between meals, and/or before bedtime. These supplements can be purchased at most local grocery stores, pharmacies, and chain [employer]-stores.   If you have any questions about your diet or nutrition, call the hospital and ask for the dietitian.  Primary Diet Regular   Cardiac Restriction Low Fat/Low Chol/High Fiber/LEENA

## 2025-07-15 NOTE — PROGRESS NOTES
07/14/2025 05:31 AM     07/14/2025 05:31 AM    MPV 9.8 07/02/2014 09:08 AM     CBC with Differential:   Lab Results   Component Value Date/Time    WBC 11.1 07/14/2025 05:31 AM    RBC 4.94 07/14/2025 05:31 AM    HGB 14.5 07/14/2025 05:31 AM    HCT 44.4 07/14/2025 05:31 AM     07/14/2025 05:31 AM    MCV 89.9 07/14/2025 05:31 AM    MCH 29.4 07/14/2025 05:31 AM    MCHC 32.7 07/14/2025 05:31 AM    RDW 13.4 07/14/2025 05:31 AM    LYMPHOPCT 6.6 07/12/2025 09:51 AM    MONOPCT 7.6 07/12/2025 09:51 AM    EOSPCT 0.6 07/12/2025 09:51 AM    BASOPCT 0.4 07/12/2025 09:51 AM    MONOSABS 0.8 07/12/2025 09:51 AM    LYMPHSABS 0.7 07/12/2025 09:51 AM    EOSABS 0.1 07/12/2025 09:51 AM    BASOSABS 0.0 07/12/2025 09:51 AM     CMP:    Lab Results   Component Value Date/Time     07/15/2025 05:50 AM    K 3.6 07/15/2025 05:50 AM     07/15/2025 05:50 AM    CO2 19 07/15/2025 05:50 AM    BUN 20 07/15/2025 05:50 AM    CREATININE 1.50 07/15/2025 05:50 AM    GFRAA >60.0 08/11/2022 05:18 AM    LABGLOM 48.7 07/15/2025 05:50 AM    GLUCOSE 99 07/15/2025 05:50 AM    GLUCOSE 113 10/26/2022 05:27 AM    CALCIUM 8.5 07/15/2025 05:50 AM    BILITOT 0.5 07/11/2025 11:58 AM    ALKPHOS 40 07/11/2025 11:58 AM    AST 31 07/11/2025 11:58 AM    ALT 24 07/11/2025 11:58 AM     BMP:    Lab Results   Component Value Date/Time     07/15/2025 05:50 AM    K 3.6 07/15/2025 05:50 AM     07/15/2025 05:50 AM    CO2 19 07/15/2025 05:50 AM    BUN 20 07/15/2025 05:50 AM    CREATININE 1.50 07/15/2025 05:50 AM    CALCIUM 8.5 07/15/2025 05:50 AM    GFRAA >60.0 08/11/2022 05:18 AM    LABGLOM 48.7 07/15/2025 05:50 AM    GLUCOSE 99 07/15/2025 05:50 AM    GLUCOSE 113 10/26/2022 05:27 AM     Magnesium:    Lab Results   Component Value Date/Time    MG 2.0 07/14/2025 05:31 AM     Troponin:    Lab Results   Component Value Date/Time    TROPONINI <0.010 08/09/2022 01:42 AM       Radiology:  No results found.     Assessment:       Persistent atrial  fibrillation with RVR status post successful AMAIRANI guided cardioversion into normal sinus rhythm on 7/14/2025  [id number] score: At least 3--indicating high risk for stroke  CAD status post PCI/WARREN x 1 to mid LAD on 7/14/2025  Ischemic cardiomyopathy, EF 35 to 40% by echocardiogram 7/12/2025  HFrEF without overt decompensation  1st degree AV Block   Hypertension  DAVID noncompliant with CPAP  Severe pulmonary hypertension  Obesity  CAROL--improving     Plan:  Continue aspirin and Plavix  Add high intensity statin  Continue Toprol-XL 50 mg p.o. daily  Start amiodarone 200 mg po BID. DC drip   Consider starting Entresto once kidney function improves   Continue full dose Lovenox for now.  Recommend oral anticoagulation such as Eliquis prior to discharge.  Continue to monitor on telemetry for any arrhythmias  Continue to monitor electrolytes and hemoglobin  Patient prefers to follow-up with Dr. Christopher in clinic.  Hospital follow-up in 1 to 2 weeks.  Further recommendations per Dr. Christopher    Electronically signed by Nadeem Rooney PA-C on 7/15/2025 at 12:01 PM

## 2025-07-15 NOTE — PROGRESS NOTES
CLINICAL PHARMACY NOTE: MEDS TO BEDS    Total # of Prescriptions Filled: 2   The following medications were delivered to the patient:  Clopidogrel 75 mg Tab  Aspirin 81 mg Tab    Additional Documentation:

## 2025-07-15 NOTE — FLOWSHEET NOTE
Verified patient name, date of birth, with the patient ID band with the patient and the monitor room tech on the phone. Verified rythm and rate with monitor tech.  Telebox #:[id number]  Monitor Tech Name:  Electronically signed by Janki Dennison RN on 7/15/2025 at 8:23 AM

## 2025-07-16 ENCOUNTER — TELEPHONE (OUTPATIENT)
Dept: PRIMARY CARE CLINIC | Age: 75
End: 2025-07-16

## 2025-07-16 ENCOUNTER — CARE COORDINATION (OUTPATIENT)
Dept: CARE COORDINATION | Age: 75
End: 2025-07-16

## 2025-07-16 LAB
EKG ATRIAL RATE: 55 BPM
EKG DIAGNOSIS: NORMAL
EKG P AXIS: 35 DEGREES
EKG P-R INTERVAL: 252 MS
EKG Q-T INTERVAL: 464 MS
EKG QRS DURATION: 138 MS
EKG QTC CALCULATION (BAZETT): 443 MS
EKG R AXIS: -37 DEGREES
EKG T AXIS: 17 DEGREES
EKG VENTRICULAR RATE: 55 BPM

## 2025-07-16 NOTE — TELEPHONE ENCOUNTER
Care Transitions Initial Follow Up Call    Outreach made within 2 business days of discharge: Yes    Patient: Esther Dodson Patient : 1950   MRN: [id number]  Reason for Admission: New onset a-fib (HCC)  Principal problem  Discharge Date: 7/15/25       Spoke with: MEHDI    Discharge department/facility: [facility]    Scheduled appointment with PCP within 7-14 days    Follow Up  Future Appointments   Date Time Provider Department Center   2025 10:30 AM Mati Gonzalez MD [place] [facility]   2025 11:40 AM Godwin Christopher DO [facility]   2025  8:30 AM Raad Norton MD [facility] [place]

## 2025-07-16 NOTE — TELEPHONE ENCOUNTER
Care Transitions Initial Follow Up Call    Outreach made within 2 business days of discharge: Yes    Patient: Esther Dodson Patient : 1950   MRN: [id number]  Reason for Admission:   New onset a-fib (HCC)  Principal problem       Discharge Date: 7/15/25       Spoke with: PT    Discharge department/facility: [facility]    TCM Interactive Patient Contact:  Was patient able to fill all prescriptions: Yes  Was patient instructed to bring all medications to the follow-up visit: Yes  Is patient taking all medications as directed in the discharge summary? Yes  Does patient understand their discharge instructions: Yes  Does patient have questions or concerns that need addressed prior to 7-14 day follow up office visit: no    Additional needs identified to be addressed with provider  No needs identified             Scheduled appointment with PCP within 7-14 days    Follow Up  Future Appointments   Date Time Provider Department Center   2025 10:30 AM Mati Gonzalez MD [place] [facility]   2025 11:40 AM Godwin Christopher DO [facility] [facility]   2025  8:30 AM Raad Norton MD [facility] [place]

## 2025-07-16 NOTE — CARE COORDINATION
Care Transitions Note    Initial Call - Call within 2 business days of discharge: Yes    Patient Current Location:  Home: [address] [place] [id number]    Care Transition Nurse contacted the patient by telephone to perform post hospital discharge assessment, verified name and  as identifiers.  Provided introduction to self, and explanation of the Care Transition Nurse role.    Patient: Esther Dodson    Patient : 1950   MRN: [id number]    Reason for Admission: New onset A fib  Discharge Date: 7/15/25  RURS: Readmission Risk Score: 11.2      Last Discharge Facility       Date Complaint Diagnosis Description Type Department Provider    25 Shortness of Breath; Irregular Heart Beat; Chest Pain; Cough New onset a-fib (HCC) ... ED to Hosp-Admission (Discharged) (ADMITTED) Johnathan Martínez MD; Ally Mackenzie...            Was this an external facility discharge? No    Additional needs identified to be addressed with provider   No needs identified             Method of communication with provider: none.    Patients top risk factors for readmission: medical condition-A fib, pleural effusion, sleep apnea    Interventions to address risk factors:   Review of patient management of conditions/medications: Reviewed medications, symptoms, appts, blood thinner precautions    Care Summary Note: Patient was sent to ED from PCP office for new onset A fib. Cardiac cath, no stents placed. Cardioverted back to NSR IP. Sent home on Plavix, ASA, Eliquis, Amiodarone, Lipitor, Metoprolol. Pt aware of d/c'd medications. Right wrist insertion site wnl. Stated he had excellent care IP. Denies increased SOB, CP. Has pule ox, no BP cuff. Pt stopped Ozempic prior to admission d/t adverse side effects. Pt to complete sleep study for sleep apnea, may need CPAP.     Pt has PCP and Cardiology appts scheduled, had excellent care IP. Agreeable to transitions calls.     Care Transition Nurse reviewed discharge

## 2025-07-21 LAB
ECHO AO ROOT DIAM: 3.8 CM
ECHO AO ROOT INDEX: 1.44 CM/M2
ECHO AV AREA PLAN/BSA: 1.37 CM2/M2
ECHO AV AREA PLAN: 3.6 CM2
ECHO BSA: 2.77 M2
ECHO BSA: 2.77 M2
ECHO LVOT AREA: 3.1 CM2
ECHO LVOT DIAM: 2 CM

## 2025-07-21 PROCEDURE — 93312 ECHO TRANSESOPHAGEAL: CPT | Performed by: INTERNAL MEDICINE

## 2025-07-21 PROCEDURE — 93325 DOPPLER ECHO COLOR FLOW MAPG: CPT | Performed by: INTERNAL MEDICINE

## 2025-07-21 PROCEDURE — 93320 DOPPLER ECHO COMPLETE: CPT | Performed by: INTERNAL MEDICINE

## 2025-07-22 ENCOUNTER — OFFICE VISIT (OUTPATIENT)
Dept: PRIMARY CARE CLINIC | Age: 75
End: 2025-07-22

## 2025-07-22 VITALS
BODY MASS INDEX: 42.66 KG/M2 | HEART RATE: 60 BPM | WEIGHT: 315 LBS | HEIGHT: 72 IN | DIASTOLIC BLOOD PRESSURE: 82 MMHG | SYSTOLIC BLOOD PRESSURE: 152 MMHG | OXYGEN SATURATION: 97 %

## 2025-07-22 DIAGNOSIS — M54.89 OTHER BACK PAIN, UNSPECIFIED CHRONICITY: ICD-10-CM

## 2025-07-22 DIAGNOSIS — Z09 HOSPITAL DISCHARGE FOLLOW-UP: ICD-10-CM

## 2025-07-22 DIAGNOSIS — I25.10 CORONARY ARTERY DISEASE INVOLVING NATIVE CORONARY ARTERY OF NATIVE HEART WITHOUT ANGINA PECTORIS: Primary | ICD-10-CM

## 2025-07-22 RX ORDER — TRAMADOL HYDROCHLORIDE 50 MG/1
50 TABLET ORAL EVERY 6 HOURS PRN
Qty: 120 TABLET | Refills: 2 | Status: SHIPPED | OUTPATIENT
Start: 2025-07-22 | End: 2025-10-20

## 2025-07-22 NOTE — PROGRESS NOTES
Esther TEJADA Ivory 75 y.o. male presents today with   Chief Complaint   Patient presents with    Follow-Up from Hospital     [facility]  7/11        HPI10 days ago. Stent placed    Past Medical History:   Diagnosis Date    Coronary artery disease involving native coronary artery of native heart without angina pectoris 08/23/2022    Degenerative arthritis of knee, bilateral     GERD (gastroesophageal reflux disease)     Hip arthritis     bilateral    Hyperlipidemia     Hypertension     Morbid obesity (Formerly [facility]) 08/23/2022    Osteoarthritis of right thumb     Sleep apnea, obstructive 2022    cpap 9 cm     Patient Active Problem List    Diagnosis Date Noted    Chronic systolic (congestive) heart failure 01/09/2023    DAVID (obstructive sleep apnea) 10/10/2022    Morbid obesity (Former[facility]) 08/23/2022    Coronary artery disease involving native coronary artery of native heart without angina pectoris 08/23/2022    NSVT (nonsustained ventricular tachycardia) ([facility]ly [facility]) 08/16/2022    Ventricular bigeminy 08/08/2022    Chronic renal disease, stage III (Formerly [facility]) [919181] 06/27/2022    DCM (dilated cardiomyopathy) (Formerly [facility]) 07/13/2025    New onset a-fib (Formerly [facility]) 07/11/2025    Pain in left knee 03/11/2019    Unilateral primary osteoarthritis, left knee 03/11/2019    Unilateral primary osteoarthritis, left knee 03/11/2019    Acute bronchitis 01/17/2017     Past Surgical History:   Procedure Laterality Date    CARPAL TUNNEL RELEASE      COLONOSCOPY      KNEE ARTHROPLASTY Left     VASECTOMY       Family History   Problem Relation Age of Onset    Cancer Mother         uterine, breast, skin    Stroke Brother      Social History     Socioeconomic History    Marital status:      Spouse name: None    Number of children: None    Years of education: None    Highest education level: None   Tobacco Use    Smoking status: Never    Smokeless tobacco: Never    Tobacco comments:     cigars, weekly   Vaping Use    Vaping status: Never Used   Substance and Sexual Activity

## 2025-07-23 ENCOUNTER — OFFICE VISIT (OUTPATIENT)
Age: 75
End: 2025-07-23
Payer: MEDICARE

## 2025-07-23 VITALS
HEART RATE: 56 BPM | BODY MASS INDEX: 45.33 KG/M2 | WEIGHT: 315 LBS | SYSTOLIC BLOOD PRESSURE: 162 MMHG | OXYGEN SATURATION: 94 % | DIASTOLIC BLOOD PRESSURE: 94 MMHG

## 2025-07-23 DIAGNOSIS — N17.9 AKI (ACUTE KIDNEY INJURY): ICD-10-CM

## 2025-07-23 DIAGNOSIS — I25.10 CORONARY ARTERY DISEASE INVOLVING NATIVE CORONARY ARTERY OF NATIVE HEART WITHOUT ANGINA PECTORIS: Primary | ICD-10-CM

## 2025-07-23 DIAGNOSIS — I48.91 NEW ONSET A-FIB (HCC): ICD-10-CM

## 2025-07-23 DIAGNOSIS — I47.29 NSVT (NONSUSTAINED VENTRICULAR TACHYCARDIA) (HCC): ICD-10-CM

## 2025-07-23 DIAGNOSIS — I50.22 CHRONIC SYSTOLIC (CONGESTIVE) HEART FAILURE (HCC): ICD-10-CM

## 2025-07-23 DIAGNOSIS — G47.33 OSA (OBSTRUCTIVE SLEEP APNEA): ICD-10-CM

## 2025-07-23 LAB
ANION GAP SERPL CALCULATED.3IONS-SCNC: 13 MEQ/L (ref 9–15)
BUN SERPL-MCNC: 14 MG/DL (ref 8–23)
CALCIUM SERPL-MCNC: 8.8 MG/DL (ref 8.5–9.9)
CHLORIDE SERPL-SCNC: 105 MEQ/L (ref 95–107)
CO2 SERPL-SCNC: 22 MEQ/L (ref 20–31)
CREAT SERPL-MCNC: 1.18 MG/DL (ref 0.7–1.2)
GLUCOSE SERPL-MCNC: 82 MG/DL (ref 70–99)
POTASSIUM SERPL-SCNC: 4.1 MEQ/L (ref 3.4–4.9)
SODIUM SERPL-SCNC: 140 MEQ/L (ref 135–144)

## 2025-07-23 PROCEDURE — 1126F AMNT PAIN NOTED NONE PRSNT: CPT | Performed by: INTERNAL MEDICINE

## 2025-07-23 PROCEDURE — 1123F ACP DISCUSS/DSCN MKR DOCD: CPT | Performed by: INTERNAL MEDICINE

## 2025-07-23 PROCEDURE — 93000 ELECTROCARDIOGRAM COMPLETE: CPT | Performed by: INTERNAL MEDICINE

## 2025-07-23 PROCEDURE — 99214 OFFICE O/P EST MOD 30 MIN: CPT | Performed by: INTERNAL MEDICINE

## 2025-07-23 RX ORDER — ATORVASTATIN CALCIUM 40 MG/1
40 TABLET, FILM COATED ORAL NIGHTLY
Qty: 90 TABLET | Refills: 3 | Status: SHIPPED | OUTPATIENT
Start: 2025-07-23

## 2025-07-23 RX ORDER — CLOPIDOGREL BISULFATE 75 MG/1
75 TABLET ORAL DAILY
Qty: 90 TABLET | Refills: 3 | Status: SHIPPED | OUTPATIENT
Start: 2025-07-23

## 2025-07-23 RX ORDER — METOPROLOL SUCCINATE 50 MG/1
50 TABLET, EXTENDED RELEASE ORAL DAILY
Qty: 90 TABLET | Refills: 3 | Status: SHIPPED | OUTPATIENT
Start: 2025-07-23

## 2025-07-23 RX ORDER — AMIODARONE HYDROCHLORIDE 200 MG/1
200 TABLET ORAL 2 TIMES DAILY
Qty: 180 TABLET | Refills: 3 | Status: SHIPPED | OUTPATIENT
Start: 2025-07-23

## 2025-07-24 ENCOUNTER — CARE COORDINATION (OUTPATIENT)
Dept: CARE COORDINATION | Age: 75
End: 2025-07-24

## 2025-07-24 NOTE — CARE COORDINATION
Care Transitions Note    Follow Up Call     Attempted to reach patient for transitions of care follow up.  Unable to reach patient.      Outreach Attempts:   HIPAA compliant voicemail left for patient.     Care Summary Note: Attempted to contact patient today 7/24/25 for CARRI/hospital discharge follow up sub call for new onset of Afib. Left messages on both home and mobile numbers requesting a return call back to CTN and provided contact information.     Noted patient completed HFU appts with Dr. Gonzalez (PCP) on 7/22/25 and with Dr. Christopher (cardiology) yesterday 7/23/25. [facility]N will continue to follow for Care Transition.     Follow Up Appointment:   Future Appointments         Provider Specialty Dept Phone    8/5/2025  9:00 AM SCHEDULE, [facility] Cardiac Rehabilitation [phone]    9/18/2025 8:30 AM Raad Norton MD Bariatrics [phone]    10/23/2025 11:00 AM Godwin Christopher,  Cardiology [phone]            Plan for follow-up call in 6-10 days based on severity of symptoms and risk factors. Plan for next call: Symptom check.Did patient get scheduled for sleep study (DAVID)?    Vanessa Vaughan, MARCELLA

## 2025-07-31 ENCOUNTER — CARE COORDINATION (OUTPATIENT)
Dept: CARE COORDINATION | Age: 75
End: 2025-07-31

## 2025-07-31 NOTE — CARE COORDINATION
Care Transitions Note    Follow Up Call     Attempted to reach patient for transitions of care follow up.  Unable to reach patient.      Outreach Attempts:   Multiple attempts to contact patient at phone numbers on file.   HIPAA compliant voicemail left for patient.     Care Summary Note: 2nd attempt-will end care transitions if no return call received.     Follow Up Appointment:   Future Appointments         Provider Specialty Dept Phone    8/5/2025  9:00 AM SCHEDULE, [facility] Cardiac Rehabilitation [phone]    9/18/2025 8:30 AM Raad Norton MD Bariatrics [phone]    10/23/2025 11:00 AM Godwin Christopher, DO Cardiology [phone]            No further follow-up call indicated based on severity of symptoms and risk factors.    Krystal Bonner LPN

## 2025-08-04 DIAGNOSIS — Z95.5 STENTED CORONARY ARTERY: Primary | ICD-10-CM

## 2025-08-05 ENCOUNTER — TELEPHONE (OUTPATIENT)
Age: 75
End: 2025-08-05

## 2025-08-05 ENCOUNTER — HOSPITAL ENCOUNTER (OUTPATIENT)
Dept: CARDIAC REHAB | Age: 75
Setting detail: THERAPIES SERIES
Discharge: HOME | End: 2025-08-05
Payer: MEDICARE

## 2025-08-05 VITALS — WEIGHT: 315 LBS | HEIGHT: 72 IN | RESPIRATION RATE: 18 BRPM | BODY MASS INDEX: 42.66 KG/M2 | OXYGEN SATURATION: 94 %

## 2025-08-05 PROCEDURE — G0422 INTENS CARDIAC REHAB W/EXERC: HCPCS

## 2025-08-05 ASSESSMENT — NEW YORK HEART ASSOCIATION (NYHA) CLASSIFICATION: NYHA FUNCTIONAL CLASS: NO NYHA CLASS OR UNABLE TO DETERMINE

## 2025-08-05 ASSESSMENT — PATIENT HEALTH QUESTIONNAIRE - PHQ9
7. TROUBLE CONCENTRATING ON THINGS, SUCH AS READING THE NEWSPAPER OR WATCHING TELEVISION: NOT AT ALL
9. THOUGHTS THAT YOU WOULD BE BETTER OFF DEAD, OR OF HURTING YOURSELF: NOT AT ALL
5. POOR APPETITE OR OVEREATING: NOT AT ALL
SUM OF ALL RESPONSES TO PHQ QUESTIONS 1-9: 1
1. LITTLE INTEREST OR PLEASURE IN DOING THINGS: NOT AT ALL
6. FEELING BAD ABOUT YOURSELF - OR THAT YOU ARE A FAILURE OR HAVE LET YOURSELF OR YOUR FAMILY DOWN: NOT AT ALL
SUM OF ALL RESPONSES TO PHQ QUESTIONS 1-9: 1
3. TROUBLE FALLING OR STAYING ASLEEP: NOT AT ALL
SUM OF ALL RESPONSES TO PHQ QUESTIONS 1-9: 1
4. FEELING TIRED OR HAVING LITTLE ENERGY: SEVERAL DAYS
2. FEELING DOWN, DEPRESSED OR HOPELESS: NOT AT ALL
8. MOVING OR SPEAKING SO SLOWLY THAT OTHER PEOPLE COULD HAVE NOTICED. OR THE OPPOSITE, BEING SO FIGETY OR RESTLESS THAT YOU HAVE BEEN MOVING AROUND A LOT MORE THAN USUAL: NOT AT ALL
10. IF YOU CHECKED OFF ANY PROBLEMS, HOW DIFFICULT HAVE THESE PROBLEMS MADE IT FOR YOU TO DO YOUR WORK, TAKE CARE OF THINGS AT HOME, OR GET ALONG WITH OTHER PEOPLE: NOT DIFFICULT AT ALL
SUM OF ALL RESPONSES TO PHQ QUESTIONS 1-9: 1

## 2025-08-08 ENCOUNTER — APPOINTMENT (OUTPATIENT)
Dept: CARDIAC REHAB | Age: 75
End: 2025-08-08
Payer: MEDICARE

## 2025-08-08 ENCOUNTER — HOSPITAL ENCOUNTER (OUTPATIENT)
Dept: CARDIAC REHAB | Age: 75
Setting detail: THERAPIES SERIES
Discharge: HOME | End: 2025-08-08
Payer: MEDICARE

## 2025-08-08 PROCEDURE — G0422 INTENS CARDIAC REHAB W/EXERC: HCPCS

## 2025-08-11 ENCOUNTER — APPOINTMENT (OUTPATIENT)
Dept: CARDIAC REHAB | Age: 75
End: 2025-08-11
Payer: MEDICARE

## 2025-08-13 ENCOUNTER — APPOINTMENT (OUTPATIENT)
Dept: CARDIAC REHAB | Age: 75
End: 2025-08-13
Payer: MEDICARE

## 2025-08-14 ENCOUNTER — APPOINTMENT (OUTPATIENT)
Dept: CARDIAC REHAB | Age: 75
End: 2025-08-14
Payer: MEDICARE

## 2025-08-15 ENCOUNTER — APPOINTMENT (OUTPATIENT)
Dept: CARDIAC REHAB | Age: 75
End: 2025-08-15
Payer: MEDICARE

## 2025-08-18 ENCOUNTER — APPOINTMENT (OUTPATIENT)
Dept: CARDIAC REHAB | Age: 75
End: 2025-08-18
Payer: MEDICARE

## 2025-08-19 ENCOUNTER — HOSPITAL ENCOUNTER (OUTPATIENT)
Dept: CARDIAC REHAB | Age: 75
Setting detail: THERAPIES SERIES
Discharge: HOME | End: 2025-08-19
Payer: MEDICARE

## 2025-08-19 PROCEDURE — G0422 INTENS CARDIAC REHAB W/EXERC: HCPCS

## 2025-08-22 ENCOUNTER — HOSPITAL ENCOUNTER (OUTPATIENT)
Dept: CARDIAC REHAB | Age: 75
Setting detail: THERAPIES SERIES
Discharge: HOME | End: 2025-08-22
Payer: MEDICARE

## 2025-08-22 PROCEDURE — G0422 INTENS CARDIAC REHAB W/EXERC: HCPCS

## 2025-08-25 ENCOUNTER — HOSPITAL ENCOUNTER (OUTPATIENT)
Dept: CARDIAC REHAB | Age: 75
Setting detail: THERAPIES SERIES
Discharge: HOME | End: 2025-08-25
Payer: MEDICARE

## 2025-08-25 ASSESSMENT — NEW YORK HEART ASSOCIATION (NYHA) CLASSIFICATION: NYHA FUNCTIONAL CLASS: NO NYHA CLASS OR UNABLE TO DETERMINE

## 2025-08-26 ENCOUNTER — HOSPITAL ENCOUNTER (OUTPATIENT)
Dept: CARDIAC REHAB | Age: 75
Setting detail: THERAPIES SERIES
Discharge: HOME | End: 2025-08-26
Payer: MEDICARE

## 2025-08-26 PROCEDURE — G0422 INTENS CARDIAC REHAB W/EXERC: HCPCS

## 2025-08-27 ENCOUNTER — APPOINTMENT (OUTPATIENT)
Dept: CARDIAC REHAB | Age: 75
End: 2025-08-27
Payer: MEDICARE

## 2025-08-29 ENCOUNTER — APPOINTMENT (OUTPATIENT)
Dept: CARDIAC REHAB | Age: 75
End: 2025-08-29
Payer: MEDICARE

## (undated) PROCEDURE — 4A023N7 MEASUREMENT OF CARDIAC SAMPLING AND PRESSURE, LEFT HEART, PERCUTANEOUS APPROACH: ICD-10-PCS

## (undated) DEVICE — CATH BLLN ANGIO 4X15MM NC EUPHORIA RX

## (undated) DEVICE — INFLATION DVCBLUE DMND30 ATM / BR20 MLMP802PHD  ME

## (undated) DEVICE — TUBING, SUCTION, 1/4" X 6', SCALLOP: Brand: MEDLINE

## (undated) DEVICE — Device

## (undated) DEVICE — DRAPE SURG BRACH STRL

## (undated) DEVICE — ELECTRODE TRAIN EDGE SYS W/ QUIK-COMBO CONN

## (undated) DEVICE — CATHETER DIAG 5FR L100CM LUMN ID0.047IN JL3.5 CRV 0 SIDE H

## (undated) DEVICE — PML 12 ADULT FLL-MLL PG: Brand: NAMIC

## (undated) DEVICE — CATHETER COR DIAG PIGTAILS PIG 145 CRV 5FR 110CM 6 SIDE H

## (undated) DEVICE — SHEET,DRAPE,53X77,STERILE: Brand: MEDLINE

## (undated) DEVICE — KIT ANGIO W/ AT P65 PREM HND CTRL FOR CNTRST DEL ANGIOTOUCH

## (undated) DEVICE — GLIDESHEATH SLENDER STAINLESS STEEL KIT: Brand: GLIDESHEATH SLENDER

## (undated) DEVICE — CATHETER GUID 6FR L100CM DIA0.071IN NYL SHFT EBU3.5

## (undated) DEVICE — 3M™ TEGADERM™ TRANSPARENT FILM DRESSING FRAME STYLE, 1626W, 4 IN X 4-3/4 IN (10 CM X 12 CM), 50/CT 4CT/CASE: Brand: 3M™ TEGADERM™

## (undated) DEVICE — GLOVE ORANGE PI 7 1/2   MSG9075

## (undated) DEVICE — CONTAINER,SPECIMEN,OR STERILE,4OZ: Brand: MEDLINE

## (undated) DEVICE — GLOVE SURG SZ 6 THK91MIL LTX FREE SYN POLYISOPRENE ANTI

## (undated) DEVICE — GUIDEWIRE VASC L260CM DIA0.035IN TIP L3MM STD EXCHG PTFE J

## (undated) DEVICE — KIT MFLD ISOLATN NACL CNTRST PRT TBNG SPIK W/ PRSS TRNSDUC

## (undated) DEVICE — STOPCOCK FLUID MGMT 3 W 1050 PSI IV ROTATABLE HI PRESSURE

## (undated) DEVICE — CATHETER COR DIAG AMPLATZ R 2.0 CRV 5FR 100CM 0 SIDE H

## (undated) DEVICE — HI-TORQUE BALANCE MIDDLEWEIGHT UNIVERSAL GUIDE WIRE .014 J TIP 3.0 CM X 190 CM: Brand: HI-TORQUE BALANCE MIDDLEWEIGHT UNIVERSAL

## (undated) DEVICE — DEVICE COMPR LNG 27 CM VASC BND